# Patient Record
Sex: MALE | Race: WHITE | NOT HISPANIC OR LATINO | URBAN - METROPOLITAN AREA
[De-identification: names, ages, dates, MRNs, and addresses within clinical notes are randomized per-mention and may not be internally consistent; named-entity substitution may affect disease eponyms.]

---

## 2018-01-31 ENCOUNTER — OUTPATIENT (OUTPATIENT)
Dept: OUTPATIENT SERVICES | Facility: HOSPITAL | Age: 48
LOS: 1 days | Discharge: HOME | End: 2018-01-31

## 2018-02-04 DIAGNOSIS — D70.9 NEUTROPENIA, UNSPECIFIED: ICD-10-CM

## 2018-02-04 DIAGNOSIS — F10.20 ALCOHOL DEPENDENCE, UNCOMPLICATED: ICD-10-CM

## 2018-02-04 DIAGNOSIS — F14.10 COCAINE ABUSE, UNCOMPLICATED: ICD-10-CM

## 2018-02-04 DIAGNOSIS — B96.89 OTHER SPECIFIED BACTERIAL AGENTS AS THE CAUSE OF DISEASES CLASSIFIED ELSEWHERE: ICD-10-CM

## 2018-02-12 DIAGNOSIS — K08.409 PARTIAL LOSS OF TEETH, UNSPECIFIED CAUSE, UNSPECIFIED CLASS: ICD-10-CM

## 2018-05-05 ENCOUNTER — INPATIENT (INPATIENT)
Facility: HOSPITAL | Age: 48
LOS: 1 days | Discharge: HOME | End: 2018-05-07
Attending: INTERNAL MEDICINE | Admitting: INTERNAL MEDICINE

## 2018-05-05 VITALS
DIASTOLIC BLOOD PRESSURE: 75 MMHG | HEIGHT: 75 IN | OXYGEN SATURATION: 95 % | RESPIRATION RATE: 20 BRPM | WEIGHT: 220.02 LBS | TEMPERATURE: 98 F | SYSTOLIC BLOOD PRESSURE: 129 MMHG | HEART RATE: 86 BPM

## 2018-05-05 DIAGNOSIS — F14.20 COCAINE DEPENDENCE, UNCOMPLICATED: ICD-10-CM

## 2018-05-05 DIAGNOSIS — R00.0 TACHYCARDIA, UNSPECIFIED: ICD-10-CM

## 2018-05-05 DIAGNOSIS — F19.20 OTHER PSYCHOACTIVE SUBSTANCE DEPENDENCE, UNCOMPLICATED: ICD-10-CM

## 2018-05-05 DIAGNOSIS — F10.20 ALCOHOL DEPENDENCE, UNCOMPLICATED: ICD-10-CM
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
## 2018-05-05 LAB
ALBUMIN SERPL ELPH-MCNC: 4.2 G/DL — SIGNIFICANT CHANGE UP (ref 3.5–5.2)
ALP SERPL-CCNC: 66 U/L — SIGNIFICANT CHANGE UP (ref 30–115)
ALT FLD-CCNC: 64 U/L — HIGH (ref 0–41)
AMMONIA BLD-MCNC: 26 UMOL/L — SIGNIFICANT CHANGE UP (ref 11–55)
AMYLASE P1 CFR SERPL: 13 U/L — LOW (ref 25–115)
ANION GAP SERPL CALC-SCNC: 11 MMOL/L — SIGNIFICANT CHANGE UP (ref 7–14)
APAP SERPL-MCNC: <5 UG/ML — LOW (ref 10–30)
APPEARANCE UR: CLEAR — SIGNIFICANT CHANGE UP
AST SERPL-CCNC: 73 U/L — HIGH (ref 0–41)
BASOPHILS # BLD AUTO: 0.03 K/UL — SIGNIFICANT CHANGE UP (ref 0–0.2)
BASOPHILS NFR BLD AUTO: 0.5 % — SIGNIFICANT CHANGE UP (ref 0–1)
BILIRUB SERPL-MCNC: 0.6 MG/DL — SIGNIFICANT CHANGE UP (ref 0.2–1.2)
BILIRUB UR-MCNC: (no result)
BUN SERPL-MCNC: 25 MG/DL — HIGH (ref 10–20)
CALCIUM SERPL-MCNC: 9.7 MG/DL — SIGNIFICANT CHANGE UP (ref 8.5–10.1)
CHLORIDE SERPL-SCNC: 101 MMOL/L — SIGNIFICANT CHANGE UP (ref 98–110)
CK MB CFR SERPL CALC: 25.1 NG/ML — HIGH (ref 0.6–6.3)
CK SERPL-CCNC: 1464 U/L — HIGH (ref 0–225)
CO2 SERPL-SCNC: 27 MMOL/L — SIGNIFICANT CHANGE UP (ref 17–32)
COLOR SPEC: YELLOW — SIGNIFICANT CHANGE UP
CREAT SERPL-MCNC: 1.1 MG/DL — SIGNIFICANT CHANGE UP (ref 0.7–1.5)
DIFF PNL FLD: (no result)
EOSINOPHIL # BLD AUTO: 0.01 K/UL — SIGNIFICANT CHANGE UP (ref 0–0.7)
EOSINOPHIL NFR BLD AUTO: 0.2 % — SIGNIFICANT CHANGE UP (ref 0–8)
ETHANOL SERPL-MCNC: <10 MG/DL — HIGH
GLUCOSE SERPL-MCNC: 116 MG/DL — HIGH (ref 70–99)
GLUCOSE UR QL: NEGATIVE MG/DL — SIGNIFICANT CHANGE UP
HCT VFR BLD CALC: 44.8 % — SIGNIFICANT CHANGE UP (ref 42–52)
HGB BLD-MCNC: 15.2 G/DL — SIGNIFICANT CHANGE UP (ref 14–18)
IMM GRANULOCYTES NFR BLD AUTO: 0.2 % — SIGNIFICANT CHANGE UP (ref 0.1–0.3)
KETONES UR-MCNC: NEGATIVE — SIGNIFICANT CHANGE UP
LEUKOCYTE ESTERASE UR-ACNC: NEGATIVE — SIGNIFICANT CHANGE UP
LYMPHOCYTES # BLD AUTO: 1.47 K/UL — SIGNIFICANT CHANGE UP (ref 1.2–3.4)
LYMPHOCYTES # BLD AUTO: 24.1 % — SIGNIFICANT CHANGE UP (ref 20.5–51.1)
MAGNESIUM SERPL-MCNC: 2.1 MG/DL — SIGNIFICANT CHANGE UP (ref 1.8–2.4)
MCHC RBC-ENTMCNC: 29 PG — SIGNIFICANT CHANGE UP (ref 27–31)
MCHC RBC-ENTMCNC: 33.9 G/DL — SIGNIFICANT CHANGE UP (ref 32–37)
MCV RBC AUTO: 85.3 FL — SIGNIFICANT CHANGE UP (ref 80–94)
MONOCYTES # BLD AUTO: 0.92 K/UL — HIGH (ref 0.1–0.6)
MONOCYTES NFR BLD AUTO: 15.1 % — HIGH (ref 1.7–9.3)
NEUTROPHILS # BLD AUTO: 3.67 K/UL — SIGNIFICANT CHANGE UP (ref 1.4–6.5)
NEUTROPHILS NFR BLD AUTO: 59.9 % — SIGNIFICANT CHANGE UP (ref 42.2–75.2)
NITRITE UR-MCNC: NEGATIVE — SIGNIFICANT CHANGE UP
PH UR: 5.5 — SIGNIFICANT CHANGE UP (ref 5–8)
PLATELET # BLD AUTO: 207 K/UL — SIGNIFICANT CHANGE UP (ref 130–400)
POTASSIUM SERPL-MCNC: 4.1 MMOL/L — SIGNIFICANT CHANGE UP (ref 3.5–5)
POTASSIUM SERPL-SCNC: 4.1 MMOL/L — SIGNIFICANT CHANGE UP (ref 3.5–5)
PROT SERPL-MCNC: 7.5 G/DL — SIGNIFICANT CHANGE UP (ref 6–8)
PROT UR-MCNC: 100 MG/DL
RBC # BLD: 5.25 M/UL — SIGNIFICANT CHANGE UP (ref 4.7–6.1)
RBC # FLD: 13 % — SIGNIFICANT CHANGE UP (ref 11.5–14.5)
SALICYLATES SERPL-MCNC: <0.3 MG/DL — LOW (ref 4–30)
SODIUM SERPL-SCNC: 139 MMOL/L — SIGNIFICANT CHANGE UP (ref 135–146)
SP GR SPEC: >=1.03 (ref 1.01–1.03)
TROPONIN T SERPL-MCNC: <0.01 NG/ML — SIGNIFICANT CHANGE UP
UROBILINOGEN FLD QL: 0.2 MG/DL — SIGNIFICANT CHANGE UP (ref 0.2–0.2)
WBC # BLD: 6.11 K/UL — SIGNIFICANT CHANGE UP (ref 4.8–10.8)
WBC # FLD AUTO: 6.11 K/UL — SIGNIFICANT CHANGE UP (ref 4.8–10.8)

## 2018-05-05 RX ORDER — THIAMINE MONONITRATE (VIT B1) 100 MG
100 TABLET ORAL DAILY
Qty: 0 | Refills: 0 | Status: DISCONTINUED | OUTPATIENT
Start: 2018-05-05 | End: 2018-05-07

## 2018-05-05 RX ORDER — TUBERCULIN PURIFIED PROTEIN DERIVATIVE 5 [IU]/.1ML
5 INJECTION, SOLUTION INTRADERMAL ONCE
Qty: 0 | Refills: 0 | Status: COMPLETED | OUTPATIENT
Start: 2018-05-05 | End: 2018-05-06

## 2018-05-05 RX ORDER — MAGNESIUM HYDROXIDE 400 MG/1
30 TABLET, CHEWABLE ORAL DAILY
Qty: 0 | Refills: 0 | Status: DISCONTINUED | OUTPATIENT
Start: 2018-05-05 | End: 2018-05-07

## 2018-05-05 RX ORDER — HYDROXYZINE HCL 10 MG
50 TABLET ORAL EVERY 6 HOURS
Qty: 0 | Refills: 0 | Status: DISCONTINUED | OUTPATIENT
Start: 2018-05-05 | End: 2018-05-07

## 2018-05-05 RX ORDER — HYDROXYZINE HCL 10 MG
100 TABLET ORAL AT BEDTIME
Qty: 0 | Refills: 0 | Status: DISCONTINUED | OUTPATIENT
Start: 2018-05-05 | End: 2018-05-07

## 2018-05-05 RX ORDER — ALPRAZOLAM 0.25 MG
1 TABLET ORAL ONCE
Qty: 0 | Refills: 0 | Status: COMPLETED | OUTPATIENT
Start: 2018-05-05 | End: 2018-05-05

## 2018-05-05 RX ORDER — FOLIC ACID 0.8 MG
1 TABLET ORAL DAILY
Qty: 0 | Refills: 0 | Status: DISCONTINUED | OUTPATIENT
Start: 2018-05-05 | End: 2018-05-07

## 2018-05-05 NOTE — ED PROVIDER NOTE - NS ED ROS FT
Eyes:  No visual changes, eye pain or discharge.  ENMT:  No hearing changes, pain, no sore throat or runny nose, no difficulty swallowing  Cardiac:  No chest pain, SOB or edema. No chest pain with exertion.  Respiratory:  No cough or respiratory distress. No hemoptysis. No history of asthma or RAD.  GI:  No nausea, vomiting, diarrhea or abdominal pain.  :  No dysuria, frequency or burning.  MS:  No myalgia, muscle weakness, joint pain or back pain.  Neuro:  No headache or weakness.  No LOC.  Skin:  No skin rash.   Endocrine: No history of thyroid disease or diabetes.

## 2018-05-05 NOTE — ED PROVIDER NOTE - OBJECTIVE STATEMENT
49 yo M here for detox.  Pt recenlty released from alf and states hes gone on a 3 day zacarias of drinking large amounts of vodka and doing cocaine everyday.  Pt anxious in the ER,  tachycardic.  no n/v.  No chest pain

## 2018-05-05 NOTE — H&P ADULT - HISTORY OF PRESENT ILLNESS
· HPI Objective Statement: 47 yo M here for detox.  Pt recenlty released from longterm and states hes gone on a 3 day zacarias of drinking one liter of vodka and doing$100  cocaine everyday.  Pt anxious in the ER,  tachycardic.  no n/v.  No chest pain no h/o ivda, no h/o cindi, tremors, seizures dts in withdrawals	    HIV:    HIV Status:  · Offered: Declined	    PAST MEDICAL/SURGICAL/FAMILY/SOCIAL HISTORY:    Past Medical History:  Alcoholism.     Tobacco Usage:  · Tobacco Usage	Unknown if ever smoked	    ALLERGIES AND HOME MEDICATIONS:   Allergies:        Allergies:  	No Known Allergies:     Home Medications:   * Outpatient Medication Status not yet specified    PHYSICAL EXAM:   · Physical Examination: CONSTITUTIONAL: Well-developed; well-nourished; in no acute distress.   SKIN: warm, dry  HEAD: Normocephalic; atraumatic.  EYES: PERRL, EOMI, no conjunctival erythema  ENT: No nasal discharge; airway clear.  NECK: Supple; non tender.  CARD: S1, S2 normal; no murmurs, gallops, or rubs. Regular rate and rhythm.   RESP: No wheezes, rales or rhonchi.  ABD: soft ntnd  EXT: Normal ROM.  No clubbing, cyanosis or edema.   LYMPH: No acute cervical adenopathy.  NEURO: Alert, oriented, grossly unremarkable PSYCH: Cooperative, appropriate.

## 2018-05-05 NOTE — H&P ADULT - NSHPREVIEWOFSYSTEMS_GEN_ALL_CORE
REVIEW OF SYSTEMS:    Review of Systems:  · Review of Systems: Eyes:  No visual changes, eye pain or discharge.  	ENMT:  No hearing changes, pain, no sore throat or runny nose, no difficulty swallowing  	Cardiac:  No chest pain, SOB or edema. No chest pain with exertion.  	Respiratory:  No cough or respiratory distress. No hemoptysis. No history of asthma or RAD.  	GI:  No nausea, vomiting, diarrhea or abdominal pain.  	:  No dysuria, frequency or burning.  	MS:  No myalgia, muscle weakness, joint pain or back pain.  	Neuro:  No headache or weakness.  No LOC.  	Skin:  No skin rash.   Endocrine: No history of thyroid disease or diabetes.

## 2018-05-05 NOTE — H&P ADULT - NSHPLABSRESULTS_GEN_ALL_CORE
15.2   6.11  )-----------( 207      ( 05 May 2018 19:05 )             44.8   05-05    139  |  101  |  25<H>  ----------------------------<  116<H>  4.1   |  27  |  1.1    Ca    9.7      05 May 2018 19:05  Mg     2.1     05-05    TPro  7.5  /  Alb  4.2  /  TBili  0.6  /  DBili  x   /  AST  73<H>  /  ALT  64<H>  /  AlkPhos  66  05-05

## 2018-05-06 LAB
HAV IGG SER QL IA: SIGNIFICANT CHANGE UP
HAV IGM SER-ACNC: SIGNIFICANT CHANGE UP
HBV CORE AB SER-ACNC: SIGNIFICANT CHANGE UP
HBV CORE IGM SER-ACNC: SIGNIFICANT CHANGE UP
HBV SURFACE AB SER-ACNC: SIGNIFICANT CHANGE UP
HBV SURFACE AG SER-ACNC: SIGNIFICANT CHANGE UP
HCV AB S/CO SERPL IA: 0.2 S/CO — SIGNIFICANT CHANGE UP
HCV AB SERPL-IMP: SIGNIFICANT CHANGE UP
T PALLIDUM AB TITR SER: NEGATIVE — SIGNIFICANT CHANGE UP

## 2018-05-06 RX ADMIN — Medication 1 TABLET(S): at 09:13

## 2018-05-06 RX ADMIN — Medication 50 MILLIGRAM(S): at 09:15

## 2018-05-06 RX ADMIN — TUBERCULIN PURIFIED PROTEIN DERIVATIVE 5 UNIT(S): 5 INJECTION, SOLUTION INTRADERMAL at 22:26

## 2018-05-06 RX ADMIN — Medication 1 MILLIGRAM(S): at 09:13

## 2018-05-06 RX ADMIN — Medication 50 MILLIGRAM(S): at 09:12

## 2018-05-06 RX ADMIN — Medication 100 MILLIGRAM(S): at 09:12

## 2018-05-07 VITALS
TEMPERATURE: 98 F | SYSTOLIC BLOOD PRESSURE: 101 MMHG | HEART RATE: 71 BPM | DIASTOLIC BLOOD PRESSURE: 56 MMHG | RESPIRATION RATE: 16 BRPM

## 2018-05-07 LAB — DRUG SCREEN 1, URINE RESULT: SIGNIFICANT CHANGE UP

## 2018-05-07 RX ADMIN — Medication 1 MILLIGRAM(S): at 09:19

## 2018-05-07 RX ADMIN — Medication 1 TABLET(S): at 09:19

## 2018-05-07 RX ADMIN — Medication 100 MILLIGRAM(S): at 09:19

## 2018-05-07 NOTE — CHART NOTE - NSCHARTNOTEFT_GEN_A_CORE
Subsequent Inpatient Encounter                                       Detox Unit    ADRIANNA GARZA   48y   Male      Chief Complaint:    Follow up for Alcohol  Dependency    HPI:     I reviewed previous notes. No Change, except if noted below.             Detail:_    ROS:   I reviewed with patient.  No changes from previous notes except if noted below.             Detail: _    PFSH I reviewed with patient. No changes from previous notes except if noted below.             Detail_    Medication reconciliation performed.    MEDICATIONS  (STANDING):  folic acid 1 milliGRAM(s) Oral daily  multivitamin 1 Tablet(s) Oral daily  thiamine 100 milliGRAM(s) Oral daily      MEDICATIONS  (PRN):  aluminum hydroxide/magnesium hydroxide/simethicone Suspension 30 milliLiter(s) Oral every 4 hours PRN Dyspepsia  chlordiazePOXIDE 50 milliGRAM(s) Oral every 1 hour PRN Alcohol Withdrawal Symptoms  chlordiazePOXIDE 25 milliGRAM(s) Oral every 2 hours PRN Alcohol Withdrawal Symptoms  cloNIDine 0.1 milliGRAM(s) Oral every 8 hours PRN sbp>140  hydrOXYzine hydrochloride 50 milliGRAM(s) Oral every 6 hours PRN Anxiety  hydrOXYzine hydrochloride 100 milliGRAM(s) Oral at bedtime PRN insomnia  magnesium hydroxide Suspension 30 milliLiter(s) Oral daily PRN Constipation      T(C): 36.4 (05-07-18 @ 06:00), Max: 37.1 (05-06-18 @ 20:04)  HR: 71 (05-07-18 @ 06:00) (69 - 112)  BP: 101/56 (05-07-18 @ 06:00) (101/56 - 120/62)  RR: 16 (05-07-18 @ 06:00) (16 - 16)  SpO2: --    PHYSICAL EXAM:      Constitutional: NAD, A&O x3    Eyes: PERRLA, no conjuctivitis    Neck: no lymphadenopathy    Respiratory: +air entry, no rales, no rhonchi, no wheezes    Cardiovascular: +S1 and S2, regular rate and rhythm    Gastrointestinal: +BS, soft, non-tender, not distended    Extremities:  no edema, no calf tenderness    Skin: no rashes, normal turgor                            15.2   6.11  )-----------( 207      ( 05 May 2018 19:05 )             44.8   05-05    139  |  101  |  25<H>  ----------------------------<  116<H>  4.1   |  27  |  1.1    Ca    9.7      05 May 2018 19:05  Mg     2.1     05-05    TPro  7.5  /  Alb  4.2  /  TBili  0.6  /  DBili  x   /  AST  73<H>  /  ALT  64<H>  /  AlkPhos  66  05-05    Magnesium, Serum: 2.1 mg/dL (05-05-18 @ 19:05)  Ammonia, Serum: 26 umol/L (05-05-18 @ 19:05)  Amylase, Serum Total: 13 U/L (05-05-18 @ 19:05)  Treponema Pallidum Antibody Interpretation: Negative (05-05-18 @ 19:05)  Hepatitis B Core Antibody, Total: Nonreact (05-05-18 @ 19:05)  Hepatitis B Surface Antibody: Nonreact (05-05-18 @ 19:05)  Hepatitis B Surface Antigen: Nonreact (05-05-18 @ 19:05)  Hepatitis C Virus S/CO Ratio: 0.20 S/CO (05-05-18 @ 19:05)    Hepatitis C Virus Interpretation: Nonreact (05-05-18 @ 19:05)      Urinalysis Basic - ( 05 May 2018 21:32 )    Color: Yellow / Appearance: Clear / SG: >=1.030 / pH: x  Gluc: x / Ketone: Negative  / Bili: Small / Urobili: 0.2 mg/dL   Blood: x / Protein: 100 mg/dL / Nitrite: Negative   Leuk Esterase: Negative / RBC: 1-2 /HPF / WBC 1-2 /HPF   Sq Epi: x / Non Sq Epi: Occasional /HPF / Bacteria: x          Impression and Plan:    Primary Diagnosis:  Alcohol Dependency                                Medication: Librium Protocol/ CIWA Protocol    Secondary Diagnosis:                                                                  Medication:    Tertiary Diagnosis:                                                                       Medication      Continue Detox Protocols. Use of PRNS as needed for withdrawal and comfort.    Adjustments to protocols:    Labs/ Tests reviewed.    Tests ordered:     Likely Disposition: __X_Home       ___Rehab       ___Outpatient Program    ___Self Help     _____Other    Estimated Length of stay:__3__

## 2018-05-15 ENCOUNTER — EMERGENCY (EMERGENCY)
Facility: HOSPITAL | Age: 48
LOS: 0 days | Discharge: HOME | End: 2018-05-15
Attending: EMERGENCY MEDICINE | Admitting: EMERGENCY MEDICINE

## 2018-05-15 VITALS
DIASTOLIC BLOOD PRESSURE: 61 MMHG | RESPIRATION RATE: 18 BRPM | SYSTOLIC BLOOD PRESSURE: 110 MMHG | TEMPERATURE: 99 F | HEART RATE: 78 BPM | OXYGEN SATURATION: 97 %

## 2018-05-15 DIAGNOSIS — J02.9 ACUTE PHARYNGITIS, UNSPECIFIED: ICD-10-CM

## 2018-05-15 DIAGNOSIS — R59.0 LOCALIZED ENLARGED LYMPH NODES: ICD-10-CM

## 2018-05-15 DIAGNOSIS — Z23 ENCOUNTER FOR IMMUNIZATION: ICD-10-CM

## 2018-05-15 DIAGNOSIS — Z87.891 PERSONAL HISTORY OF NICOTINE DEPENDENCE: ICD-10-CM

## 2018-05-15 DIAGNOSIS — J34.89 OTHER SPECIFIED DISORDERS OF NOSE AND NASAL SINUSES: ICD-10-CM

## 2018-05-15 DIAGNOSIS — R50.9 FEVER, UNSPECIFIED: ICD-10-CM

## 2018-05-15 DIAGNOSIS — Z79.899 OTHER LONG TERM (CURRENT) DRUG THERAPY: ICD-10-CM

## 2018-05-15 DIAGNOSIS — L03.114 CELLULITIS OF LEFT UPPER LIMB: ICD-10-CM

## 2018-05-15 LAB
ALBUMIN SERPL ELPH-MCNC: 3.2 G/DL — LOW (ref 3.5–5.2)
ALP SERPL-CCNC: 73 U/L — SIGNIFICANT CHANGE UP (ref 30–115)
ALT FLD-CCNC: 32 U/L — SIGNIFICANT CHANGE UP (ref 0–41)
ANION GAP SERPL CALC-SCNC: 13 MMOL/L — SIGNIFICANT CHANGE UP (ref 7–14)
AST SERPL-CCNC: 19 U/L — SIGNIFICANT CHANGE UP (ref 0–41)
BASOPHILS # BLD AUTO: 0.03 K/UL — SIGNIFICANT CHANGE UP (ref 0–0.2)
BASOPHILS NFR BLD AUTO: 0.7 % — SIGNIFICANT CHANGE UP (ref 0–1)
BILIRUB SERPL-MCNC: 0.4 MG/DL — SIGNIFICANT CHANGE UP (ref 0.2–1.2)
BUN SERPL-MCNC: 18 MG/DL — SIGNIFICANT CHANGE UP (ref 10–20)
CALCIUM SERPL-MCNC: 8.6 MG/DL — SIGNIFICANT CHANGE UP (ref 8.5–10.1)
CHLORIDE SERPL-SCNC: 103 MMOL/L — SIGNIFICANT CHANGE UP (ref 98–110)
CO2 SERPL-SCNC: 24 MMOL/L — SIGNIFICANT CHANGE UP (ref 17–32)
CREAT SERPL-MCNC: 0.9 MG/DL — SIGNIFICANT CHANGE UP (ref 0.7–1.5)
EOSINOPHIL # BLD AUTO: 0.12 K/UL — SIGNIFICANT CHANGE UP (ref 0–0.7)
EOSINOPHIL NFR BLD AUTO: 2.9 % — SIGNIFICANT CHANGE UP (ref 0–8)
GLUCOSE SERPL-MCNC: 149 MG/DL — HIGH (ref 70–99)
HCT VFR BLD CALC: 36.4 % — LOW (ref 42–52)
HGB BLD-MCNC: 12.1 G/DL — LOW (ref 14–18)
IMM GRANULOCYTES NFR BLD AUTO: 9.6 % — HIGH (ref 0.1–0.3)
LYMPHOCYTES # BLD AUTO: 0.85 K/UL — LOW (ref 1.2–3.4)
LYMPHOCYTES # BLD AUTO: 20.5 % — SIGNIFICANT CHANGE UP (ref 20.5–51.1)
MCHC RBC-ENTMCNC: 27.6 PG — SIGNIFICANT CHANGE UP (ref 27–31)
MCHC RBC-ENTMCNC: 33.2 G/DL — SIGNIFICANT CHANGE UP (ref 32–37)
MCV RBC AUTO: 83.1 FL — SIGNIFICANT CHANGE UP (ref 80–94)
MONOCYTES # BLD AUTO: 1.25 K/UL — HIGH (ref 0.1–0.6)
MONOCYTES NFR BLD AUTO: 30.1 % — HIGH (ref 1.7–9.3)
NEUTROPHILS # BLD AUTO: 1.5 K/UL — SIGNIFICANT CHANGE UP (ref 1.4–6.5)
NEUTROPHILS NFR BLD AUTO: 36.2 % — LOW (ref 42.2–75.2)
NRBC # BLD: 0 /100 WBCS — SIGNIFICANT CHANGE UP (ref 0–0)
PLATELET # BLD AUTO: 248 K/UL — SIGNIFICANT CHANGE UP (ref 130–400)
POTASSIUM SERPL-MCNC: 3.5 MMOL/L — SIGNIFICANT CHANGE UP (ref 3.5–5)
POTASSIUM SERPL-SCNC: 3.5 MMOL/L — SIGNIFICANT CHANGE UP (ref 3.5–5)
PROT SERPL-MCNC: 5.8 G/DL — LOW (ref 6–8)
RBC # BLD: 4.38 M/UL — LOW (ref 4.7–6.1)
RBC # FLD: 12.9 % — SIGNIFICANT CHANGE UP (ref 11.5–14.5)
SODIUM SERPL-SCNC: 140 MMOL/L — SIGNIFICANT CHANGE UP (ref 135–146)
WBC # BLD: 4.15 K/UL — LOW (ref 4.8–10.8)
WBC # FLD AUTO: 4.15 K/UL — LOW (ref 4.8–10.8)

## 2018-05-15 RX ORDER — DEXAMETHASONE 0.5 MG/5ML
12 ELIXIR ORAL ONCE
Qty: 0 | Refills: 0 | Status: DISCONTINUED | OUTPATIENT
Start: 2018-05-15 | End: 2018-05-15

## 2018-05-15 RX ORDER — TETANUS TOXOID, REDUCED DIPHTHERIA TOXOID AND ACELLULAR PERTUSSIS VACCINE, ADSORBED 5; 2.5; 8; 8; 2.5 [IU]/.5ML; [IU]/.5ML; UG/.5ML; UG/.5ML; UG/.5ML
0.5 SUSPENSION INTRAMUSCULAR ONCE
Qty: 0 | Refills: 0 | Status: COMPLETED | OUTPATIENT
Start: 2018-05-15 | End: 2018-05-15

## 2018-05-15 RX ADMIN — TETANUS TOXOID, REDUCED DIPHTHERIA TOXOID AND ACELLULAR PERTUSSIS VACCINE, ADSORBED 0.5 MILLILITER(S): 5; 2.5; 8; 8; 2.5 SUSPENSION INTRAMUSCULAR at 06:39

## 2018-05-15 NOTE — ED PROVIDER NOTE - PHYSICAL EXAMINATION
VITAL SIGNS: I have reviewed nursing notes and confirm.  CONSTITUTIONAL: Well-developed; well-nourished; in no acute distress.  SKIN: Skin exam is warm and dry, old appearing lac to L hand over snuffbox, mild erythema surround, no warmth, no fluctuance.  HEAD: Normocephalic; atraumatic.  EYES: PERRL, EOM intact; conjunctiva and sclera clear.  ENT: clear rhinorrhea, midline uvula, mildly eyrthematous posterior oropharynx, no exudate, no edema, airway clear.  NECK: Supple; non tender. see lymph  CARD:. Regular rate and rhythm.  RESP: No wheezes, rales or rhonchi.  ABD: Normal bowel sounds; soft; non-distended; non-tender; no hepatosplenomegaly.  EXT: Normal ROM. No clubbing, cyanosis or edema.  LYMPH: anterior lymphadenopathy, R >L, no matting, mildly tender  NEURO: Alert, oriented. Grossly unremarkable. No focal deficits.  PSYCH: Cooperative, appropriate.

## 2018-05-15 NOTE — ED PROVIDER NOTE - NS ED ROS FT
Constitutional: see HPI  ENT: see HPI  Cardiac: No chest pain, SOB or edema.  Respiratory: No cough or respiratory distress  GI: No nausea, vomiting, diarrhea or abdominal pain.  : No dysuria, frequency, urgency or hematuria  MS: no pain to back or extremities, no loss of ROM, no weakness  Neuro: No headache or weakness. No LOC.  Skin: see HPI  Except as documented in the HPI, all other systems are negative.

## 2018-05-15 NOTE — ED PROVIDER NOTE - OBJECTIVE STATEMENT
49 yo M with no chronic medical problems but reportedly previous low WBC requiring a "shot" possibly neupogen, here for subjective fever, odynophagia, rhinorrhea and also increasing erythema to a superficial cut to L hand that patient reports sustaining at work.     He denies recent drugs, alcohol however on chart review was in detox 1 week ago for alcohol and cocaine. Per chart review was also recently incarcerated.

## 2018-05-15 NOTE — ED PROVIDER NOTE - PROGRESS NOTE DETAILS
labs are unremarkable -- will treat mild cellulitis with doxy, update tdap. Will give dex for mild pharyngitis. No signs of PTA or RPA, no neutropenia to warrant admission, IV abx.

## 2018-05-15 NOTE — ED PROVIDER NOTE - MEDICAL DECISION MAKING DETAILS
Patient with likely URI --he reports extenisve eval of previous "low WBC" but no findings, will check CBC, CMP, likely treat mild hand cellulitis with doxy. Currently afebrile and non toxic, no indication for admission at this time.

## 2018-09-01 ENCOUNTER — INPATIENT (INPATIENT)
Facility: HOSPITAL | Age: 48
LOS: 4 days | Discharge: HOME | End: 2018-09-06
Attending: INTERNAL MEDICINE | Admitting: INTERNAL MEDICINE

## 2018-09-01 VITALS
HEART RATE: 105 BPM | SYSTOLIC BLOOD PRESSURE: 117 MMHG | DIASTOLIC BLOOD PRESSURE: 73 MMHG | RESPIRATION RATE: 18 BRPM | TEMPERATURE: 101 F | OXYGEN SATURATION: 97 %

## 2018-09-01 VITALS
HEIGHT: 74 IN | RESPIRATION RATE: 18 BRPM | DIASTOLIC BLOOD PRESSURE: 76 MMHG | SYSTOLIC BLOOD PRESSURE: 108 MMHG | WEIGHT: 214.95 LBS | TEMPERATURE: 97 F | OXYGEN SATURATION: 97 % | HEART RATE: 90 BPM

## 2018-09-01 VITALS — TEMPERATURE: 99 F

## 2018-09-01 DIAGNOSIS — B37.0 CANDIDAL STOMATITIS: ICD-10-CM

## 2018-09-01 DIAGNOSIS — D70.2 OTHER DRUG-INDUCED AGRANULOCYTOSIS: ICD-10-CM

## 2018-09-01 DIAGNOSIS — R50.81 FEVER PRESENTING WITH CONDITIONS CLASSIFIED ELSEWHERE: ICD-10-CM

## 2018-09-01 DIAGNOSIS — L03.818 CELLULITIS OF OTHER SITES: ICD-10-CM

## 2018-09-01 DIAGNOSIS — Z02.9 ENCOUNTER FOR ADMINISTRATIVE EXAMINATIONS, UNSPECIFIED: ICD-10-CM

## 2018-09-01 DIAGNOSIS — R50.9 FEVER, UNSPECIFIED: ICD-10-CM

## 2018-09-01 DIAGNOSIS — F14.288: ICD-10-CM

## 2018-09-01 DIAGNOSIS — D64.9 ANEMIA, UNSPECIFIED: ICD-10-CM

## 2018-09-01 LAB
ALBUMIN SERPL ELPH-MCNC: 3.1 G/DL — LOW (ref 3.5–5.2)
ALBUMIN SERPL ELPH-MCNC: 3.3 G/DL — LOW (ref 3.5–5.2)
ALBUMIN SERPL ELPH-MCNC: 3.8 G/DL — SIGNIFICANT CHANGE UP (ref 3.5–5.2)
ALP SERPL-CCNC: 57 U/L — SIGNIFICANT CHANGE UP (ref 30–115)
ALP SERPL-CCNC: 61 U/L — SIGNIFICANT CHANGE UP (ref 30–115)
ALP SERPL-CCNC: 69 U/L — SIGNIFICANT CHANGE UP (ref 30–115)
ALT FLD-CCNC: 23 U/L — SIGNIFICANT CHANGE UP (ref 0–41)
ALT FLD-CCNC: 24 U/L — SIGNIFICANT CHANGE UP (ref 0–41)
ALT FLD-CCNC: 30 U/L — SIGNIFICANT CHANGE UP (ref 0–41)
ANION GAP SERPL CALC-SCNC: 11 MMOL/L — SIGNIFICANT CHANGE UP (ref 7–14)
ANION GAP SERPL CALC-SCNC: 12 MMOL/L — SIGNIFICANT CHANGE UP (ref 7–14)
ANION GAP SERPL CALC-SCNC: 13 MMOL/L — SIGNIFICANT CHANGE UP (ref 7–14)
APTT BLD: 39.7 SEC — HIGH (ref 27–39.2)
AST SERPL-CCNC: 19 U/L — SIGNIFICANT CHANGE UP (ref 0–41)
AST SERPL-CCNC: 19 U/L — SIGNIFICANT CHANGE UP (ref 0–41)
AST SERPL-CCNC: 29 U/L — SIGNIFICANT CHANGE UP (ref 0–41)
BASE EXCESS BLDV CALC-SCNC: 1.5 MMOL/L — SIGNIFICANT CHANGE UP (ref -2–2)
BASE EXCESS BLDV CALC-SCNC: 2.2 MMOL/L — HIGH (ref -2–2)
BASOPHILS # BLD AUTO: 0.01 K/UL — SIGNIFICANT CHANGE UP (ref 0–0.2)
BASOPHILS # BLD AUTO: 0.02 K/UL — SIGNIFICANT CHANGE UP (ref 0–0.2)
BASOPHILS # BLD AUTO: 0.03 K/UL — SIGNIFICANT CHANGE UP (ref 0–0.2)
BASOPHILS NFR BLD AUTO: 2 % — HIGH (ref 0–1)
BASOPHILS NFR BLD AUTO: 2.4 % — HIGH (ref 0–1)
BASOPHILS NFR BLD AUTO: 4 % — HIGH (ref 0–1)
BILIRUB SERPL-MCNC: 0.6 MG/DL — SIGNIFICANT CHANGE UP (ref 0.2–1.2)
BILIRUB SERPL-MCNC: 0.7 MG/DL — SIGNIFICANT CHANGE UP (ref 0.2–1.2)
BILIRUB SERPL-MCNC: 0.7 MG/DL — SIGNIFICANT CHANGE UP (ref 0.2–1.2)
BUN SERPL-MCNC: 10 MG/DL — SIGNIFICANT CHANGE UP (ref 10–20)
BUN SERPL-MCNC: 11 MG/DL — SIGNIFICANT CHANGE UP (ref 10–20)
BUN SERPL-MCNC: 12 MG/DL — SIGNIFICANT CHANGE UP (ref 10–20)
CA-I SERPL-SCNC: 1.14 MMOL/L — SIGNIFICANT CHANGE UP (ref 1.12–1.3)
CA-I SERPL-SCNC: 1.15 MMOL/L — SIGNIFICANT CHANGE UP (ref 1.12–1.3)
CALCIUM SERPL-MCNC: 7.7 MG/DL — LOW (ref 8.5–10.1)
CALCIUM SERPL-MCNC: 8.1 MG/DL — LOW (ref 8.5–10.1)
CALCIUM SERPL-MCNC: 8.6 MG/DL — SIGNIFICANT CHANGE UP (ref 8.5–10.1)
CHLORIDE SERPL-SCNC: 101 MMOL/L — SIGNIFICANT CHANGE UP (ref 98–110)
CHLORIDE SERPL-SCNC: 101 MMOL/L — SIGNIFICANT CHANGE UP (ref 98–110)
CHLORIDE SERPL-SCNC: 98 MMOL/L — SIGNIFICANT CHANGE UP (ref 98–110)
CO2 SERPL-SCNC: 22 MMOL/L — SIGNIFICANT CHANGE UP (ref 17–32)
CO2 SERPL-SCNC: 24 MMOL/L — SIGNIFICANT CHANGE UP (ref 17–32)
CO2 SERPL-SCNC: 24 MMOL/L — SIGNIFICANT CHANGE UP (ref 17–32)
CREAT SERPL-MCNC: 0.9 MG/DL — SIGNIFICANT CHANGE UP (ref 0.7–1.5)
CREAT SERPL-MCNC: 0.9 MG/DL — SIGNIFICANT CHANGE UP (ref 0.7–1.5)
CREAT SERPL-MCNC: 1.1 MG/DL — SIGNIFICANT CHANGE UP (ref 0.7–1.5)
EOSINOPHIL # BLD AUTO: 0.02 K/UL — SIGNIFICANT CHANGE UP (ref 0–0.7)
EOSINOPHIL # BLD AUTO: 0.04 K/UL — SIGNIFICANT CHANGE UP (ref 0–0.7)
EOSINOPHIL # BLD AUTO: 0.07 K/UL — SIGNIFICANT CHANGE UP (ref 0–0.7)
EOSINOPHIL NFR BLD AUTO: 2.4 % — SIGNIFICANT CHANGE UP (ref 0–8)
EOSINOPHIL NFR BLD AUTO: 6 % — SIGNIFICANT CHANGE UP (ref 0–8)
EOSINOPHIL NFR BLD AUTO: 9 % — HIGH (ref 0–8)
GAS PNL BLDV: 136 MMOL/L — SIGNIFICANT CHANGE UP (ref 136–145)
GAS PNL BLDV: 137 MMOL/L — SIGNIFICANT CHANGE UP (ref 136–145)
GAS PNL BLDV: SIGNIFICANT CHANGE UP
GAS PNL BLDV: SIGNIFICANT CHANGE UP
GLUCOSE SERPL-MCNC: 136 MG/DL — HIGH (ref 70–99)
GLUCOSE SERPL-MCNC: 141 MG/DL — HIGH (ref 70–99)
GLUCOSE SERPL-MCNC: 166 MG/DL — HIGH (ref 70–99)
HCO3 BLDV-SCNC: 26 MMOL/L — SIGNIFICANT CHANGE UP (ref 22–29)
HCO3 BLDV-SCNC: 28 MMOL/L — SIGNIFICANT CHANGE UP (ref 22–29)
HCT VFR BLD CALC: 32.2 % — LOW (ref 42–52)
HCT VFR BLD CALC: 33.4 % — LOW (ref 42–52)
HCT VFR BLD CALC: 36 % — LOW (ref 42–52)
HCT VFR BLDA CALC: 33.1 % — LOW (ref 34–44)
HCT VFR BLDA CALC: 46.1 % — HIGH (ref 34–44)
HGB BLD CALC-MCNC: 10.8 G/DL — LOW (ref 14–18)
HGB BLD CALC-MCNC: 15 G/DL — SIGNIFICANT CHANGE UP (ref 14–18)
HGB BLD-MCNC: 10.7 G/DL — LOW (ref 14–18)
HGB BLD-MCNC: 11 G/DL — LOW (ref 14–18)
HGB BLD-MCNC: 11.8 G/DL — LOW (ref 14–18)
HOROWITZ INDEX BLDV+IHG-RTO: 21 — SIGNIFICANT CHANGE UP
IMM GRANULOCYTES NFR BLD AUTO: 0 % — LOW (ref 0.1–0.3)
INR BLD: 1.64 RATIO — HIGH (ref 0.65–1.3)
LACTATE BLDV-MCNC: 0.5 MMOL/L — SIGNIFICANT CHANGE UP (ref 0.5–1.6)
LACTATE BLDV-MCNC: 0.9 MMOL/L — SIGNIFICANT CHANGE UP (ref 0.5–1.6)
LACTATE SERPL-SCNC: 0.6 MMOL/L — SIGNIFICANT CHANGE UP (ref 0.5–2.2)
LACTATE SERPL-SCNC: 0.8 MMOL/L — SIGNIFICANT CHANGE UP (ref 0.5–2.2)
LYMPHOCYTES # BLD AUTO: 0.37 K/UL — LOW (ref 1.2–3.4)
LYMPHOCYTES # BLD AUTO: 0.49 K/UL — LOW (ref 1.2–3.4)
LYMPHOCYTES # BLD AUTO: 0.59 K/UL — LOW (ref 1.2–3.4)
LYMPHOCYTES # BLD AUTO: 54 % — HIGH (ref 20.5–51.1)
LYMPHOCYTES # BLD AUTO: 61 % — HIGH (ref 20.5–51.1)
LYMPHOCYTES # BLD AUTO: 72 % — HIGH (ref 20.5–51.1)
MACROCYTES BLD QL: SLIGHT — SIGNIFICANT CHANGE UP
MAGNESIUM SERPL-MCNC: 1.8 MG/DL — SIGNIFICANT CHANGE UP (ref 1.8–2.4)
MANUAL SMEAR VERIFICATION: SIGNIFICANT CHANGE UP
MANUAL SMEAR VERIFICATION: SIGNIFICANT CHANGE UP
MCHC RBC-ENTMCNC: 26.8 PG — LOW (ref 27–31)
MCHC RBC-ENTMCNC: 27 PG — SIGNIFICANT CHANGE UP (ref 27–31)
MCHC RBC-ENTMCNC: 27.1 PG — SIGNIFICANT CHANGE UP (ref 27–31)
MCHC RBC-ENTMCNC: 32.8 G/DL — SIGNIFICANT CHANGE UP (ref 32–37)
MCHC RBC-ENTMCNC: 32.9 G/DL — SIGNIFICANT CHANGE UP (ref 32–37)
MCHC RBC-ENTMCNC: 33.2 G/DL — SIGNIFICANT CHANGE UP (ref 32–37)
MCV RBC AUTO: 81.3 FL — SIGNIFICANT CHANGE UP (ref 80–94)
MCV RBC AUTO: 81.5 FL — SIGNIFICANT CHANGE UP (ref 80–94)
MCV RBC AUTO: 82.6 FL — SIGNIFICANT CHANGE UP (ref 80–94)
MONOCYTES # BLD AUTO: 0.19 K/UL — SIGNIFICANT CHANGE UP (ref 0.1–0.6)
MONOCYTES # BLD AUTO: 0.19 K/UL — SIGNIFICANT CHANGE UP (ref 0.1–0.6)
MONOCYTES # BLD AUTO: 0.26 K/UL — SIGNIFICANT CHANGE UP (ref 0.1–0.6)
MONOCYTES NFR BLD AUTO: 23.2 % — HIGH (ref 1.7–9.3)
MONOCYTES NFR BLD AUTO: 24 % — HIGH (ref 1.7–9.3)
MONOCYTES NFR BLD AUTO: 38 % — HIGH (ref 1.7–9.3)
NEUTROPHILS # BLD AUTO: 0 K/UL — LOW (ref 1.4–6.5)
NEUTROPHILS NFR BLD AUTO: 0 % — LOW (ref 42.2–75.2)
NEUTS BAND # BLD: 2 % — SIGNIFICANT CHANGE UP (ref 0–6)
NRBC # BLD: 0 /100 WBCS — SIGNIFICANT CHANGE UP (ref 0–0)
NRBC # BLD: 0 /100 — SIGNIFICANT CHANGE UP (ref 0–0)
NRBC # BLD: SIGNIFICANT CHANGE UP /100 WBCS (ref 0–0)
NRBC # BLD: SIGNIFICANT CHANGE UP /100 WBCS (ref 0–0)
PCO2 BLDV: 39 MMHG — LOW (ref 41–51)
PCO2 BLDV: 47 MMHG — SIGNIFICANT CHANGE UP (ref 41–51)
PH BLDV: 7.39 — SIGNIFICANT CHANGE UP (ref 7.26–7.43)
PH BLDV: 7.43 — SIGNIFICANT CHANGE UP (ref 7.26–7.43)
PLAT MORPH BLD: NORMAL — SIGNIFICANT CHANGE UP
PLATELET # BLD AUTO: 208 K/UL — SIGNIFICANT CHANGE UP (ref 130–400)
PLATELET # BLD AUTO: 235 K/UL — SIGNIFICANT CHANGE UP (ref 130–400)
PLATELET # BLD AUTO: 268 K/UL — SIGNIFICANT CHANGE UP (ref 130–400)
PO2 BLDV: 22 MMHG — SIGNIFICANT CHANGE UP (ref 20–40)
PO2 BLDV: 57 MMHG — HIGH (ref 20–40)
POTASSIUM BLDV-SCNC: 3.4 MMOL/L — SIGNIFICANT CHANGE UP (ref 3.3–5.6)
POTASSIUM BLDV-SCNC: 3.6 MMOL/L — SIGNIFICANT CHANGE UP (ref 3.3–5.6)
POTASSIUM SERPL-MCNC: 3.6 MMOL/L — SIGNIFICANT CHANGE UP (ref 3.5–5)
POTASSIUM SERPL-MCNC: 4 MMOL/L — SIGNIFICANT CHANGE UP (ref 3.5–5)
POTASSIUM SERPL-MCNC: 4 MMOL/L — SIGNIFICANT CHANGE UP (ref 3.5–5)
POTASSIUM SERPL-SCNC: 3.6 MMOL/L — SIGNIFICANT CHANGE UP (ref 3.5–5)
POTASSIUM SERPL-SCNC: 4 MMOL/L — SIGNIFICANT CHANGE UP (ref 3.5–5)
POTASSIUM SERPL-SCNC: 4 MMOL/L — SIGNIFICANT CHANGE UP (ref 3.5–5)
PROT SERPL-MCNC: 5.8 G/DL — LOW (ref 6–8)
PROT SERPL-MCNC: 6.1 G/DL — SIGNIFICANT CHANGE UP (ref 6–8)
PROT SERPL-MCNC: 7 G/DL — SIGNIFICANT CHANGE UP (ref 6–8)
PROTHROM AB SERPL-ACNC: 17.9 SEC — HIGH (ref 9.95–12.87)
RBC # BLD: 3.96 M/UL — LOW (ref 4.7–6.1)
RBC # BLD: 4.1 M/UL — LOW (ref 4.7–6.1)
RBC # BLD: 4.36 M/UL — LOW (ref 4.7–6.1)
RBC # FLD: 13.6 % — SIGNIFICANT CHANGE UP (ref 11.5–14.5)
RBC BLD AUTO: NORMAL — SIGNIFICANT CHANGE UP
SAO2 % BLDV: 34 % — SIGNIFICANT CHANGE UP
SAO2 % BLDV: 91 % — SIGNIFICANT CHANGE UP
SODIUM SERPL-SCNC: 134 MMOL/L — LOW (ref 135–146)
SODIUM SERPL-SCNC: 135 MMOL/L — SIGNIFICANT CHANGE UP (ref 135–146)
SODIUM SERPL-SCNC: 137 MMOL/L — SIGNIFICANT CHANGE UP (ref 135–146)
VARIANT LYMPHS # BLD: 2 % — SIGNIFICANT CHANGE UP (ref 0–5)
VARIANT LYMPHS # BLD: 8 % — HIGH (ref 0–5)
WBC # BLD: 0.68 K/UL — CRITICAL LOW (ref 4.8–10.8)
WBC # BLD: 0.8 K/UL — CRITICAL LOW (ref 4.8–10.8)
WBC # BLD: 0.82 K/UL — CRITICAL LOW (ref 4.8–10.8)
WBC # FLD AUTO: 0.68 K/UL — CRITICAL LOW (ref 4.8–10.8)
WBC # FLD AUTO: 0.8 K/UL — CRITICAL LOW (ref 4.8–10.8)
WBC # FLD AUTO: 0.82 K/UL — CRITICAL LOW (ref 4.8–10.8)

## 2018-09-01 RX ORDER — SODIUM CHLORIDE 9 MG/ML
1000 INJECTION INTRAMUSCULAR; INTRAVENOUS; SUBCUTANEOUS ONCE
Qty: 0 | Refills: 0 | Status: COMPLETED | OUTPATIENT
Start: 2018-09-01 | End: 2018-09-01

## 2018-09-01 RX ORDER — CEFEPIME 1 G/1
2000 INJECTION, POWDER, FOR SOLUTION INTRAMUSCULAR; INTRAVENOUS ONCE
Qty: 0 | Refills: 0 | Status: COMPLETED | OUTPATIENT
Start: 2018-09-01 | End: 2018-09-01

## 2018-09-01 RX ORDER — KETOROLAC TROMETHAMINE 30 MG/ML
15 SYRINGE (ML) INJECTION ONCE
Qty: 0 | Refills: 0 | Status: DISCONTINUED | OUTPATIENT
Start: 2018-09-01 | End: 2018-09-01

## 2018-09-01 RX ORDER — VANCOMYCIN HCL 1 G
1000 VIAL (EA) INTRAVENOUS ONCE
Qty: 0 | Refills: 0 | Status: COMPLETED | OUTPATIENT
Start: 2018-09-01 | End: 2018-09-01

## 2018-09-01 RX ORDER — ACETAMINOPHEN 500 MG
975 TABLET ORAL ONCE
Qty: 0 | Refills: 0 | Status: DISCONTINUED | OUTPATIENT
Start: 2018-09-01 | End: 2018-09-01

## 2018-09-01 RX ORDER — VANCOMYCIN HCL 1 G
1500 VIAL (EA) INTRAVENOUS ONCE
Qty: 0 | Refills: 0 | Status: COMPLETED | OUTPATIENT
Start: 2018-09-01 | End: 2018-09-01

## 2018-09-01 RX ADMIN — SODIUM CHLORIDE 1000 MILLILITER(S): 9 INJECTION INTRAMUSCULAR; INTRAVENOUS; SUBCUTANEOUS at 09:40

## 2018-09-01 RX ADMIN — SODIUM CHLORIDE 2000 MILLILITER(S): 9 INJECTION INTRAMUSCULAR; INTRAVENOUS; SUBCUTANEOUS at 21:19

## 2018-09-01 RX ADMIN — Medication 15 MILLIGRAM(S): at 09:40

## 2018-09-01 RX ADMIN — Medication 250 MILLIGRAM(S): at 21:53

## 2018-09-01 RX ADMIN — Medication 300 MILLIGRAM(S): at 12:29

## 2018-09-01 RX ADMIN — CEFEPIME 100 MILLIGRAM(S): 1 INJECTION, POWDER, FOR SOLUTION INTRAMUSCULAR; INTRAVENOUS at 11:01

## 2018-09-01 NOTE — ED ADULT NURSE NOTE - NSIMPLEMENTINTERV_GEN_ALL_ED
Implemented All Universal Safety Interventions:  [place] to call system. Call bell, personal items and telephone within reach. Instruct patient to call for assistance. Room bathroom lighting operational. Non-slip footwear when patient is off stretcher. Physically safe environment: no spills, clutter or unnecessary equipment. Stretcher in lowest position, wheels locked, appropriate side rails in place.

## 2018-09-01 NOTE — ED PROVIDER NOTE - OBJECTIVE STATEMENT
Pt is a 49 y/o Male, PMHX of cocaine use, low WBC & immune system, presents to ED for fever and weakness. Pt reports for the last week, he has had chills, felt feverish, no documented fever, and felt weak. Pt presented to the [place] site earlier in the day, was febrile & tachy. Pt is a 49 y/o Male, PMHX of cocaine use, low WBC & immune system, presents to ED for fever and weakness. Pt reports for the last week, he has had chills, felt feverish, no documented fever, and felt weak. Pt presented to the [facility] earlier in the day, was febrile & tachy. Had bloodwork done. Was admitted for neutropenic fever, and left because he states he wanted his Neupogen shot, and he did not get it. Pt would like to be admitted to the [facility].

## 2018-09-01 NOTE — ED PROVIDER NOTE - PHYSICAL EXAMINATION
VITAL SIGNS: I have reviewed the initial vital signs.   CONSTITUTIONAL: Awake, alert. Well-developed; well-nourished; in no distress. Non-toxic appearing.   SKIN: No rash, vesicles/lesion, abrasions or lacerations. No ecchymosis or signs of trauma.   HEAD: Normocephalic; atraumatic.   EYES: Symmetrical, no discharge or signs of trauma.   NECK: Supple; non-tender.  CARD: No chest wall deformity or tenderness. S1, S2 normal; no murmurs, gallops, or rubs. Regular rate and rhythm.  RESP: Good air movement. Lungs CTAB. No crackles, wheezes, rales or rhonchi.  ABD: Soft; non-distended; non-tender.   EXT: No bony deformity or tenderness. Normal ROM x 4 extremities.   NEURO: A&Ox3. GCS 15. Normal speech. CN 2-12 intact. Strength 5/5 UE/LE b/l. No sensory deficits. n/v intact UE/LE b/l, pulses symmetrical.

## 2018-09-01 NOTE — ED PROVIDER NOTE - PROGRESS NOTE DETAILS
Pt was seen eralier at [facility]. Febrile, tachy, found to have WBC 0.68, was started on abx, given fluids, Tylenol, admitted for neutropenic fever. Pt wanted neupogen shot, report they would not give it to him, so he left and came to [facility] ED. Will draw labs, give fluids. Pt not febrile or tachy at this time. Pt was seen earlier at [facility]. Febrile, tachy, found to have WBC 0.68, was started on abx, given fluids, Tylenol, admitted for neutropenic fever. Pt wanted neupogen shot, report they would not give it to him, so he left and came to [facility] ED. Will draw labs, give fluids. Pt not febrile or tachy at this time. Awaiting u/a. Results of urine back. Will admit for neutropenic fever.

## 2018-09-01 NOTE — ED PROVIDER NOTE - PHYSICAL EXAMINATION
Vital Signs: I have reviewed the initial vital signs.  Constitutional: NAD, well-nourished, appears stated age, no acute distress.  HEENT: Airway patent, moist MM, no erythema/swelling/deformity of oral structures. EOMI, PERRLA.  CV: regular rate, regular rhythm, well-perfused extremities, 2+ b/l DP and radial pulses equal.  Lungs: BCTA, no increased WOB.  ABD: NTND, no guarding or rebound, no pulsatile mass, no hernias.   MSK: Neck supple, nontender, nl ROM, no stepoff. Chest nontender. Back nontender in TLS spine or to b/l bony structures or flanks. Ext nontender, nl rom, no deformity.   INTEG: Skin warm, dry, (+) several round erythematous papules on posterior aspect of bilateral hands, sparing of palms/soles, no intra-oral lesions. No fluctuance/drainage  NEURO: A&Ox3, CN II-XII intact, normal strength 5/5 all 4 ext, nl sensation throughout, normal speech and coordination.  PSYCH: Calm, cooperative, normal affect and interaction.

## 2018-09-01 NOTE — H&P ADULT - NSHPPHYSICALEXAM_GEN_ALL_CORE
ICU Vital Signs Last 24 Hrs  T(C): 37 (01 Sep 2018 12:07), Max: 38.1 (01 Sep 2018 08:25)  T(F): 98.6 (01 Sep 2018 12:07), Max: 100.6 (01 Sep 2018 08:25)  HR: 105 (01 Sep 2018 08:25) (105 - 105)  BP: 117/73 (01 Sep 2018 08:25) (117/73 - 117/73)  BP(mean): --  ABP: --  ABP(mean): --  RR: 18 (01 Sep 2018 08:25) (18 - 18)  SpO2: 97% (01 Sep 2018 08:25) (97% - 97%) ICU Vital Signs Last 24 Hrs  T(C): 37 (01 Sep 2018 12:07), Max: 38.1 (01 Sep 2018 08:25)  T(F): 98.6 (01 Sep 2018 12:07), Max: 100.6 (01 Sep 2018 08:25)  HR: 105 (01 Sep 2018 08:25) (105 - 105)  BP: 117/73 (01 Sep 2018 08:25) (117/73 - 117/73)  BP(mean): --  ABP: --  ABP(mean): --  RR: 18 (01 Sep 2018 08:25) (18 - 18)  SpO2: 97% (01 Sep 2018 08:25) (97% - 97%)    Physical examination     General: No acute distress- Patient speaking in full sentences  Abdominal: soft, non distended, non tender   Pulmonary: Normal breathing sounds with no added breath sound  CVS: S1 + S2 no murmur audible   Extremities: Multiple erythematous papules with central ulceration, No edema

## 2018-09-01 NOTE — ED PROVIDER NOTE - PROGRESS NOTE DETAILS
Patient seen and evaluated by me. Labs/imaging ordered. Started on 1L NS IV and given tylenol PO for fever. Review of old records - patient seen here on May 15th - no significant WBC abnormalities, patient was discharged with abx for cellulitis of hands. Called from lab - patient found to have WBC count of 0.68, differential pending. Given low WBC count and febrile in ED, ordered broad spectrum abx. Patient hemodynamically stable Spoke with heme/onc fellow - will come see patient. Says patient does not need ICU admission and no isolation precautions at this time - patient can go to floor on IV abx. Recommends uric acid, peripheral smear, LDH, reticulocyte count. Spoke with MAR for admission.

## 2018-09-01 NOTE — H&P ADULT - ASSESSMENT
This patient is 49 y/o male with past medical history of alcohol abuse and episodic neutropenia. Patient is admitted to hospital with chief complaint of fevers and chills for 3-4 days.     Assessment and plan     1- Febrile Neutropenia   - Cause of neutropenia is not known   - Heme/Onc on board: Start patient on cefepime, vancomycin, diflucan and acyclovir   - This patient is 47 y/o male with past medical history of alcohol abuse and episodic neutropenia. Patient is admitted to hospital with chief complaint of fevers and chills for 3-4 days.     Assessment and plan     1- Febrile Neutropenia   - Cause of neutropenia is not known   - Heme/Onc on board: Start patient on cefepime, vancomycin, diflucan and acyclovir   - No neupogen for now   - Blood cultures urine cultures sent   - HIV, hepatitis panel, Folate, vitamin b12, iron studies, LDH, uric acid and retic count, urine drug screen, alcohol level to send    - ID consult to be placed   - Will follow Heme/Onc     Patient eloped before completion of this H&P

## 2018-09-01 NOTE — ED PROVIDER NOTE - MEDICAL DECISION MAKING DETAILS
Patient presented to ER with fever, found to be neutropenic and leukopenic. Patient has hx of this he states, but last time he was here (May 2018), his blood counts were not remarkable. Patient covered with IV abx and admitted with heme/onc on consult.

## 2018-09-01 NOTE — CONSULT NOTE ADULT - SUBJECTIVE AND OBJECTIVE BOX
48 yrs old male patient with history of alcohol abuse and cocaine abuse complicated by neutropenia is here for fever and chils for 4 days. 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 He also noted small painful lesions on hands and chest . No mouth pain or ulcers , no sore throat, no cough or dyspnea, no chest pain , no diarrhea or urinary symptoms. Patient had similar symptoms in the past , admitted multiple times from 2012 till 2015 . Every admission, he was found neutropenic , and his neutropenia coincides with cocaine use. In october 2012 , he had a bone marrow biopsy which was negative for any blast population. In December 2013, flow cytometry showed 3% CD45 blasts. he was given neupogen for 3-5 days and apropriate antibiotics and then discharged home.  Patient denies illicit drug use, stated that he used some cocaine few months ago , denies alcohol intake.  He lost follow up since 2015 .   Upon review of old records , all workup was negative - and neutropenia was attributed to alcohol and cocaine use. His lowest wbc was 1.88 at some point with a neutrophil count of 0.    PMH: alcohol and cocaine use  PSH: NO  FH: no family history or blood disorders  SH: Denies smoking, alcohol or illicit drug use    Vital Signs Last 24 Hrs  T(C): 37 (01 Sep 2018 12:07), Max: 38.1 (01 Sep 2018 08:25)  T(F): 98.6 (01 Sep 2018 12:07), Max: 100.6 (01 Sep 2018 08:25)  HR: 105 (01 Sep 2018 08:25) (105 - 105)  BP: 117/73 (01 Sep 2018 08:25) (117/73 - 117/73)  BP(mean): --  RR: 18 (01 Sep 2018 08:25) (18 - 18)  SpO2: 97% (01 Sep 2018 08:25) (97% - 97%)    PHYSICAL EXAM:  Constitutional: shivering in bed,  poor hygiene  Eyes: pale conjunctiva  ENMT: non erythematous tonsils, no oral thrush or mouth ulcers  Neck: No palpable lymph nodes  Respiratory: GBAE  Cardiovascular: RRR, no murmurs  Gastrointestinal: Soft non tender, non distended , no palpable spleen o liver  Extremities: no LE edema  Skin: few erythematous lesions with central scarring , non blanching on palpation  Lymph Nodes: no palpable LN    CXR reviewed : No acute cardiopulmonary process                          11.0   0.68  )-----------( 235      ( 01 Sep 2018 08:52 )             33.4   09-01    137  |  101  |  11  ----------------------------<  136<H>  3.6   |  24  |  0.9    Ca    8.1<L>      01 Sep 2018 08:52    TPro  6.1  /  Alb  3.3<L>  /  TBili  0.7  /  DBili  x   /  AST  19  /  ALT  24  /  AlkPhos  61  09-01

## 2018-09-01 NOTE — H&P ADULT - NSHPLABSRESULTS_GEN_ALL_CORE
11.0   0.68  )-----------( 235      ( 01 Sep 2018 08:52 )             33.4     09-01    137  |  101  |  11  ----------------------------<  136<H>  3.6   |  24  |  0.9    Ca    8.1<L>      01 Sep 2018 08:52    TPro  6.1  /  Alb  3.3<L>  /  TBili  0.7  /  DBili  x   /  AST  19  /  ALT  24  /  AlkPhos  61  09-01

## 2018-09-01 NOTE — ED ADULT NURSE NOTE - NS ED NURSE ELOPE COMMENTS
Pt eloped, did not agree with plan of care. IV removed. risks explained by RN. Pt with stable gait. refused to sign AMA papers. admitting team notified.

## 2018-09-01 NOTE — ED ADULT NURSE NOTE - OBJECTIVE STATEMENT
Patient complains of fever x 4 days with rash to extremities, states he has history of "immune system disorder," unsure of previous diagnosis

## 2018-09-01 NOTE — ED PROVIDER NOTE - NS ED ROS FT
Except as documented in HPI, all other ROS negative.   GENERAL: + fever/chills.  SKIN: Denies rashes, abrasions, lacerations, ecchymosis, erythema, or edema.  HEAD: Denies headache, dizziness or trauma.  ENT: Denies earaches, discharge or hearing loss. Denies nasal discharge or epistaxis. Denies sore throat.   CARDIAC: Denies chest pain, palpitations, or SOB.   RESPIRATORY: Denies SOB, cough, hemoptysis or wheezing.   GI: Denies abdominal pain, n/v/d.   : Denies hematuria, dysuria or frequency.   MSK: Denies myalgias, bony deformity or pain.   NEURO: + weakness.

## 2018-09-01 NOTE — ED ADULT NURSE NOTE - NSIMPLEMENTINTERV_GEN_ALL_ED
Implemented All Universal Safety Interventions:  [place] to call system. Call bell, personal items and telephone within reach. Instruct patient to call for assistance. Room bathroom lighting operational. Non-slip footwear when patient is off stretcher. Physically safe environment: no spills, clutter or unnecessary equipment. Stretcher in lowest position, wheels locked, appropriate side rails in place.

## 2018-09-01 NOTE — ED PROVIDER NOTE - MEDICAL DECISION MAKING DETAILS
I personally evaluated the patient. I reviewed the Resident’s or Physician Assistant’s note (as assigned above), and agree with the findings and plan except as documented in my note.  Chart reviewed. H/O neutropenia, presents with fever and weakness. Was at [facility] earlier but left AMA. Exam shows alert patient in no distress, HEENT NCAT, throat clear, neck supple, lungs huang,r rR S1S2, abdomens oft NT +BS, +pustules right hand. Labs noted for neutropenia. UA and CXR negative. Given IVF and Vancomycin. Will admit.

## 2018-09-01 NOTE — H&P ADULT - HISTORY OF PRESENT ILLNESS
This patient is 49 y/o male with past medical history of alcohol abuse and episodic neutropenia This patient is 47 y/o male with past medical history of alcohol abuse and episodic neutropenia. Patient is admitted to hospital with chief complaint of fevers and chills for 3-4 days. He also complains of multiple pustules which have erupted with this fever. As per patient he has had such episodes (around 10). Most of these episodes were associated with sore throat. As per patient he has received neupogen in past to which he responded really well. Patients first episode was in 2015 when he was 43 years old. Patient works in [employer].   Patient denies dysuria, eye pain, ear discharge, headache, cough, sore throat, diarrhea, abdominal pain, recent tick bite, trauma or travelling.

## 2018-09-01 NOTE — CONSULT NOTE ADULT - ASSESSMENT
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 Would consider ID consult to advise regarding appropriate Abx coverage  - No neupogen for now . Although workup ( Bone marrow biopsy, flow cytometry) is negative in the past, patient lost followup for 3 years - We will need to     reevaluate before further recommendations   Peripheral smear reviewed : No evidence of blasts  - Send for HIV, hepatitis panel, Folate, vitamin b12, iron studies, LDH, uric acid and retic count, urine drug screen, alcohol level  - Patient denies alcohol or drug use - however would watch for any signs of withdrawal    Case discussed with Dr Sesay   Plan : Start antibiotics           Repeat blood work           Will follow

## 2018-09-01 NOTE — ED PROVIDER NOTE - OBJECTIVE STATEMENT
48 year old male with self-stated "low WBC count" presenting with fever x 4 days at home. Patient states he normally gets shots of nubogen in the ED sometimes for this fever, although he is unsure. Denies other complaints except for a rash on his hands, which he states is typical of his prior febrile attacks. Denies headache, vision changes, weakness/numbness, confusion, URI symptoms, neck pain, chest pain, back pain, dyspnea, cough, palpitations, nausea, vomiting, abdominal pain, diarrhea, constipation, blood in stool/dark stools, urinary symptoms, penile discharge/testicular pain, leg swelling, rash, recent travel or sick contacts.

## 2018-09-02 DIAGNOSIS — Z87.898 PERSONAL HISTORY OF OTHER SPECIFIED CONDITIONS: ICD-10-CM

## 2018-09-02 DIAGNOSIS — L08.9 LOCAL INFECTION OF THE SKIN AND SUBCUTANEOUS TISSUE, UNSPECIFIED: ICD-10-CM

## 2018-09-02 DIAGNOSIS — D70.9 NEUTROPENIA, UNSPECIFIED: ICD-10-CM

## 2018-09-02 LAB
ALBUMIN SERPL ELPH-MCNC: 3 G/DL — LOW (ref 3.5–5.2)
ALP SERPL-CCNC: 56 U/L — SIGNIFICANT CHANGE UP (ref 30–115)
ALT FLD-CCNC: 22 U/L — SIGNIFICANT CHANGE UP (ref 0–41)
ANION GAP SERPL CALC-SCNC: 11 MMOL/L — SIGNIFICANT CHANGE UP (ref 7–14)
APPEARANCE UR: CLEAR — SIGNIFICANT CHANGE UP
AST SERPL-CCNC: 16 U/L — SIGNIFICANT CHANGE UP (ref 0–41)
BACTERIA # UR AUTO: ABNORMAL
BILIRUB SERPL-MCNC: 0.5 MG/DL — SIGNIFICANT CHANGE UP (ref 0.2–1.2)
BILIRUB UR-MCNC: NEGATIVE — SIGNIFICANT CHANGE UP
BUN SERPL-MCNC: 11 MG/DL — SIGNIFICANT CHANGE UP (ref 10–20)
CALCIUM SERPL-MCNC: 7.7 MG/DL — LOW (ref 8.5–10.1)
CHLORIDE SERPL-SCNC: 104 MMOL/L — SIGNIFICANT CHANGE UP (ref 98–110)
CK SERPL-CCNC: 94 U/L — SIGNIFICANT CHANGE UP (ref 0–225)
CO2 SERPL-SCNC: 24 MMOL/L — SIGNIFICANT CHANGE UP (ref 17–32)
COD CRY URNS QL: NEGATIVE — SIGNIFICANT CHANGE UP
COLOR SPEC: YELLOW — SIGNIFICANT CHANGE UP
CREAT SERPL-MCNC: 0.8 MG/DL — SIGNIFICANT CHANGE UP (ref 0.7–1.5)
DIFF PNL FLD: ABNORMAL
EPI CELLS # UR: ABNORMAL /HPF
GLUCOSE BLDC GLUCOMTR-MCNC: 122 MG/DL — HIGH (ref 70–99)
GLUCOSE SERPL-MCNC: 124 MG/DL — HIGH (ref 70–99)
GLUCOSE UR QL: NEGATIVE MG/DL — SIGNIFICANT CHANGE UP
GRAN CASTS # UR COMP ASSIST: NEGATIVE — SIGNIFICANT CHANGE UP
HCT VFR BLD CALC: 31.3 % — LOW (ref 42–52)
HGB BLD-MCNC: 10.2 G/DL — LOW (ref 14–18)
HYALINE CASTS # UR AUTO: NEGATIVE — SIGNIFICANT CHANGE UP
KETONES UR-MCNC: NEGATIVE — SIGNIFICANT CHANGE UP
LEUKOCYTE ESTERASE UR-ACNC: NEGATIVE — SIGNIFICANT CHANGE UP
MCHC RBC-ENTMCNC: 27.1 PG — SIGNIFICANT CHANGE UP (ref 27–31)
MCHC RBC-ENTMCNC: 32.6 G/DL — SIGNIFICANT CHANGE UP (ref 32–37)
MCV RBC AUTO: 83.2 FL — SIGNIFICANT CHANGE UP (ref 80–94)
NITRITE UR-MCNC: NEGATIVE — SIGNIFICANT CHANGE UP
NRBC # BLD: 0 /100 WBCS — SIGNIFICANT CHANGE UP (ref 0–0)
PH UR: 7 — SIGNIFICANT CHANGE UP (ref 5–8)
PLATELET # BLD AUTO: 234 K/UL — SIGNIFICANT CHANGE UP (ref 130–400)
POTASSIUM SERPL-MCNC: 3.8 MMOL/L — SIGNIFICANT CHANGE UP (ref 3.5–5)
POTASSIUM SERPL-SCNC: 3.8 MMOL/L — SIGNIFICANT CHANGE UP (ref 3.5–5)
PROT SERPL-MCNC: 5.7 G/DL — LOW (ref 6–8)
PROT UR-MCNC: NEGATIVE MG/DL — SIGNIFICANT CHANGE UP
RBC # BLD: 3.76 M/UL — LOW (ref 4.7–6.1)
RBC # FLD: 13.7 % — SIGNIFICANT CHANGE UP (ref 11.5–14.5)
RBC CASTS # UR COMP ASSIST: ABNORMAL /HPF
SODIUM SERPL-SCNC: 139 MMOL/L — SIGNIFICANT CHANGE UP (ref 135–146)
SP GR SPEC: 1.01 — SIGNIFICANT CHANGE UP (ref 1.01–1.03)
TRI-PHOS CRY UR QL COMP ASSIST: NEGATIVE — SIGNIFICANT CHANGE UP
TROPONIN T SERPL-MCNC: <0.01 NG/ML — SIGNIFICANT CHANGE UP
URATE CRY FLD QL MICRO: NEGATIVE — SIGNIFICANT CHANGE UP
UROBILINOGEN FLD QL: 0.2 MG/DL — SIGNIFICANT CHANGE UP (ref 0.2–0.2)
WBC # BLD: 1.22 K/UL — LOW (ref 4.8–10.8)
WBC # FLD AUTO: 1.22 K/UL — LOW (ref 4.8–10.8)
WBC UR QL: NEGATIVE — SIGNIFICANT CHANGE UP

## 2018-09-02 RX ORDER — PANTOPRAZOLE SODIUM 20 MG/1
40 TABLET, DELAYED RELEASE ORAL DAILY
Qty: 0 | Refills: 0 | Status: DISCONTINUED | OUTPATIENT
Start: 2018-09-02 | End: 2018-09-06

## 2018-09-02 RX ORDER — ACETAMINOPHEN 500 MG
975 TABLET ORAL ONCE
Qty: 0 | Refills: 0 | Status: DISCONTINUED | OUTPATIENT
Start: 2018-09-02 | End: 2018-09-06

## 2018-09-02 RX ORDER — ACETAMINOPHEN 500 MG
650 TABLET ORAL EVERY 6 HOURS
Qty: 0 | Refills: 0 | Status: DISCONTINUED | OUTPATIENT
Start: 2018-09-02 | End: 2018-09-06

## 2018-09-02 RX ORDER — CEFEPIME 1 G/1
1000 INJECTION, POWDER, FOR SOLUTION INTRAMUSCULAR; INTRAVENOUS EVERY 12 HOURS
Qty: 0 | Refills: 0 | Status: DISCONTINUED | OUTPATIENT
Start: 2018-09-02 | End: 2018-09-04

## 2018-09-02 RX ORDER — SODIUM CHLORIDE 9 MG/ML
1000 INJECTION INTRAMUSCULAR; INTRAVENOUS; SUBCUTANEOUS
Qty: 0 | Refills: 0 | Status: DISCONTINUED | OUTPATIENT
Start: 2018-09-02 | End: 2018-09-06

## 2018-09-02 RX ORDER — ACETAMINOPHEN 500 MG
650 TABLET ORAL ONCE
Qty: 0 | Refills: 0 | Status: DISCONTINUED | OUTPATIENT
Start: 2018-09-02 | End: 2018-09-02

## 2018-09-02 RX ORDER — FILGRASTIM 480MCG/1.6
480 VIAL (ML) INJECTION DAILY
Qty: 0 | Refills: 0 | Status: COMPLETED | OUTPATIENT
Start: 2018-09-02 | End: 2018-09-04

## 2018-09-02 RX ORDER — FLUCONAZOLE 150 MG/1
200 TABLET ORAL EVERY 24 HOURS
Qty: 0 | Refills: 0 | Status: DISCONTINUED | OUTPATIENT
Start: 2018-09-02 | End: 2018-09-04

## 2018-09-02 RX ORDER — ACYCLOVIR SODIUM 500 MG
1000 VIAL (EA) INTRAVENOUS EVERY 8 HOURS
Qty: 0 | Refills: 0 | Status: DISCONTINUED | OUTPATIENT
Start: 2018-09-02 | End: 2018-09-02

## 2018-09-02 RX ORDER — VANCOMYCIN HCL 1 G
1000 VIAL (EA) INTRAVENOUS EVERY 12 HOURS
Qty: 0 | Refills: 0 | Status: DISCONTINUED | OUTPATIENT
Start: 2018-09-02 | End: 2018-09-02

## 2018-09-02 RX ORDER — ACYCLOVIR SODIUM 500 MG
500 VIAL (EA) INTRAVENOUS EVERY 8 HOURS
Qty: 0 | Refills: 0 | Status: DISCONTINUED | OUTPATIENT
Start: 2018-09-02 | End: 2018-09-04

## 2018-09-02 RX ORDER — KETOROLAC TROMETHAMINE 30 MG/ML
30 SYRINGE (ML) INJECTION ONCE
Qty: 0 | Refills: 0 | Status: DISCONTINUED | OUTPATIENT
Start: 2018-09-02 | End: 2018-09-02

## 2018-09-02 RX ORDER — IBUPROFEN 200 MG
600 TABLET ORAL EVERY 6 HOURS
Qty: 0 | Refills: 0 | Status: DISCONTINUED | OUTPATIENT
Start: 2018-09-02 | End: 2018-09-06

## 2018-09-02 RX ORDER — ONDANSETRON 8 MG/1
4 TABLET, FILM COATED ORAL ONCE
Qty: 0 | Refills: 0 | Status: COMPLETED | OUTPATIENT
Start: 2018-09-02 | End: 2018-09-02

## 2018-09-02 RX ORDER — HEPARIN SODIUM 5000 [USP'U]/ML
5000 INJECTION INTRAVENOUS; SUBCUTANEOUS EVERY 12 HOURS
Qty: 0 | Refills: 0 | Status: DISCONTINUED | OUTPATIENT
Start: 2018-09-02 | End: 2018-09-02

## 2018-09-02 RX ADMIN — PANTOPRAZOLE SODIUM 40 MILLIGRAM(S): 20 TABLET, DELAYED RELEASE ORAL at 20:00

## 2018-09-02 RX ADMIN — Medication 30 MILLIGRAM(S): at 19:10

## 2018-09-02 RX ADMIN — ONDANSETRON 4 MILLIGRAM(S): 8 TABLET, FILM COATED ORAL at 17:29

## 2018-09-02 RX ADMIN — FLUCONAZOLE 100 MILLIGRAM(S): 150 TABLET ORAL at 11:13

## 2018-09-02 RX ADMIN — Medication 650 MILLIGRAM(S): at 10:41

## 2018-09-02 RX ADMIN — Medication 250 MILLIGRAM(S): at 06:51

## 2018-09-02 RX ADMIN — Medication 600 MILLIGRAM(S): at 17:29

## 2018-09-02 RX ADMIN — Medication 600 MILLIGRAM(S): at 18:13

## 2018-09-02 RX ADMIN — Medication 650 MILLIGRAM(S): at 13:21

## 2018-09-02 RX ADMIN — CEFEPIME 100 MILLIGRAM(S): 1 INJECTION, POWDER, FOR SOLUTION INTRAMUSCULAR; INTRAVENOUS at 06:15

## 2018-09-02 RX ADMIN — Medication 650 MILLIGRAM(S): at 11:11

## 2018-09-02 RX ADMIN — Medication 110 MILLIGRAM(S): at 06:50

## 2018-09-02 RX ADMIN — Medication 110 MILLIGRAM(S): at 22:00

## 2018-09-02 RX ADMIN — Medication 30 MILLIGRAM(S): at 18:13

## 2018-09-02 RX ADMIN — Medication 110 MILLIGRAM(S): at 13:19

## 2018-09-02 RX ADMIN — Medication 110 MILLIGRAM(S): at 13:22

## 2018-09-02 RX ADMIN — SODIUM CHLORIDE 75 MILLILITER(S): 9 INJECTION INTRAMUSCULAR; INTRAVENOUS; SUBCUTANEOUS at 07:57

## 2018-09-02 RX ADMIN — Medication 480 MICROGRAM(S): at 13:20

## 2018-09-02 RX ADMIN — CEFEPIME 100 MILLIGRAM(S): 1 INJECTION, POWDER, FOR SOLUTION INTRAMUSCULAR; INTRAVENOUS at 17:29

## 2018-09-02 NOTE — H&P ADULT - NSHPSOCIALHISTORY_GEN_ALL_CORE
no tobacco use and states no current alcohol use +cocaine use no tobacco use and states no current alcohol use but abuse inpast. + ille no tobacco use and states no current alcohol use but abuse in past per chart review. Apparent cocaine use a few months ago but none recent

## 2018-09-02 NOTE — H&P ADULT - NSHPLABSRESULTS_GEN_ALL_CORE
11.8   0.82  )-----------( 268      ( 01 Sep 2018 21:44 )             36.0         135  |  98  |  12  ----------------------------<  166<H>  4.0   |  24  |  1.1    Ca    8.6      01 Sep 2018 21:44  Mg     1.8         TPro  7.0  /  Alb  3.8  /  TBili  0.6  /  DBili  x   /  AST  29  /  ALT  30  /  AlkPhos  69            Urinalysis Basic - ( 02 Sep 2018 00:00 )    Color: Yellow / Appearance: Clear / S.015 / pH: x  Gluc: x / Ketone: Negative  / Bili: Negative / Urobili: 0.2 mg/dL   Blood: x / Protein: Negative mg/dL / Nitrite: Negative   Leuk Esterase: Negative / RBC: x / WBC x   Sq Epi: x / Non Sq Epi: x / Bacteria: x      PT/INR - ( 01 Sep 2018 21:44 )   PT: 17.90 sec;   INR: 1.64 ratio         PTT - ( 01 Sep 2018 21:44 )  PTT:39.7 sec  Lactate Trend   @ 21:44 Lactate:0.8    @ 10:10 Lactate:0.6         CAPILLARY BLOOD GLUCOSE

## 2018-09-02 NOTE — CONSULT NOTE ADULT - SUBJECTIVE AND OBJECTIVE BOX
ADRIANNA GARZA  48y, Male  Allergy: No Known Allergies      HPI:  47yo male presents with the above (He was admitted to the [facility] earlier but left AMA because he didn't get neupogen as he requested) Actually has history of neutropenia which he states is followed by the development of sores first to his neck and oral region and then spreads until he gets above mentioned medication He tells me this process has been going on for several years but etiology of his blood disorder is unknown (ER notes mention immune system but patient states early thinking was due to his drug use or due to stress) For a few years he didn't have a problem but it has now returned. Patient has had weakness, fevers and chills along with developing ulcers to his hands over last 4 days. Works in [employer] (02 Sep 2018 02:38)    FAMILY HISTORY:  No pertinent family history in first degree relatives    PAST MEDICAL & SURGICAL HISTORY:  Illicit drug use  Alcoholism  No significant past surgical history        VITALS:  T(F): 102.7, Max: 103.1 (18 @ 10:44)  HR: 70  BP: 107/62  RR: 18Vital Signs Last 24 Hrs  T(C): 39.3 (02 Sep 2018 11:58), Max: 39.5 (02 Sep 2018 10:44)  T(F): 102.7 (02 Sep 2018 11:58), Max: 103.1 (02 Sep 2018 10:44)  HR: 70 (02 Sep 2018 06:48) (61 - 90)  BP: 107/62 (02 Sep 2018 06:48) (101/63 - 123/70)  BP(mean): --  RR: 18 (02 Sep 2018 06:48) (17 - 18)  SpO2: 96% (02 Sep 2018 02:45) (96% - 99%)    TESTS & MEASUREMENTS:                        10.2   1.22  )-----------( 234      ( 02 Sep 2018 06:36 )             31.3     -    139  |  104  |  11  ----------------------------<  124<H>  3.8   |  24  |  0.8    Ca    7.7<L>      02 Sep 2018 06:36  Mg     1.8     09-    TPro  5.7<L>  /  Alb  3.0<L>  /  TBili  0.5  /  DBili  x   /  AST  16  /  ALT  22  /  AlkPhos  56  09-02    LIVER FUNCTIONS - ( 02 Sep 2018 06:36 )  Alb: 3.0 g/dL / Pro: 5.7 g/dL / ALK PHOS: 56 U/L / ALT: 22 U/L / AST: 16 U/L / GGT: x             Urinalysis Basic - ( 02 Sep 2018 00:00 )    Color: Yellow / Appearance: Clear / S.015 / pH: x  Gluc: x / Ketone: Negative  / Bili: Negative / Urobili: 0.2 mg/dL   Blood: x / Protein: Negative mg/dL / Nitrite: Negative   Leuk Esterase: Negative / RBC: 5-10 /HPF / WBC Negative   Sq Epi: x / Non Sq Epi: Occasional /HPF / Bacteria: Few          RADIOLOGY & ADDITIONAL TESTS:    ANTIBIOTICS:  acyclovir IVPB 500 milliGRAM(s) IV Intermittent every 8 hours  cefepime   IVPB 1000 milliGRAM(s) IV Intermittent every 12 hours  fluconAZOLE IVPB 200 milliGRAM(s) IV Intermittent every 24 hours  vancomycin  IVPB 1000 milliGRAM(s) IV Intermittent every 12 hours

## 2018-09-02 NOTE — PROVIDER CONTACT NOTE (OTHER) - BACKGROUND
Neutropenic fever. Increased temp since admission. Tylenol ineffective. ABT changed as per hematology.

## 2018-09-02 NOTE — H&P ADULT - PROBLEM SELECTOR PLAN 1
for now continue IV vancomycin started in the ER with IV fluids and tylenol prn. Consults to hematology and Infectious disease for now continue IV vancomycin started in the ER with IV fluids and tylenol prn. Consider restarting regimen that was going to be ordered at [facility] (In addition to vanco, cefepime, acyclovir and diflucan) Consults to hematology and Infectious disease

## 2018-09-02 NOTE — H&P ADULT - EXTREMITIES COMMENTS
pustules and mild swelling noted to distal aspects of upper extremities pustules and mild swelling noted to distal aspects of  right upper extremity

## 2018-09-02 NOTE — CONSULT NOTE ADULT - SUBJECTIVE AND OBJECTIVE BOX
48y old  Male presents with fever for a few days, mouth sore, swollen glands in the neck and skin infection.      HPI:  49yo male presented to ER at [facility] yesterday  with the above complains. He was found to have leukopenia with WBC 0.8, ANC 0, mild anemia and normal PLT. He was febrile. He was started on broad spectrum ABx with Vancomycin, Zosyn and prophylactic ABx with acyclovir and diflucan.  He left [place] because he didn't get Neupogen as he requested. He then presented to [facility] ER. Overnight, he still has spiking fever. He complains mouth sore and neck swollen. He has multiple skin sores. The patient has a history of febrile neutropenia in the past. He had bone marrow biopsy in 10/2012 which did not reveal abnormal finding. He was given Neupogen for febrile neutropenia before. He was addicted to cocaine. He noted that he became sick with neutropenia and similar symptoms every time after he used cocaine. He was good for last 3 years because he stopped cocaine. However, he got cocaine from someone and sniffed it about a week ago. Two days after that, he started fever and all symptoms described above.        ROS: as above       PAST MEDICAL & SURGICAL HISTORY:  Illicit drug use  Alcoholism  No significant past surgical history      SOCIAL HISTORY:    FAMILY HISTORY:  No pertinent family history in first degree relatives      MEDICATIONS  (STANDING):  acetaminophen  Suppository 975 milliGRAM(s) Rectal once  acyclovir IVPB 500 milliGRAM(s) IV Intermittent every 8 hours  cefepime   IVPB 1000 milliGRAM(s) IV Intermittent every 12 hours  fluconAZOLE IVPB 200 milliGRAM(s) IV Intermittent every 24 hours  ondansetron Injectable 4 milliGRAM(s) IV Push once  sodium chloride 0.9%. 1000 milliLiter(s) (75 mL/Hr) IV Continuous <Continuous>  vancomycin  IVPB 1000 milliGRAM(s) IV Intermittent every 12 hours    MEDICATIONS  (PRN):  acetaminophen   Tablet. 650 milliGRAM(s) Oral every 6 hours PRN Mild Pain (1 - 3)      Allergies    No Known Allergies    Intolerances        Vital Signs Last 24 Hrs  T(C): 39.3 (02 Sep 2018 11:58), Max: 39.5 (02 Sep 2018 10:44)  T(F): 102.7 (02 Sep 2018 11:58), Max: 103.1 (02 Sep 2018 10:44)  HR: 70 (02 Sep 2018 06:48) (61 - 90)  BP: 107/62 (02 Sep 2018 06:48) (101/63 - 123/70)  BP(mean): --  RR: 18 (02 Sep 2018 06:48) (17 - 18)  SpO2: 96% (02 Sep 2018 02:45) (96% - 99%)    PHYSICAL EXAM  General: adult in NAD  HEENT: clear oropharynx, anicteric sclera, pink conjunctiva  Neck: supple  CV: normal S1/S2 with no murmur rubs or gallops  Lungs: positive air movement b/l ant lungs,clear to auscultation, no wheezes, no rales  Abdomen: soft non-tender non-distended, no hepatosplenomegaly  Ext: no clubbing cyanosis or edema  Skin: no rashes and no petechiae  Neuro: alert and oriented X 4, no focal deficits      LABS:                          10.2   1.22  )-----------( 234      ( 02 Sep 2018 06:36 )             31.3         Mean Cell Volume : 83.2 fL  Mean Cell Hemoglobin : 27.1 pg  Mean Cell Hemoglobin Concentration : 32.6 g/dL  Auto Neutrophil # : x  Auto Lymphocyte # : x  Auto Monocyte # : x  Auto Eosinophil # : x  Auto Basophil # : x  Auto Neutrophil % : x  Auto Lymphocyte % : x  Auto Monocyte % : x  Auto Eosinophil % : x  Auto Basophil % : x      Serial CBC's  09-02 @ 06:36  Hct-31.3 / Hgb-10.2 / Plat-234 / RBC-3.76 / WBC-1.22  Serial CBC's  09-01 @ 21:44  Hct-36.0 / Hgb-11.8 / Plat-268 / RBC-4.36 / WBC-0.82  Serial CBC's  09-01 @ 08:52  Hct-33.4 / Hgb-11.0 / Plat-235 / RBC-4.10 / WBC-0.68  Serial CBC's  09-01 @ 04:30  Hct-32.2 / Hgb-10.7 / Plat-208 / RBC-3.96 / WBC-0.80      09-02    139  |  104  |  11  ----------------------------<  124<H>  3.8   |  24  |  0.8    Ca    7.7<L>      02 Sep 2018 06:36  Mg     1.8     09-01    TPro  5.7<L>  /  Alb  3.0<L>  /  TBili  0.5  /  DBili  x   /  AST  16  /  ALT  22  /  AlkPhos  56  09-02      PT/INR - ( 01 Sep 2018 21:44 )   PT: 17.90 sec;   INR: 1.64 ratio         PTT - ( 01 Sep 2018 21:44 )  PTT:39.7 sec                RADIOLOGY & ADDITIONAL STUDIES: 48y old  Male presents with fever for a few days, mouth sore, swollen glands in the neck and skin infection.      HPI:  47yo male presented to ER at [facility] yesterday  with the above complains. He was found to have leukopenia with WBC 0.8, ANC 0, mild anemia and normal PLT. He was febrile. He was started on broad spectrum ABx with Vancomycin, Zosyn and prophylactic ABx with acyclovir and diflucan.  He left [place] because he didn't get Neupogen as he requested. He then presented to [facility] ER. Overnight, he still has spiking fever. He complains mouth sore and neck swollen. He has multiple skin sores. The patient has a history of febrile neutropenia in the past. He had bone marrow biopsy in 10/2012 which did not reveal abnormal finding. He was given Neupogen for febrile neutropenia before. He was addicted to cocaine. He noted that he became sick with neutropenia and similar symptoms every time after he used cocaine. He was good for last 3 years because he stopped cocaine. However, he got cocaine from someone and sniffed it about a week ago. Two days after that, he started fever and all symptoms described above.        ROS: as above       PAST MEDICAL & SURGICAL HISTORY:  Illicit drug use  Alcoholism  No significant past surgical history      SOCIAL HISTORY:    FAMILY HISTORY:  No pertinent family history in first degree relatives      MEDICATIONS  (STANDING):  acetaminophen  Suppository 975 milliGRAM(s) Rectal once  acyclovir IVPB 500 milliGRAM(s) IV Intermittent every 8 hours  cefepime   IVPB 1000 milliGRAM(s) IV Intermittent every 12 hours  fluconAZOLE IVPB 200 milliGRAM(s) IV Intermittent every 24 hours  ondansetron Injectable 4 milliGRAM(s) IV Push once  sodium chloride 0.9%. 1000 milliLiter(s) (75 mL/Hr) IV Continuous <Continuous>  vancomycin  IVPB 1000 milliGRAM(s) IV Intermittent every 12 hours    MEDICATIONS  (PRN):  acetaminophen   Tablet. 650 milliGRAM(s) Oral every 6 hours PRN Mild Pain (1 - 3)      Allergies    No Known Allergies    Intolerances        Vital Signs Last 24 Hrs  T(C): 39.3 (02 Sep 2018 11:58), Max: 39.5 (02 Sep 2018 10:44)  T(F): 102.7 (02 Sep 2018 11:58), Max: 103.1 (02 Sep 2018 10:44)  HR: 70 (02 Sep 2018 06:48) (61 - 90)  BP: 107/62 (02 Sep 2018 06:48) (101/63 - 123/70)  BP(mean): --  RR: 18 (02 Sep 2018 06:48) (17 - 18)  SpO2: 96% (02 Sep 2018 02:45) (96% - 99%)    PHYSICAL EXAM  General: adult in NAD  HEENT: clear oropharynx, anicteric sclera, pink conjunctiva  Neck: supple, palpable enlarged submandibular lymph node  CV: normal S1/S2 with no murmur rubs or gallops  Lungs: positive air movement b/l ant lungs,clear to auscultation, no wheezes, no rales  Abdomen: soft non-tender non-distended, no hepatosplenomegaly  Ext: no clubbing cyanosis or edema  Skin: scattered sores.  Neuro: alert and oriented X 4, no focal deficits      LABS:                          10.2   1.22  )-----------( 234      ( 02 Sep 2018 06:36 )             31.3         Mean Cell Volume : 83.2 fL  Mean Cell Hemoglobin : 27.1 pg  Mean Cell Hemoglobin Concentration : 32.6 g/dL  Auto Neutrophil # : x  Auto Lymphocyte # : x  Auto Monocyte # : x  Auto Eosinophil # : x  Auto Basophil # : x  Auto Neutrophil % : x  Auto Lymphocyte % : x  Auto Monocyte % : x  Auto Eosinophil % : x  Auto Basophil % : x      Serial CBC's  09-02 @ 06:36  Hct-31.3 / Hgb-10.2 / Plat-234 / RBC-3.76 / WBC-1.22  Serial CBC's  09-01 @ 21:44  Hct-36.0 / Hgb-11.8 / Plat-268 / RBC-4.36 / WBC-0.82  Serial CBC's  09-01 @ 08:52  Hct-33.4 / Hgb-11.0 / Plat-235 / RBC-4.10 / WBC-0.68  Serial CBC's  09-01 @ 04:30  Hct-32.2 / Hgb-10.7 / Plat-208 / RBC-3.96 / WBC-0.80      09-02    139  |  104  |  11  ----------------------------<  124<H>  3.8   |  24  |  0.8    Ca    7.7<L>      02 Sep 2018 06:36  Mg     1.8     09-01    TPro  5.7<L>  /  Alb  3.0<L>  /  TBili  0.5  /  DBili  x   /  AST  16  /  ALT  22  /  AlkPhos  56  09-02      PT/INR - ( 01 Sep 2018 21:44 )   PT: 17.90 sec;   INR: 1.64 ratio         PTT - ( 01 Sep 2018 21:44 )  PTT:39.7 sec                RADIOLOGY & ADDITIONAL STUDIES:

## 2018-09-02 NOTE — CONSULT NOTE ADULT - ASSESSMENT
47 yo male has sudden onset neutropenia fever, likely cocaine induced.     Recommendation:  -- Continue Vancomycin 1 gm q12h, change Cefepime dose to 2 gm q8h, prophylactic ABx with Acyclovir 400 mg po q12h and diflucan 200 mg po q12h.  -- ID consult.  -- Followup blood and urine culture.  -- Discussed with the patient about BM biopsy. He declined. Since he had similar problem after cocaine use in the past, his new onset neutropenic fever is most likely due to the drug.    -- Supportive care, IVF, Tylenol prn for fever.   -- Check CBC with differentials daily.
IMPRESSION:  Neutropenia.  Inflammatory ulcers with no abscess but has cellulitis.  Drug induced neutropenia?    RECOMMENDATIONS:  Doxycycline 100 mg iv q12h  Nares for ORSA  D/C Vancomycin

## 2018-09-02 NOTE — PROVIDER CONTACT NOTE (OTHER) - SITUATION
Continued c/o chills and being cold. Oral temp 103.9, total of 1300mg tylenol given earlier this shift.

## 2018-09-02 NOTE — H&P ADULT - HISTORY OF PRESENT ILLNESS
49yo male presents with the above (He was admitted to the [facility] earlier but left AMA because he didn't get neupogen as he requested) Actually has history of neutropenia which he states is followed by the development of sores first to his neck and oral region and then spreads until he gets above mentioned medication He tells me this process has been going on for several years but etiology of his blood disorder is unknown (ER notes mention immune disorder) 47yo male presents with the above (He was admitted to the [facility] earlier but left AMA because he didn't get neupogen as he requested) Actually has history of neutropenia which he states is followed by the development of sores first to his neck and oral region and then spreads until he gets above mentioned medication He tells me this process has been going on for several years but etiology of his blood disorder is unknown (ER notes mention immune system but patient states early thinking was due to his drug use or due to stress) For a few years he didn't have a problem but it has now returned. Patient has had weakness, fevers and chills along with developing ulcers to his hands 49yo male presents with the above (He was admitted to the [facility] earlier but left AMA because he didn't get neupogen as he requested) Actually has history of neutropenia which he states is followed by the development of sores first to his neck and oral region and then spreads until he gets above mentioned medication He tells me this process has been going on for several years but etiology of his blood disorder is unknown (ER notes mention immune system but patient states early thinking was due to his drug use or due to stress) For a few years he didn't have a problem but it has now returned. Patient has had weakness, fevers and chills along with developing ulcers to his hands over last 4 days 47yo male presents with the above (He was admitted to the [facility] earlier but left AMA because he didn't get neupogen as he requested) Actually has history of neutropenia which he states is followed by the development of sores first to his neck and oral region and then spreads until he gets above mentioned medication He tells me this process has been going on for several years but etiology of his blood disorder is unknown (ER notes mention immune system but patient states early thinking was due to his drug use or due to stress) For a few years he didn't have a problem but it has now returned. Patient has had weakness, fevers and chills along with developing ulcers to his hands over last 4 days. Works in [employer]

## 2018-09-03 DIAGNOSIS — D64.9 ANEMIA, UNSPECIFIED: ICD-10-CM

## 2018-09-03 LAB
ANION GAP SERPL CALC-SCNC: 10 MMOL/L — SIGNIFICANT CHANGE UP (ref 7–14)
BUN SERPL-MCNC: 9 MG/DL — LOW (ref 10–20)
CALCIUM SERPL-MCNC: 7.4 MG/DL — LOW (ref 8.5–10.1)
CHLORIDE SERPL-SCNC: 102 MMOL/L — SIGNIFICANT CHANGE UP (ref 98–110)
CO2 SERPL-SCNC: 25 MMOL/L — SIGNIFICANT CHANGE UP (ref 17–32)
CREAT SERPL-MCNC: 0.8 MG/DL — SIGNIFICANT CHANGE UP (ref 0.7–1.5)
CULTURE RESULTS: NO GROWTH — SIGNIFICANT CHANGE UP
GLUCOSE SERPL-MCNC: 115 MG/DL — HIGH (ref 70–99)
HCT VFR BLD CALC: 29.9 % — LOW (ref 42–52)
HGB BLD-MCNC: 9.8 G/DL — LOW (ref 14–18)
MCHC RBC-ENTMCNC: 27.3 PG — SIGNIFICANT CHANGE UP (ref 27–31)
MCHC RBC-ENTMCNC: 32.8 G/DL — SIGNIFICANT CHANGE UP (ref 32–37)
MCV RBC AUTO: 83.3 FL — SIGNIFICANT CHANGE UP (ref 80–94)
NRBC # BLD: 0 /100 WBCS — SIGNIFICANT CHANGE UP (ref 0–0)
PLATELET # BLD AUTO: 203 K/UL — SIGNIFICANT CHANGE UP (ref 130–400)
POTASSIUM SERPL-MCNC: 3.4 MMOL/L — LOW (ref 3.5–5)
POTASSIUM SERPL-SCNC: 3.4 MMOL/L — LOW (ref 3.5–5)
RBC # BLD: 3.59 M/UL — LOW (ref 4.7–6.1)
RBC # FLD: 14 % — SIGNIFICANT CHANGE UP (ref 11.5–14.5)
SODIUM SERPL-SCNC: 137 MMOL/L — SIGNIFICANT CHANGE UP (ref 135–146)
SPECIMEN SOURCE: SIGNIFICANT CHANGE UP
WBC # BLD: 1.15 K/UL — LOW (ref 4.8–10.8)
WBC # FLD AUTO: 1.15 K/UL — LOW (ref 4.8–10.8)

## 2018-09-03 RX ADMIN — Medication 110 MILLIGRAM(S): at 17:28

## 2018-09-03 RX ADMIN — Medication 600 MILLIGRAM(S): at 16:12

## 2018-09-03 RX ADMIN — Medication 110 MILLIGRAM(S): at 06:37

## 2018-09-03 RX ADMIN — Medication 110 MILLIGRAM(S): at 21:49

## 2018-09-03 RX ADMIN — Medication 600 MILLIGRAM(S): at 04:34

## 2018-09-03 RX ADMIN — FLUCONAZOLE 100 MILLIGRAM(S): 150 TABLET ORAL at 11:42

## 2018-09-03 RX ADMIN — SODIUM CHLORIDE 75 MILLILITER(S): 9 INJECTION INTRAMUSCULAR; INTRAVENOUS; SUBCUTANEOUS at 06:37

## 2018-09-03 RX ADMIN — Medication 600 MILLIGRAM(S): at 14:48

## 2018-09-03 RX ADMIN — Medication 480 MICROGRAM(S): at 11:42

## 2018-09-03 RX ADMIN — CEFEPIME 100 MILLIGRAM(S): 1 INJECTION, POWDER, FOR SOLUTION INTRAMUSCULAR; INTRAVENOUS at 05:16

## 2018-09-03 RX ADMIN — PANTOPRAZOLE SODIUM 40 MILLIGRAM(S): 20 TABLET, DELAYED RELEASE ORAL at 11:43

## 2018-09-03 RX ADMIN — Medication 110 MILLIGRAM(S): at 14:48

## 2018-09-03 RX ADMIN — CEFEPIME 100 MILLIGRAM(S): 1 INJECTION, POWDER, FOR SOLUTION INTRAMUSCULAR; INTRAVENOUS at 17:28

## 2018-09-03 RX ADMIN — Medication 600 MILLIGRAM(S): at 05:21

## 2018-09-04 DIAGNOSIS — B37.0 CANDIDAL STOMATITIS: ICD-10-CM

## 2018-09-04 DIAGNOSIS — L03.90 CELLULITIS, UNSPECIFIED: ICD-10-CM

## 2018-09-04 LAB
ALBUMIN SERPL ELPH-MCNC: 2.7 G/DL — LOW (ref 3.5–5.2)
ALP SERPL-CCNC: 57 U/L — SIGNIFICANT CHANGE UP (ref 30–115)
ALT FLD-CCNC: 21 U/L — SIGNIFICANT CHANGE UP (ref 0–41)
ANION GAP SERPL CALC-SCNC: 10 MMOL/L — SIGNIFICANT CHANGE UP (ref 7–14)
AST SERPL-CCNC: 15 U/L — SIGNIFICANT CHANGE UP (ref 0–41)
BILIRUB SERPL-MCNC: 0.4 MG/DL — SIGNIFICANT CHANGE UP (ref 0.2–1.2)
BUN SERPL-MCNC: 8 MG/DL — LOW (ref 10–20)
CALCIUM SERPL-MCNC: 7.8 MG/DL — LOW (ref 8.5–10.1)
CHLORIDE SERPL-SCNC: 103 MMOL/L — SIGNIFICANT CHANGE UP (ref 98–110)
CO2 SERPL-SCNC: 27 MMOL/L — SIGNIFICANT CHANGE UP (ref 17–32)
CREAT SERPL-MCNC: 0.7 MG/DL — SIGNIFICANT CHANGE UP (ref 0.7–1.5)
GLUCOSE SERPL-MCNC: 109 MG/DL — HIGH (ref 70–99)
HCT VFR BLD CALC: 31 % — LOW (ref 42–52)
HGB BLD-MCNC: 10.2 G/DL — LOW (ref 14–18)
MANUAL DIF COMMENT BLD-IMP: SIGNIFICANT CHANGE UP
MCHC RBC-ENTMCNC: 27.1 PG — SIGNIFICANT CHANGE UP (ref 27–31)
MCHC RBC-ENTMCNC: 32.9 G/DL — SIGNIFICANT CHANGE UP (ref 32–37)
MCV RBC AUTO: 82.2 FL — SIGNIFICANT CHANGE UP (ref 80–94)
NRBC # BLD: 0 /100 WBCS — SIGNIFICANT CHANGE UP (ref 0–0)
PLATELET # BLD AUTO: 220 K/UL — SIGNIFICANT CHANGE UP (ref 130–400)
POTASSIUM SERPL-MCNC: 3.4 MMOL/L — LOW (ref 3.5–5)
POTASSIUM SERPL-SCNC: 3.4 MMOL/L — LOW (ref 3.5–5)
PROT SERPL-MCNC: 5.4 G/DL — LOW (ref 6–8)
RBC # BLD: 3.77 M/UL — LOW (ref 4.7–6.1)
RBC # FLD: 14.2 % — SIGNIFICANT CHANGE UP (ref 11.5–14.5)
SODIUM SERPL-SCNC: 140 MMOL/L — SIGNIFICANT CHANGE UP (ref 135–146)
WBC # BLD: 1.74 K/UL — LOW (ref 4.8–10.8)
WBC # FLD AUTO: 1.74 K/UL — LOW (ref 4.8–10.8)

## 2018-09-04 RX ORDER — VANCOMYCIN HCL 1 G
VIAL (EA) INTRAVENOUS
Qty: 0 | Refills: 0 | Status: DISCONTINUED | OUTPATIENT
Start: 2018-09-04 | End: 2018-09-06

## 2018-09-04 RX ORDER — VANCOMYCIN HCL 1 G
1750 VIAL (EA) INTRAVENOUS EVERY 12 HOURS
Qty: 0 | Refills: 0 | Status: DISCONTINUED | OUTPATIENT
Start: 2018-09-04 | End: 2018-09-06

## 2018-09-04 RX ORDER — VANCOMYCIN HCL 1 G
1750 VIAL (EA) INTRAVENOUS ONCE
Qty: 0 | Refills: 0 | Status: COMPLETED | OUTPATIENT
Start: 2018-09-04 | End: 2018-09-04

## 2018-09-04 RX ORDER — POTASSIUM CHLORIDE 20 MEQ
40 PACKET (EA) ORAL EVERY 4 HOURS
Qty: 0 | Refills: 0 | Status: COMPLETED | OUTPATIENT
Start: 2018-09-04 | End: 2018-09-04

## 2018-09-04 RX ORDER — FLUCONAZOLE 150 MG/1
100 TABLET ORAL DAILY
Qty: 0 | Refills: 0 | Status: DISCONTINUED | OUTPATIENT
Start: 2018-09-04 | End: 2018-09-06

## 2018-09-04 RX ADMIN — Medication 40 MILLIEQUIVALENT(S): at 21:19

## 2018-09-04 RX ADMIN — Medication 650 MILLIGRAM(S): at 16:56

## 2018-09-04 RX ADMIN — Medication 250 MILLIGRAM(S): at 09:56

## 2018-09-04 RX ADMIN — Medication 40 MILLIEQUIVALENT(S): at 17:10

## 2018-09-04 RX ADMIN — Medication 250 MILLIGRAM(S): at 17:09

## 2018-09-04 RX ADMIN — FLUCONAZOLE 100 MILLIGRAM(S): 150 TABLET ORAL at 11:52

## 2018-09-04 RX ADMIN — Medication 600 MILLIGRAM(S): at 01:54

## 2018-09-04 RX ADMIN — Medication 40 MILLIEQUIVALENT(S): at 13:11

## 2018-09-04 RX ADMIN — Medication 110 MILLIGRAM(S): at 06:54

## 2018-09-04 RX ADMIN — CEFEPIME 100 MILLIGRAM(S): 1 INJECTION, POWDER, FOR SOLUTION INTRAMUSCULAR; INTRAVENOUS at 06:53

## 2018-09-04 RX ADMIN — PANTOPRAZOLE SODIUM 40 MILLIGRAM(S): 20 TABLET, DELAYED RELEASE ORAL at 11:52

## 2018-09-04 RX ADMIN — Medication 480 MICROGRAM(S): at 11:52

## 2018-09-04 RX ADMIN — Medication 110 MILLIGRAM(S): at 06:53

## 2018-09-05 LAB
ALBUMIN SERPL ELPH-MCNC: 2.8 G/DL — LOW (ref 3.5–5.2)
ALP SERPL-CCNC: 68 U/L — SIGNIFICANT CHANGE UP (ref 30–115)
ALT FLD-CCNC: 31 U/L — SIGNIFICANT CHANGE UP (ref 0–41)
ANION GAP SERPL CALC-SCNC: 11 MMOL/L — SIGNIFICANT CHANGE UP (ref 7–14)
AST SERPL-CCNC: 27 U/L — SIGNIFICANT CHANGE UP (ref 0–41)
BASOPHILS # BLD AUTO: 0.04 K/UL — SIGNIFICANT CHANGE UP (ref 0–0.2)
BASOPHILS NFR BLD AUTO: 0.5 % — SIGNIFICANT CHANGE UP (ref 0–1)
BILIRUB SERPL-MCNC: 0.3 MG/DL — SIGNIFICANT CHANGE UP (ref 0.2–1.2)
BUN SERPL-MCNC: 7 MG/DL — LOW (ref 10–20)
CALCIUM SERPL-MCNC: 8.4 MG/DL — LOW (ref 8.5–10.1)
CHLORIDE SERPL-SCNC: 101 MMOL/L — SIGNIFICANT CHANGE UP (ref 98–110)
CO2 SERPL-SCNC: 27 MMOL/L — SIGNIFICANT CHANGE UP (ref 17–32)
CREAT SERPL-MCNC: 0.8 MG/DL — SIGNIFICANT CHANGE UP (ref 0.7–1.5)
EOSINOPHIL # BLD AUTO: 0.16 K/UL — SIGNIFICANT CHANGE UP (ref 0–0.7)
EOSINOPHIL NFR BLD AUTO: 2 % — SIGNIFICANT CHANGE UP (ref 0–8)
GLUCOSE SERPL-MCNC: 143 MG/DL — HIGH (ref 70–99)
HCT VFR BLD CALC: 34.1 % — LOW (ref 42–52)
HGB BLD-MCNC: 11 G/DL — LOW (ref 14–18)
IMM GRANULOCYTES NFR BLD AUTO: 17.3 % — HIGH (ref 0.1–0.3)
LYMPHOCYTES # BLD AUTO: 1.3 K/UL — SIGNIFICANT CHANGE UP (ref 1.2–3.4)
LYMPHOCYTES # BLD AUTO: 16.3 % — LOW (ref 20.5–51.1)
MCHC RBC-ENTMCNC: 26.3 PG — LOW (ref 27–31)
MCHC RBC-ENTMCNC: 32.3 G/DL — SIGNIFICANT CHANGE UP (ref 32–37)
MCV RBC AUTO: 81.6 FL — SIGNIFICANT CHANGE UP (ref 80–94)
MONOCYTES # BLD AUTO: 0.85 K/UL — HIGH (ref 0.1–0.6)
MONOCYTES NFR BLD AUTO: 10.7 % — HIGH (ref 1.7–9.3)
NEUTROPHILS # BLD AUTO: 4.24 K/UL — SIGNIFICANT CHANGE UP (ref 1.4–6.5)
NEUTROPHILS NFR BLD AUTO: 53.2 % — SIGNIFICANT CHANGE UP (ref 42.2–75.2)
NRBC # BLD: 0 /100 WBCS — SIGNIFICANT CHANGE UP (ref 0–0)
PLAT MORPH BLD: NORMAL — SIGNIFICANT CHANGE UP
PLATELET # BLD AUTO: 268 K/UL — SIGNIFICANT CHANGE UP (ref 130–400)
POTASSIUM SERPL-MCNC: 4 MMOL/L — SIGNIFICANT CHANGE UP (ref 3.5–5)
POTASSIUM SERPL-SCNC: 4 MMOL/L — SIGNIFICANT CHANGE UP (ref 3.5–5)
PROT SERPL-MCNC: 5.7 G/DL — LOW (ref 6–8)
RBC # BLD: 4.18 M/UL — LOW (ref 4.7–6.1)
RBC # FLD: 14.4 % — SIGNIFICANT CHANGE UP (ref 11.5–14.5)
RBC BLD AUTO: NORMAL — SIGNIFICANT CHANGE UP
SODIUM SERPL-SCNC: 139 MMOL/L — SIGNIFICANT CHANGE UP (ref 135–146)
WBC # BLD: 7.97 K/UL — SIGNIFICANT CHANGE UP (ref 4.8–10.8)
WBC # FLD AUTO: 7.97 K/UL — SIGNIFICANT CHANGE UP (ref 4.8–10.8)

## 2018-09-05 RX ADMIN — Medication 250 MILLIGRAM(S): at 05:16

## 2018-09-05 RX ADMIN — PANTOPRAZOLE SODIUM 40 MILLIGRAM(S): 20 TABLET, DELAYED RELEASE ORAL at 12:19

## 2018-09-05 RX ADMIN — Medication 250 MILLIGRAM(S): at 17:19

## 2018-09-05 RX ADMIN — FLUCONAZOLE 100 MILLIGRAM(S): 150 TABLET ORAL at 12:18

## 2018-09-05 RX ADMIN — SODIUM CHLORIDE 75 MILLILITER(S): 9 INJECTION INTRAMUSCULAR; INTRAVENOUS; SUBCUTANEOUS at 18:01

## 2018-09-05 NOTE — PROGRESS NOTE ADULT - ASSESSMENT
48 year old male with long standing history of cocaine use presents with pustules on hands and abdomen as well as swollen LN.  Found to have neutropenic fever.

## 2018-09-05 NOTE — PROGRESS NOTE ADULT - PROBLEM SELECTOR PLAN 3
Watch for signs of withdrawal - pt denies ETOH use

## 2018-09-06 ENCOUNTER — TRANSCRIPTION ENCOUNTER (OUTPATIENT)
Age: 48
End: 2018-09-06

## 2018-09-06 VITALS
DIASTOLIC BLOOD PRESSURE: 74 MMHG | HEART RATE: 60 BPM | RESPIRATION RATE: 16 BRPM | SYSTOLIC BLOOD PRESSURE: 104 MMHG | TEMPERATURE: 97 F

## 2018-09-06 LAB
BASOPHILS # BLD AUTO: 0.07 K/UL — SIGNIFICANT CHANGE UP (ref 0–0.2)
BASOPHILS NFR BLD AUTO: 1 % — SIGNIFICANT CHANGE UP (ref 0–1)
CULTURE RESULTS: SIGNIFICANT CHANGE UP
CULTURE RESULTS: SIGNIFICANT CHANGE UP
EOSINOPHIL # BLD AUTO: 0.15 K/UL — SIGNIFICANT CHANGE UP (ref 0–0.7)
EOSINOPHIL NFR BLD AUTO: 2.2 % — SIGNIFICANT CHANGE UP (ref 0–8)
HCT VFR BLD CALC: 36.2 % — LOW (ref 42–52)
HGB BLD-MCNC: 11.7 G/DL — LOW (ref 14–18)
IMM GRANULOCYTES NFR BLD AUTO: 30.2 % — HIGH (ref 0.1–0.3)
LYMPHOCYTES # BLD AUTO: 1.33 K/UL — SIGNIFICANT CHANGE UP (ref 1.2–3.4)
LYMPHOCYTES # BLD AUTO: 19.2 % — LOW (ref 20.5–51.1)
MCHC RBC-ENTMCNC: 26.8 PG — LOW (ref 27–31)
MCHC RBC-ENTMCNC: 32.3 G/DL — SIGNIFICANT CHANGE UP (ref 32–37)
MCV RBC AUTO: 82.8 FL — SIGNIFICANT CHANGE UP (ref 80–94)
MONOCYTES # BLD AUTO: 0.71 K/UL — HIGH (ref 0.1–0.6)
MONOCYTES NFR BLD AUTO: 10.2 % — HIGH (ref 1.7–9.3)
NEUTROPHILS # BLD AUTO: 2.58 K/UL — SIGNIFICANT CHANGE UP (ref 1.4–6.5)
NEUTROPHILS NFR BLD AUTO: 37.2 % — LOW (ref 42.2–75.2)
NRBC # BLD: 0 /100 WBCS — SIGNIFICANT CHANGE UP (ref 0–0)
PLATELET # BLD AUTO: 294 K/UL — SIGNIFICANT CHANGE UP (ref 130–400)
RBC # BLD: 4.37 M/UL — LOW (ref 4.7–6.1)
RBC # FLD: 15.1 % — HIGH (ref 11.5–14.5)
SPECIMEN SOURCE: SIGNIFICANT CHANGE UP
SPECIMEN SOURCE: SIGNIFICANT CHANGE UP
WBC # BLD: 6.93 K/UL — SIGNIFICANT CHANGE UP (ref 4.8–10.8)
WBC # FLD AUTO: 6.93 K/UL — SIGNIFICANT CHANGE UP (ref 4.8–10.8)

## 2018-09-06 RX ORDER — FLUCONAZOLE 150 MG/1
1 TABLET ORAL
Qty: 7 | Refills: 0 | OUTPATIENT
Start: 2018-09-06 | End: 2018-09-12

## 2018-09-06 RX ORDER — AZTREONAM 2 G
1 VIAL (EA) INJECTION
Qty: 14 | Refills: 0 | OUTPATIENT
Start: 2018-09-06 | End: 2018-09-12

## 2018-09-06 RX ADMIN — Medication 250 MILLIGRAM(S): at 05:25

## 2018-09-06 NOTE — PROGRESS NOTE ADULT - PROBLEM SELECTOR PLAN 2
Oral diflucan - at least 10 days
PO Diflucan 100 mg Q24 - 7 days  recall if needed
as above

## 2018-09-06 NOTE — DISCHARGE NOTE ADULT - PATIENT PORTAL LINK FT
You can access the [facility] Portal, offered by [facility], by registering with the following website: [email or web address]

## 2018-09-06 NOTE — DISCHARGE NOTE ADULT - HOSPITAL COURSE
48 year old male with long standing history of cocaine use presents with pustules on hands and abdomen as well as swollen LN.  Found to have neutropenic fever.      Problem/Plan - 1:  ·  Problem: Neutropenic fever.  Plan: continue IV abx as per ID - vanco and diflucan - change to PO bactrim and diflucan upon d/c  Oncology following  pt declined BM biopsy  s/p 3 doses of neupogen       Problem/Plan - 2:  ·  Problem: Pustules determined by examination.  Plan: as above.      Problem/Plan - 3:  ·  Problem: History of substance abuse.  Plan: Watch for signs of withdrawal - pt denies ETOH use.      Problem/Plan - 4:  ·  Problem: Anemia.  Plan: monitor CBC.   D/c home today  d/c planning took over 60 minutes

## 2018-09-06 NOTE — PROGRESS NOTE ADULT - PROBLEM SELECTOR PROBLEM 1
Cellulitis, unspecified cellulitis site
Cellulitis, unspecified cellulitis site
Neutropenic fever

## 2018-09-06 NOTE — PROGRESS NOTE ADULT - SUBJECTIVE AND OBJECTIVE BOX
ADRIANNA GARZA  48y  Male      Patient is a 48y old Male who presents with a chief complaint of neutropenia with infection (02 Sep 2018 02:38)      INTERVAL HPI/OVERNIGHT EVENTS:  complains of sores on hands, mandible and abdomen      REVIEW OF SYSTEMS:  CONSTITUTIONAL:  fatigue  EYES: No eye pain, visual disturbances, or discharge  ENMT:  c/o throat pain, ulcers  NECK: No pain or stiffness  RESPIRATORY: No cough, wheezing, chills or hemoptysis; No shortness of breath  CARDIOVASCULAR: No chest pain, palpitations, dizziness, or leg swelling  GASTROINTESTINAL: No abdominal or epigastric pain. No nausea, vomiting, or hematemesis; No diarrhea or constipation. No melena or hematochezia.  GENITOURINARY: No dysuria, frequency, hematuria, or incontinence  NEUROLOGICAL: No headaches, memory loss, loss of strength, numbness, or tremors  SKIN: multiple lesions on hands and abdomen  LYMPH NODES: + enlarged glands - submandibular  ENDOCRINE: No heat or cold intolerance; No hair loss  MUSCULOSKELETAL: No joint pain or swelling; No muscle, back, or extremity pain  PSYCHIATRIC: No depression, anxiety, mood swings, or difficulty sleeping  HEME/LYMPH: No easy bruising, or bleeding gums    Vital Signs Last 24 Hrs  T(C): 36.6 (04 Sep 2018 05:41), Max: 39.8 (03 Sep 2018 14:31)  T(F): 97.8 (04 Sep 2018 05:41), Max: 103.7 (03 Sep 2018 14:31)  HR: 74 (04 Sep 2018 05:41) (74 - 97)  BP: 105/55 (04 Sep 2018 05:41) (105/55 - 136/75)  BP(mean): --  RR: 16 (04 Sep 2018 05:41) (16 - 16)  SpO2: --    PHYSICAL EXAM:  GENERAL: NAD, well-groomed, well-developed  HEAD:  Atraumatic, Normocephalic  EYES: EOMI, PERRLA, conjunctiva and sclera clear  ENMT: tender submandibular area, + LN,  poor dentition,   NECK: Supple, No JVD, Normal thyroid  NERVOUS SYSTEM:  Alert & Oriented X3, Good concentration; Motor Strength 5/5 B/L upper and lower extremities; DTRs 2+ intact and symmetric  CHEST/LUNG: Clear to percussion bilaterally; No rales, rhonchi, wheezing, or rubs  HEART: Regular rate and rhythm; No murmurs, rubs, or gallops  ABDOMEN: Soft, Nontender, Nondistended; Bowel sounds present  EXTREMITIES:  2+ Peripheral Pulses, No clubbing, cyanosis, or edema  LYMPH: No lymphadenopathy noted  SKIN: multiple lesion on hands and abdomen    Consultant(s) Notes Reviewed:  [x ] YES  [ ] NO  Care Discussed with Consultants/Other Providers [ x] YES  [ ] NO    LAB:                          10.2   1.74  )-----------( 220      ( 04 Sep 2018 09:27 )             31.0     09-04    140  |  103  |  8<L>  ----------------------------<  109<H>  3.4<L>   |  27  |  0.7    Ca    7.8<L>      04 Sep 2018 09:27    TPro  5.4<L>  /  Alb  2.7<L>  /  TBili  0.4  /  DBili  x   /  AST  15  /  ALT  21  /  AlkPhos  57  -04      Daily     Daily   Drug Dosing Weight  Height (cm): 190.5 (02 Sep 2018 03:30)  Weight (kg): 103.9 (02 Sep 2018 03:30)  BMI (kg/m2): 28.6 (02 Sep 2018 03:30)  BSA (m2): 2.32 (02 Sep 2018 03:30)  CAPILLARY BLOOD GLUCOSE      POCT Blood Glucose.: 122 mg/dL (02 Sep 2018 16:34)    I&O's Summary    Urinalysis Basic - ( 02 Sep 2018 00:00 )    Color: Yellow / Appearance: Clear / S.015 / pH: x  Gluc: x / Ketone: Negative  / Bili: Negative / Urobili: 0.2 mg/dL   Blood: x / Protein: Negative mg/dL / Nitrite: Negative   Leuk Esterase: Negative / RBC: 5-10 /HPF / WBC Negative   Sq Epi: x / Non Sq Epi: Occasional /HPF / Bacteria: Few      LIVER FUNCTIONS - ( 02 Sep 2018 06:36 )  Alb: 3.0 g/dL / Pro: 5.7 g/dL / ALK PHOS: 56 U/L / ALT: 22 U/L / AST: 16 U/L / GGT: x               RADIOLOGY & ADDITIONAL TESTS:    Imaging Personally Reviewed:  [ ] YES  [ ] NO    HEALTH ISSUES - PROBLEM Dx:  History of substance abuse: History of substance abuse  Pustules determined by examination: Pustules determined by examination  Neutropenic fever: Neutropenic fever
ADRIANNA GARZA  48y  Male      Patient is a 48y old Male who presents with a chief complaint of neutropenia with infection (02 Sep 2018 02:38)      INTERVAL HPI/OVERNIGHT EVENTS:  complains of sores on hands, mandible and abdomen      REVIEW OF SYSTEMS:  CONSTITUTIONAL:  fatigue  EYES: No eye pain, visual disturbances, or discharge  ENMT:  c/o throat pain, ulcers  NECK: No pain or stiffness  RESPIRATORY: No cough, wheezing, chills or hemoptysis; No shortness of breath  CARDIOVASCULAR: No chest pain, palpitations, dizziness, or leg swelling  GASTROINTESTINAL: No abdominal or epigastric pain. No nausea, vomiting, or hematemesis; No diarrhea or constipation. No melena or hematochezia.  GENITOURINARY: No dysuria, frequency, hematuria, or incontinence  NEUROLOGICAL: No headaches, memory loss, loss of strength, numbness, or tremors  SKIN: multiple lesions on hands and abdomen  LYMPH NODES: + enlarged glands - submandibular  ENDOCRINE: No heat or cold intolerance; No hair loss  MUSCULOSKELETAL: No joint pain or swelling; No muscle, back, or extremity pain  PSYCHIATRIC: No depression, anxiety, mood swings, or difficulty sleeping  HEME/LYMPH: No easy bruising, or bleeding gums    Vital Signs Last 24 Hrs  T(C): 37.3 (03 Sep 2018 06:54), Max: 39.9 (02 Sep 2018 16:48)  T(F): 99.2 (03 Sep 2018 06:54), Max: 103.9 (02 Sep 2018 16:48)  HR: 95 (03 Sep 2018 06:54) (61 - 95)  BP: 115/62 (03 Sep 2018 06:54) (99/64 - 115/62)  BP(mean): --  RR: 16 (03 Sep 2018 06:54) (16 - 16)  SpO2: --      PHYSICAL EXAM:  GENERAL: NAD, well-groomed, well-developed  HEAD:  Atraumatic, Normocephalic  EYES: EOMI, PERRLA, conjunctiva and sclera clear  ENMT: tender submandibular area, + LN,  poor dentition,   NECK: Supple, No JVD, Normal thyroid  NERVOUS SYSTEM:  Alert & Oriented X3, Good concentration; Motor Strength 5/5 B/L upper and lower extremities; DTRs 2+ intact and symmetric  CHEST/LUNG: Clear to percussion bilaterally; No rales, rhonchi, wheezing, or rubs  HEART: Regular rate and rhythm; No murmurs, rubs, or gallops  ABDOMEN: Soft, Nontender, Nondistended; Bowel sounds present  EXTREMITIES:  2+ Peripheral Pulses, No clubbing, cyanosis, or edema  LYMPH: No lymphadenopathy noted  SKIN: multiple lesion on hands and abdomen    Consultant(s) Notes Reviewed:  [x ] YES  [ ] NO  Care Discussed with Consultants/Other Providers [ x] YES  [ ] NO    LAB:        137  |  102  |  9<L>  ----------------------------<  115<H>  3.4<L>   |  25  |  0.8    Ca    7.4<L>      03 Sep 2018 08:39    TPro  5.7<L>  /  Alb  3.0<L>  /  TBili  0.5  /  DBili  x   /  AST  16  /  ALT  22  /  AlkPhos  56  09-02    CARDIAC MARKERS ( 02 Sep 2018 18:30 )  x     / <0.01 ng/mL / 94 U/L / x     / x                                9.8    1.15  )-----------( 203      ( 03 Sep 2018 08:39 )             29.9     Daily     Daily   Drug Dosing Weight  Height (cm): 190.5 (02 Sep 2018 03:30)  Weight (kg): 103.9 (02 Sep 2018 03:30)  BMI (kg/m2): 28.6 (02 Sep 2018 03:30)  BSA (m2): 2.32 (02 Sep 2018 03:30)  CAPILLARY BLOOD GLUCOSE      POCT Blood Glucose.: 122 mg/dL (02 Sep 2018 16:34)    I&O's Summary    Urinalysis Basic - ( 02 Sep 2018 00:00 )    Color: Yellow / Appearance: Clear / S.015 / pH: x  Gluc: x / Ketone: Negative  / Bili: Negative / Urobili: 0.2 mg/dL   Blood: x / Protein: Negative mg/dL / Nitrite: Negative   Leuk Esterase: Negative / RBC: 5-10 /HPF / WBC Negative   Sq Epi: x / Non Sq Epi: Occasional /HPF / Bacteria: Few      LIVER FUNCTIONS - ( 02 Sep 2018 06:36 )  Alb: 3.0 g/dL / Pro: 5.7 g/dL / ALK PHOS: 56 U/L / ALT: 22 U/L / AST: 16 U/L / GGT: x               RADIOLOGY & ADDITIONAL TESTS:    Imaging Personally Reviewed:  [ ] YES  [ ] NO    HEALTH ISSUES - PROBLEM Dx:  History of substance abuse: History of substance abuse  Pustules determined by examination: Pustules determined by examination  Neutropenic fever: Neutropenic fever
ADRIANNA GARZA  48y  Male      Patient is a 48y old Male who presents with a chief complaint of neutropenia with infection (02 Sep 2018 02:38)      INTERVAL HPI/OVERNIGHT EVENTS:  complains of sores on hands, mandible and abdomen that have improved significantly since yesterday      REVIEW OF SYSTEMS:  CONSTITUTIONAL:  fatigue  EYES: No eye pain, visual disturbances, or discharge  ENMT:  c/o throat pain, ulcers that have improved since admission  NECK: No pain or stiffness  RESPIRATORY: No cough, wheezing, chills or hemoptysis; No shortness of breath  CARDIOVASCULAR: No chest pain, palpitations, dizziness, or leg swelling  GASTROINTESTINAL: No abdominal or epigastric pain. No nausea, vomiting, or hematemesis; No diarrhea or constipation. No melena or hematochezia.  GENITOURINARY: No dysuria, frequency, hematuria, or incontinence  NEUROLOGICAL: No headaches, memory loss, loss of strength, numbness, or tremors  SKIN: multiple lesions on hands and abdomen  LYMPH NODES: + enlarged glands - submandibular  ENDOCRINE: No heat or cold intolerance; No hair loss  MUSCULOSKELETAL: No joint pain or swelling; No muscle, back, or extremity pain  PSYCHIATRIC: No depression, anxiety, mood swings, or difficulty sleeping  HEME/LYMPH: No easy bruising, or bleeding gums    Vital Signs Last 24 Hrs  T(C): 36.8 (05 Sep 2018 10:30), Max: 37.2 (04 Sep 2018 14:29)  T(F): 98.3 (05 Sep 2018 10:30), Max: 98.9 (04 Sep 2018 14:29)  HR: 69 (05 Sep 2018 10:30) (69 - 77)  BP: 102/62 (05 Sep 2018 10:30) (102/62 - 111/69)  BP(mean): --  RR: 16 (05 Sep 2018 10:30) (16 - 16)  SpO2: --    PHYSICAL EXAM:  GENERAL: NAD, well-groomed, well-developed  HEAD:  Atraumatic, Normocephalic  EYES: EOMI, PERRLA, conjunctiva and sclera clear  ENMT: tender submandibular area, + LN,  poor dentition,   NECK: Supple, No JVD, Normal thyroid  NERVOUS SYSTEM:  Alert & Oriented X3, Good concentration; Motor Strength 5/5 B/L upper and lower extremities; DTRs 2+ intact and symmetric  CHEST/LUNG: Clear to percussion bilaterally; No rales, rhonchi, wheezing, or rubs  HEART: Regular rate and rhythm; No murmurs, rubs, or gallops  ABDOMEN: Soft, Nontender, Nondistended; Bowel sounds present  EXTREMITIES:  2+ Peripheral Pulses, No clubbing, cyanosis, or edema  LYMPH: No lymphadenopathy noted  SKIN: multiple lesion on hands and abdomen - improved since admission    Consultant(s) Notes Reviewed:  [x ] YES  [ ] NO  Care Discussed with Consultants/Other Providers [ x] YES  [ ] NO    LAB:                          11.0   7.97  )-----------( 268      ( 05 Sep 2018 06:54 )             34.1                         Daily     Daily   Drug Dosing Weight  Height (cm): 190.5 (02 Sep 2018 03:30)  Weight (kg): 103.9 (02 Sep 2018 03:30)  BMI (kg/m2): 28.6 (02 Sep 2018 03:30)  BSA (m2): 2.32 (02 Sep 2018 03:30)  CAPILLARY BLOOD GLUCOSE      POCT Blood Glucose.: 122 mg/dL (02 Sep 2018 16:34)    I&O's Summary    Urinalysis Basic - ( 02 Sep 2018 00:00 )    Color: Yellow / Appearance: Clear / S.015 / pH: x  Gluc: x / Ketone: Negative  / Bili: Negative / Urobili: 0.2 mg/dL   Blood: x / Protein: Negative mg/dL / Nitrite: Negative   Leuk Esterase: Negative / RBC: 5-10 /HPF / WBC Negative   Sq Epi: x / Non Sq Epi: Occasional /HPF / Bacteria: Few      LIVER FUNCTIONS - ( 02 Sep 2018 06:36 )  Alb: 3.0 g/dL / Pro: 5.7 g/dL / ALK PHOS: 56 U/L / ALT: 22 U/L / AST: 16 U/L / GGT: x               RADIOLOGY & ADDITIONAL TESTS:    Imaging Personally Reviewed:  [ ] YES  [ ] NO    HEALTH ISSUES - PROBLEM Dx:  History of substance abuse: History of substance abuse  Pustules determined by examination: Pustules determined by examination  Neutropenic fever: Neutropenic fever
infectious diseases progress note:  ADRIANNA GARZA is a 48yMale patient    NEUTROPENIC FEVER    Anemia  History of substance abuse  Pustules determined by examination  Neutropenic fever    PMH - cocaine induced neutropenia     FSH - not relevant    ROS:  not relevant   Allergies    No Known Allergies    Intolerances        ANTIBIOTICS/RELEVANT:  antimicrobials    immunologic:  [id number] Injectable 480 MICROGram(s) SubCutaneous daily    OTHER:  acetaminophen   Tablet 650 milliGRAM(s) Oral every 6 hours PRN  acetaminophen   Tablet. 650 milliGRAM(s) Oral every 6 hours PRN  acetaminophen  Suppository 975 milliGRAM(s) Rectal once  ibuprofen  Tablet 600 milliGRAM(s) Oral every 6 hours PRN  pantoprazole  Injectable 40 milliGRAM(s) IV Push daily  sodium chloride 0.9%. 1000 milliLiter(s) IV Continuous <Continuous>      Objective:  T(F): 97.8 (09-04-18 @ 05:41), Max: 103.7 (09-03-18 @ 14:31)  HR: 74 (09-04-18 @ 05:41) (74 - 97)  BP: 105/55 (09-04-18 @ 05:41) (105/55 - 136/75)  RR: 16 (09-04-18 @ 05:41) (16 - 16)  SpO2: --    PHYSICAL EXAM:  Constitutional:Well-developed, well nourished  Ear/Nose/Throat: oral thrush with left submandibular adenopathy  Neck:no JVD, no lymphadenopathy, supple  Respiratory: CTA vicente  Cardiovascular: S1S2 RRR  Gastrointestinal:soft, (+) BS, no HSM  Extremities:multiple areas of cellulitis - extremities        LABS:                        9.8    1.15  )-----------( 203      ( 03 Sep 2018 08:39 )             29.9     09-03    137  |  102  |  9<L>  ----------------------------<  115<H>  3.4<L>   |  25  |  0.8    Ca    7.4<L>      03 Sep 2018 08:39              MICROBIOLOGY:    Culture - Urine (collected 09-02-18 @ 00:00)  Source: .Urine Clean Catch (Midstream)  Final Report (09-03-18 @ 20:00):    No growth    Culture - Blood (collected 09-01-18 @ 21:44)  Source: .Blood Blood  Preliminary Report (09-03-18 @ 18:00):    No growth to date.    Culture - Blood (collected 09-01-18 @ 21:44)  Source: .Blood Blood  Preliminary Report (09-03-18 @ 18:00):    No growth to date.    Culture - Blood (collected 09-01-18 @ 10:09)  Source: .Blood Blood  Preliminary Report (09-02-18 @ 20:01):    No growth to date.    Culture - Blood (collected 09-01-18 @ 10:09)  Source: .Blood Blood  Preliminary Report (09-02-18 @ 20:01):    No growth to date.        Culture - Urine (collected 02 Sep 2018 00:00)  Source: .Urine Clean Catch (Midstream)  Final Report (03 Sep 2018 20:00):    No growth    Culture - Blood (collected 01 Sep 2018 21:44)  Source: .Blood Blood  Preliminary Report (03 Sep 2018 18:00):    No growth to date.    Culture - Blood (collected 01 Sep 2018 21:44)  Source: .Blood Blood  Preliminary Report (03 Sep 2018 18:00):    No growth to date.    Culture - Blood (collected 01 Sep 2018 10:09)  Source: .Blood Blood  Preliminary Report (02 Sep 2018 20:01):    No growth to date.    Culture - Blood (collected 01 Sep 2018 10:09)  Source: .Blood Blood  Preliminary Report (02 Sep 2018 20:01):    No growth to date.      Culture Results:   No growth (09-02 @ 00:00)  Culture Results:   No growth to date. (09-01 @ 21:44)  Culture Results:   No growth to date. (09-01 @ 21:44)  Culture Results:   No growth to date. (09-01 @ 10:09)  Culture Results:   No growth to date. (09-01 @ 10:09)        RADIOLOGY & ADDITIONAL STUDIES:
infectious diseases progress note:  ADRIANNA GARZA is a 48yMale patient    NEUTROPENIC FEVER    Thrush, oral  Cellulitis, unspecified cellulitis site  Anemia  History of substance abuse  Pustules determined by examination  Neutropenic fever      ROS:  not relevant     Allergies    No Known Allergies    Intolerances        ANTIBIOTICS/RELEVANT:  antimicrobials  fluconAZOLE   Tablet 100 milliGRAM(s) Oral daily  vancomycin  IVPB 1750 milliGRAM(s) IV Intermittent every 12 hours  vancomycin  IVPB        immunologic:    OTHER:  acetaminophen   Tablet 650 milliGRAM(s) Oral every 6 hours PRN  acetaminophen   Tablet. 650 milliGRAM(s) Oral every 6 hours PRN  acetaminophen  Suppository 975 milliGRAM(s) Rectal once  ibuprofen  Tablet 600 milliGRAM(s) Oral every 6 hours PRN  pantoprazole  Injectable 40 milliGRAM(s) IV Push daily  sodium chloride 0.9%. 1000 milliLiter(s) IV Continuous <Continuous>      Objective:  T(F): 97.3 (09-06-18 @ 06:00), Max: 99.1 (09-05-18 @ 21:45)  HR: 60 (09-06-18 @ 06:00) (60 - 87)  BP: 104/74 (09-06-18 @ 06:00) (102/62 - 133/77)  RR: 16 (09-06-18 @ 06:00) (16 - 16)  SpO2: --    PHYSICAL EXAM:  Constitutional:Well-developed, well nourished  Ear/Nose/Throat: thrush - better   Neck:no JVD, no lymphadenopathy, supple  Respiratory: CTA vicente  Cardiovascular: S1S2 RRR  Gastrointestinal:soft, (+) BS, no HSM  Extremities: healing skin abscesses        LABS:                        11.7   6.93  )-----------( 294      ( 06 Sep 2018 07:18 )             36.2     09-05    139  |  101  |  7<L>  ----------------------------<  143<H>  4.0   |  27  |  0.8    Ca    8.4<L>      05 Sep 2018 06:54    TPro  5.7<L>  /  Alb  2.8<L>  /  TBili  0.3  /  DBili  x   /  AST  27  /  ALT  31  /  AlkPhos  68  09-05            MICROBIOLOGY:    Culture - Urine (collected 09-02-18 @ 00:00)  Source: .Urine Clean Catch (Midstream)  Final Report (09-03-18 @ 20:00):    No growth    Culture - Blood (collected 09-01-18 @ 21:44)  Source: .Blood Blood  Preliminary Report (09-03-18 @ 18:00):    No growth to date.    Culture - Blood (collected 09-01-18 @ 21:44)  Source: .Blood Blood  Preliminary Report (09-03-18 @ 18:00):    No growth to date.    Culture - Blood (collected 09-01-18 @ 10:09)  Source: .Blood Blood  Preliminary Report (09-02-18 @ 20:01):    No growth to date.    Culture - Blood (collected 09-01-18 @ 10:09)  Source: .Blood Blood  Preliminary Report (09-02-18 @ 20:01):    No growth to date.        Culture Results:   No growth (09-02 @ 00:00)  Culture Results:   No growth to date. (09-01 @ 21:44)  Culture Results:   No growth to date. (09-01 @ 21:44)  Culture Results:   No growth to date. (09-01 @ 10:09)  Culture Results:   No growth to date. (09-01 @ 10:09)        RADIOLOGY & ADDITIONAL STUDIES:

## 2018-09-06 NOTE — DISCHARGE NOTE ADULT - CARE PROVIDER_API CALL
Hallie Sesay), Hematology; Internal Medicine; Medical Oncology  [address] [place] [address]  Phone: [phone]  Fax: [phone]

## 2018-09-06 NOTE — DISCHARGE NOTE ADULT - MEDICATION SUMMARY - MEDICATIONS TO TAKE
I will START or STAY ON the medications listed below when I get home from the hospital:    fluconazole 100 mg oral tablet  -- 1 tab(s) by mouth once a day  -- Indication: For Thrush, oral    SMZ-TMP  mg-160 mg oral tablet  -- 1 tab(s) by mouth 2 times a day   -- Avoid prolonged or excessive exposure to direct and/or artificial sunlight while taking this medication.  Finish all this medication unless otherwise directed by prescriber.  Medication should be taken with plenty of water.    -- Indication: For Neutropenic fever

## 2018-09-06 NOTE — PROGRESS NOTE ADULT - PROBLEM SELECTOR PLAN 1
Po BActrim DS 1 tab Q12 - 7 days
continue IV abx as per ID  need ID follow up  Oncology following  pt declined BM biopsy  continue neupogen   IV fluids and tylenol prn
continue IV abx as per ID - vanco and diflucan  Oncology following  pt declined BM biopsy  continue neupogen  IV fluids and tylenol prn
multiple sites  IV Vanco for now  Once better - ? PO bactrim - 10 days
continue IV abx as per ID - vanco and diflucan  Oncology following  pt declined BM biopsy  s/p 3 doses of neupogen  IV fluids and tylenol prn

## 2018-09-06 NOTE — PROGRESS NOTE ADULT - PROBLEM SELECTOR PROBLEM 2
Pustules determined by examination
Pustules determined by examination
Thrush, oral
Thrush, oral
Pustules determined by examination

## 2018-09-06 NOTE — DISCHARGE NOTE ADULT - CARE PLAN
Principal Discharge DX:	Neutropenic fever  Goal:	stop using cocaine  Assessment and plan of treatment:	stop using cocaine  Secondary Diagnosis:	Illicit drug use  Goal:	stop using cocaine  Assessment and plan of treatment:	stop using cocaine  Secondary Diagnosis:	Thrush, oral  Goal:	take medication as prescribed  Assessment and plan of treatment:	take medication as prescribed  Secondary Diagnosis:	Pustules determined by examination  Goal:	take medication as prescribed  Assessment and plan of treatment:	take medication as prescribed

## 2018-09-07 ENCOUNTER — INBOUND DOCUMENT (OUTPATIENT)
Age: 48
End: 2018-09-07

## 2018-09-07 LAB
CULTURE RESULTS: SIGNIFICANT CHANGE UP
CULTURE RESULTS: SIGNIFICANT CHANGE UP
SPECIMEN SOURCE: SIGNIFICANT CHANGE UP
SPECIMEN SOURCE: SIGNIFICANT CHANGE UP

## 2018-09-11 DIAGNOSIS — B37.0 CANDIDAL STOMATITIS: ICD-10-CM

## 2018-09-11 DIAGNOSIS — D70.2 OTHER DRUG-INDUCED AGRANULOCYTOSIS: ICD-10-CM

## 2018-09-11 DIAGNOSIS — D64.9 ANEMIA, UNSPECIFIED: ICD-10-CM

## 2018-09-11 DIAGNOSIS — F14.288: ICD-10-CM

## 2018-09-11 DIAGNOSIS — L03.818 CELLULITIS OF OTHER SITES: ICD-10-CM

## 2018-09-11 DIAGNOSIS — R50.81 FEVER PRESENTING WITH CONDITIONS CLASSIFIED ELSEWHERE: ICD-10-CM

## 2018-09-25 ENCOUNTER — INPATIENT (INPATIENT)
Facility: HOSPITAL | Age: 48
LOS: 1 days | Discharge: LEFT BEFORE DISCHARGE | End: 2018-09-27
Attending: INTERNAL MEDICINE | Admitting: INTERNAL MEDICINE

## 2018-09-25 VITALS
SYSTOLIC BLOOD PRESSURE: 130 MMHG | WEIGHT: 214.95 LBS | TEMPERATURE: 98 F | OXYGEN SATURATION: 97 % | HEIGHT: 75 IN | HEART RATE: 83 BPM | RESPIRATION RATE: 18 BRPM | DIASTOLIC BLOOD PRESSURE: 71 MMHG

## 2018-09-25 LAB
ALBUMIN SERPL ELPH-MCNC: 3.7 G/DL — SIGNIFICANT CHANGE UP (ref 3.5–5.2)
ALP SERPL-CCNC: 62 U/L — SIGNIFICANT CHANGE UP (ref 30–115)
ALT FLD-CCNC: 30 U/L — SIGNIFICANT CHANGE UP (ref 0–41)
ANION GAP SERPL CALC-SCNC: 14 MMOL/L — SIGNIFICANT CHANGE UP (ref 7–14)
AST SERPL-CCNC: 26 U/L — SIGNIFICANT CHANGE UP (ref 0–41)
BASOPHILS # BLD AUTO: 0.02 K/UL — SIGNIFICANT CHANGE UP (ref 0–0.2)
BASOPHILS NFR BLD AUTO: 1.3 % — HIGH (ref 0–1)
BILIRUB SERPL-MCNC: 0.5 MG/DL — SIGNIFICANT CHANGE UP (ref 0.2–1.2)
BUN SERPL-MCNC: 22 MG/DL — HIGH (ref 10–20)
CALCIUM SERPL-MCNC: 8.7 MG/DL — SIGNIFICANT CHANGE UP (ref 8.5–10.1)
CHLORIDE SERPL-SCNC: 97 MMOL/L — LOW (ref 98–110)
CO2 SERPL-SCNC: 26 MMOL/L — SIGNIFICANT CHANGE UP (ref 17–32)
CREAT SERPL-MCNC: 0.9 MG/DL — SIGNIFICANT CHANGE UP (ref 0.7–1.5)
EOSINOPHIL # BLD AUTO: 0.23 K/UL — SIGNIFICANT CHANGE UP (ref 0–0.7)
EOSINOPHIL NFR BLD AUTO: 15.1 % — HIGH (ref 0–8)
GLUCOSE SERPL-MCNC: 115 MG/DL — HIGH (ref 70–99)
HCT VFR BLD CALC: 35.3 % — LOW (ref 42–52)
HGB BLD-MCNC: 11.6 G/DL — LOW (ref 14–18)
IMM GRANULOCYTES NFR BLD AUTO: 0 % — LOW (ref 0.1–0.3)
LYMPHOCYTES # BLD AUTO: 0.95 K/UL — LOW (ref 1.2–3.4)
LYMPHOCYTES # BLD AUTO: 62.5 % — HIGH (ref 20.5–51.1)
MANUAL SMEAR VERIFICATION: SIGNIFICANT CHANGE UP
MCHC RBC-ENTMCNC: 27.2 PG — SIGNIFICANT CHANGE UP (ref 27–31)
MCHC RBC-ENTMCNC: 32.9 G/DL — SIGNIFICANT CHANGE UP (ref 32–37)
MCV RBC AUTO: 82.9 FL — SIGNIFICANT CHANGE UP (ref 80–94)
MONOCYTES # BLD AUTO: 0.3 K/UL — SIGNIFICANT CHANGE UP (ref 0.1–0.6)
MONOCYTES NFR BLD AUTO: 19.7 % — HIGH (ref 1.7–9.3)
NEUTROPHILS # BLD AUTO: 0.02 K/UL — LOW (ref 1.4–6.5)
NEUTROPHILS NFR BLD AUTO: 1.4 % — LOW (ref 42.2–75.2)
NRBC # BLD: 0 /100 WBCS — SIGNIFICANT CHANGE UP (ref 0–0)
NRBC # BLD: 0 /100 — SIGNIFICANT CHANGE UP (ref 0–0)
PLAT MORPH BLD: SIGNIFICANT CHANGE UP
PLATELET # BLD AUTO: 233 K/UL — SIGNIFICANT CHANGE UP (ref 130–400)
PLATELET CLUMP BLD QL SMEAR: SLIGHT
POTASSIUM SERPL-MCNC: 4.2 MMOL/L — SIGNIFICANT CHANGE UP (ref 3.5–5)
POTASSIUM SERPL-SCNC: 4.2 MMOL/L — SIGNIFICANT CHANGE UP (ref 3.5–5)
PROT SERPL-MCNC: 7.3 G/DL — SIGNIFICANT CHANGE UP (ref 6–8)
RBC # BLD: 4.26 M/UL — LOW (ref 4.7–6.1)
RBC # FLD: 14.9 % — HIGH (ref 11.5–14.5)
RBC BLD AUTO: NORMAL — SIGNIFICANT CHANGE UP
SODIUM SERPL-SCNC: 137 MMOL/L — SIGNIFICANT CHANGE UP (ref 135–146)
VARIANT LYMPHS # BLD: 6 % — HIGH (ref 0–5)
WBC # BLD: 1.52 K/UL — LOW (ref 4.8–10.8)
WBC # FLD AUTO: 1.52 K/UL — LOW (ref 4.8–10.8)

## 2018-09-25 RX ORDER — KETOROLAC TROMETHAMINE 30 MG/ML
15 SYRINGE (ML) INJECTION EVERY 12 HOURS
Qty: 0 | Refills: 0 | Status: DISCONTINUED | OUTPATIENT
Start: 2018-09-25 | End: 2018-09-26

## 2018-09-25 RX ORDER — IBUPROFEN 200 MG
600 TABLET ORAL EVERY 6 HOURS
Qty: 0 | Refills: 0 | Status: DISCONTINUED | OUTPATIENT
Start: 2018-09-25 | End: 2018-09-27

## 2018-09-25 RX ORDER — FILGRASTIM 480MCG/1.6
480 VIAL (ML) INJECTION DAILY
Qty: 0 | Refills: 0 | Status: DISCONTINUED | OUTPATIENT
Start: 2018-09-25 | End: 2018-09-27

## 2018-09-25 RX ORDER — IBUPROFEN 200 MG
600 TABLET ORAL ONCE
Qty: 0 | Refills: 0 | Status: COMPLETED | OUTPATIENT
Start: 2018-09-25 | End: 2018-09-25

## 2018-09-25 RX ORDER — ACETAMINOPHEN 500 MG
650 TABLET ORAL EVERY 6 HOURS
Qty: 0 | Refills: 0 | Status: DISCONTINUED | OUTPATIENT
Start: 2018-09-25 | End: 2018-09-27

## 2018-09-25 RX ORDER — HEPARIN SODIUM 5000 [USP'U]/ML
5000 INJECTION INTRAVENOUS; SUBCUTANEOUS EVERY 12 HOURS
Qty: 0 | Refills: 0 | Status: DISCONTINUED | OUTPATIENT
Start: 2018-09-25 | End: 2018-09-25

## 2018-09-25 RX ORDER — SODIUM CHLORIDE 9 MG/ML
1000 INJECTION INTRAMUSCULAR; INTRAVENOUS; SUBCUTANEOUS ONCE
Qty: 0 | Refills: 0 | Status: COMPLETED | OUTPATIENT
Start: 2018-09-25 | End: 2018-09-25

## 2018-09-25 RX ORDER — PANTOPRAZOLE SODIUM 20 MG/1
40 TABLET, DELAYED RELEASE ORAL
Qty: 0 | Refills: 0 | Status: DISCONTINUED | OUTPATIENT
Start: 2018-09-25 | End: 2018-09-27

## 2018-09-25 RX ORDER — DIPHENHYDRAMINE HYDROCHLORIDE AND LIDOCAINE HYDROCHLORIDE AND ALUMINUM HYDROXIDE AND MAGNESIUM HYDRO
10 KIT
Qty: 0 | Refills: 0 | Status: DISCONTINUED | OUTPATIENT
Start: 2018-09-25 | End: 2018-09-27

## 2018-09-25 RX ADMIN — Medication 600 MILLIGRAM(S): at 17:17

## 2018-09-25 RX ADMIN — SODIUM CHLORIDE 500 MILLILITER(S): 9 INJECTION INTRAMUSCULAR; INTRAVENOUS; SUBCUTANEOUS at 18:02

## 2018-09-25 RX ADMIN — DIPHENHYDRAMINE HYDROCHLORIDE AND LIDOCAINE HYDROCHLORIDE AND ALUMINUM HYDROXIDE AND MAGNESIUM HYDRO 10 MILLILITER(S): KIT at 17:57

## 2018-09-25 RX ADMIN — Medication 600 MILLIGRAM(S): at 18:00

## 2018-09-25 RX ADMIN — Medication 480 MICROGRAM(S): at 17:57

## 2018-09-25 NOTE — ED PROVIDER NOTE - MEDICAL DECISION MAKING DETAILS
Pt with low WBC, WBC count was nml on discharge, subjective fevers at home + mouth ulcers.  ROSANA Kruse spoke with Heme/onc.  Plan for admission

## 2018-09-25 NOTE — H&P ADULT - HISTORY OF PRESENT ILLNESS
This is a 48 year old male with hx of cocaine use resulting in neutropenia who presents with subjective fevers and mouth sores x 4 days after using cocaine a week ago. Pt broke up with his girlfriend which triggered him to use cocaine again. He states he develops neutropenia every time he uses cocaine.   Pt was admitted to the hospital 09/01 for neutropenic fever. He was seen by hematology and ID, given vancomycin, zosyn, acyclovir and diflucan and was d/c with bactrim and fluconazole. Pt sts he had hx of neutropenia many years ago and had a negative bone marrow bx in 2012. Pt refused bone marrow bx as an inpatient. + subjective fevers/chills at home. No chest pain, sough, sob, rhinorrhea, ear pain, n/v, abdominal pain.

## 2018-09-25 NOTE — ED ADULT NURSE NOTE - NSIMPLEMENTINTERV_GEN_ALL_ED
Implemented All Universal Safety Interventions:  [employer] to call system. Call bell, personal items and telephone within reach. Instruct patient to call for assistance. Room bathroom lighting operational. Non-slip footwear when patient is off stretcher. Physically safe environment: no spills, clutter or unnecessary equipment. Stretcher in lowest position, wheels locked, appropriate side rails in place.

## 2018-09-25 NOTE — H&P ADULT - NSHPPHYSICALEXAM_GEN_ALL_CORE
PHYSICAL EXAM:  GENERAL: NAD, well-developed  HEAD:  Atraumatic, Normocephalic  EYES: EOMI, PERRLA, conjunctiva and sclera clear  Oral: oral ulcers   NECK: Supple, No JVD, tender submandibular lymphadenopathy   CHEST/LUNG: Clear to auscultation bilaterally; No wheeze  HEART: Regular rate and rhythm; No murmurs, rubs, or gallops  ABDOMEN: Soft, Nontender, Nondistended; Bowel sounds present  EXTREMITIES:  2+ Peripheral Pulses, No clubbing, cyanosis, or edema  PSYCH: AAOx3  NEUROLOGY: non-focal  SKIN: No rashes or lesions

## 2018-09-25 NOTE — ED PROVIDER NOTE - PHYSICAL EXAMINATION
CONSTITUTIONAL: Well-appearing; well-nourished; in no apparent distress.   EYES: PERRL; EOM intact.   ENT: + multiple small ulcers seen in buccal mucosa. Non erythematous pharynx. No exudates. + tender anterior cervical lymphadenopathy  CARDIOVASCULAR: Normal S1, S2; no murmurs, rubs, or gallops.   RESPIRATORY: Normal chest excursion with respiration; breath sounds clear and equal bilaterally; no wheezes, rhonchi, or rales.  GI/: Normal bowel sounds; non-distended; non-tender; no palpable organomegaly.   MS: No evidence of trauma or deformity.  Normal ROM in all four extremities; non-tender to palpation  SKIN: Normal for age and race; warm; dry; good turgor; no apparent lesions or exudate.   NEURO/PSYCH: A & O x 4; grossly unremarkable. mood and manner are appropriate.

## 2018-09-25 NOTE — ED PROVIDER NOTE - NS ED ROS FT
Constitutional: no fever, chills, no recent weight loss, change in appetite or malaise  ENT: + sores inside mouth and swollen lymph nodes  Cardiac: No chest pain, SOB or edema.  Respiratory: No cough or respiratory distress  GI: No nausea, vomiting, diarrhea or abdominal pain.  MS: no pain to back or extremities, no loss of ROM, no weakness  Neuro: No headache or weakness. No LOC.  Skin: No skin rash.  Except as documented in the HPI, all other systems are negative.

## 2018-09-25 NOTE — H&P ADULT - NSHPLABSRESULTS_GEN_ALL_CORE
CBC Full  -  ( 25 Sep 2018 14:50 )  WBC Count : 1.52 K/uL  Hemoglobin : 11.6 g/dL  Hematocrit : 35.3 %  Platelet Count - Automated : 233 K/uL  Mean Cell Volume : 82.9 fL  Mean Cell Hemoglobin : 27.2 pg  Mean Cell Hemoglobin Concentration : 32.9 g/dL  Auto Neutrophil # : 0.02 K/uL  Auto Lymphocyte # : 0.95 K/uL  Auto Monocyte # : 0.30 K/uL  Auto Eosinophil # : 0.23 K/uL  Auto Basophil # : 0.02 K/uL  Auto Neutrophil % : 1.4 %  Auto Lymphocyte % : 62.5 %  Auto Monocyte % : 19.7 %  Auto Eosinophil % : 15.1 %  Auto Basophil % : 1.3 %    BMP:    09-25 @ 14:50    Blood Urea Nitrogen - 22  Calcium - 8.7  Carbon Dioxide - 26  Chloride - 97  Creatinine - 0.9  Glucose - 115  Potassium - 4.2  Sodium - 137

## 2018-09-25 NOTE — ED ADULT NURSE REASSESSMENT NOTE - NS ED NURSE REASSESS COMMENT FT1
Pt refuse HIV testing, pt a/o x 4, pt denies IV drug abuse, pt states "I did cocaine a while ago". Pt denies wanting rehab, pt denies wanting to harm self or others.

## 2018-09-25 NOTE — ED PROVIDER NOTE - PROGRESS NOTE DETAILS
Discussed case with Hematology Dr. Abbasi. Will admit pt with hem following. Discussed case with marysol aware of pts admission

## 2018-09-25 NOTE — H&P ADULT - ASSESSMENT
This is a 48 year old male with hx of cocaine use resulting in neutropenia presents with fevers and mouth sores after using cocaine a week ago. Pt broke up with his girlfriend which triggered him to use cocaine again. He states he develops neutropenia every time he uses cocaine.   Pt was admitted to the hospital 09/01 for neutropenic fever. He was seen by hematology and ID, given vancomycin, zosyn, acyclovir and diflucan and was d/c with bactrim and fluconazole. Pt sts he had hx of neutropenia many years ago and had a negative bone marrow bx in 2012. Pt refused bone marrow bx as an inpatient. + subjective fevers/chills at home. No chest pain, sough, sob, rhinorrhea, ear pain, n/v, abdominal pain.       Problem List:  #Severe Neutropenia   #Mucositis   #Cocaine abuse   #Chronic Anemia     Plan:  - Neupogen  - Magic mouthwash with oral care   - Panculture and start vanc/cefepime if patient spikes a fever   - hematology consult   - NSAIDs for pain   - dvt ppx

## 2018-09-25 NOTE — ED PROVIDER NOTE - ATTENDING CONTRIBUTION TO CARE
49 yo M h/o cocaine abuse and  neutropenia presents with c/o mouth pain and sores x 1 week as well as swollen glands and subjective fevers at home.  Pt states that in the past he has had neutropenia after using cocaine.  Pt states that in 2012 the work up was negative,  Pt was admitted 2 weeks go after using cocaine again.  Treated for neutropenic  fever with abx, Acyclovir, was discharged on Bactrim.  no n/v.  On exam pt in NAD AAO x 3, OP with ulcers to mucosa, + lad to ant cervical chain and submandibular, Lungs CAT B/L no wrr

## 2018-09-25 NOTE — ED PROVIDER NOTE - OBJECTIVE STATEMENT
48 year old male with hx of cocaine use and neutropenic fever c/o swollen neck glands and sores on mouth x 1 week. Pt was admitted to the hospital 09/01 for neutropenic fever. He was seen by hematology and ID, given vancomycin, zosyn, acyclovir and diflucan and was d/c with bactrim and fluconazole. Pt sts he had hx of neutropenia many years ago and had a negative bone marrow bx in 2012. Pt refused bone marrow bx as an inpatient. + subjective fevers/chills. No chest pain, sough, sob, rhinorrhea, ear pain, n/v, abdominal pain. 48 year old male with hx of cocaine use and neutropenic fever c/o swollen neck glands and sores on mouth x 1 week. Pt was admitted to the hospital 09/01 for neutropenic fever. He was seen by hematology and ID, given vancomycin, zosyn, acyclovir and diflucan and was d/c with bactrim and fluconazole. Pt sts he had hx of neutropenia many years ago and had a negative bone marrow bx in 2012. Pt refused bone marrow bx as an inpatient. + subjective fevers/chills at home. No chest pain, sough, sob, rhinorrhea, ear pain, n/v, abdominal pain.

## 2018-09-26 LAB
ANION GAP SERPL CALC-SCNC: 7 MMOL/L — SIGNIFICANT CHANGE UP (ref 7–14)
BASOPHILS # BLD AUTO: 0.02 K/UL — SIGNIFICANT CHANGE UP (ref 0–0.2)
BASOPHILS NFR BLD AUTO: 1.4 % — HIGH (ref 0–1)
BUN SERPL-MCNC: 14 MG/DL — SIGNIFICANT CHANGE UP (ref 10–20)
CALCIUM SERPL-MCNC: 8.3 MG/DL — LOW (ref 8.5–10.1)
CHLORIDE SERPL-SCNC: 104 MMOL/L — SIGNIFICANT CHANGE UP (ref 98–110)
CO2 SERPL-SCNC: 27 MMOL/L — SIGNIFICANT CHANGE UP (ref 17–32)
CREAT SERPL-MCNC: 0.8 MG/DL — SIGNIFICANT CHANGE UP (ref 0.7–1.5)
EOSINOPHIL # BLD AUTO: 0.28 K/UL — SIGNIFICANT CHANGE UP (ref 0–0.7)
EOSINOPHIL NFR BLD AUTO: 19.3 % — HIGH (ref 0–8)
GLUCOSE SERPL-MCNC: 108 MG/DL — HIGH (ref 70–99)
HCT VFR BLD CALC: 32.9 % — LOW (ref 42–52)
HGB BLD-MCNC: 10.6 G/DL — LOW (ref 14–18)
IMM GRANULOCYTES NFR BLD AUTO: 0 % — LOW (ref 0.1–0.3)
LYMPHOCYTES # BLD AUTO: 0.8 K/UL — LOW (ref 1.2–3.4)
LYMPHOCYTES # BLD AUTO: 55.2 % — HIGH (ref 20.5–51.1)
MCHC RBC-ENTMCNC: 27 PG — SIGNIFICANT CHANGE UP (ref 27–31)
MCHC RBC-ENTMCNC: 32.2 G/DL — SIGNIFICANT CHANGE UP (ref 32–37)
MCV RBC AUTO: 83.7 FL — SIGNIFICANT CHANGE UP (ref 80–94)
MONOCYTES # BLD AUTO: 0.32 K/UL — SIGNIFICANT CHANGE UP (ref 0.1–0.6)
MONOCYTES NFR BLD AUTO: 22.1 % — HIGH (ref 1.7–9.3)
NEUTROPHILS # BLD AUTO: 0.03 K/UL — LOW (ref 1.4–6.5)
NEUTROPHILS NFR BLD AUTO: 2 % — LOW (ref 42.2–75.2)
NRBC # BLD: 0 /100 WBCS — SIGNIFICANT CHANGE UP (ref 0–0)
PLATELET # BLD AUTO: 193 K/UL — SIGNIFICANT CHANGE UP (ref 130–400)
POTASSIUM SERPL-MCNC: 4.8 MMOL/L — SIGNIFICANT CHANGE UP (ref 3.5–5)
POTASSIUM SERPL-SCNC: 4.8 MMOL/L — SIGNIFICANT CHANGE UP (ref 3.5–5)
RBC # BLD: 3.93 M/UL — LOW (ref 4.7–6.1)
RBC # FLD: 15.1 % — HIGH (ref 11.5–14.5)
SODIUM SERPL-SCNC: 138 MMOL/L — SIGNIFICANT CHANGE UP (ref 135–146)
WBC # BLD: 1.45 K/UL — LOW (ref 4.8–10.8)
WBC # FLD AUTO: 1.45 K/UL — LOW (ref 4.8–10.8)

## 2018-09-26 RX ORDER — VANCOMYCIN HCL 1 G
VIAL (EA) INTRAVENOUS
Qty: 0 | Refills: 0 | Status: DISCONTINUED | OUTPATIENT
Start: 2018-09-26 | End: 2018-09-27

## 2018-09-26 RX ORDER — VANCOMYCIN HCL 1 G
1000 VIAL (EA) INTRAVENOUS ONCE
Qty: 0 | Refills: 0 | Status: COMPLETED | OUTPATIENT
Start: 2018-09-26 | End: 2018-09-26

## 2018-09-26 RX ORDER — CEFEPIME 1 G/1
2000 INJECTION, POWDER, FOR SOLUTION INTRAMUSCULAR; INTRAVENOUS ONCE
Qty: 0 | Refills: 0 | Status: COMPLETED | OUTPATIENT
Start: 2018-09-26 | End: 2018-09-26

## 2018-09-26 RX ORDER — CEFEPIME 1 G/1
INJECTION, POWDER, FOR SOLUTION INTRAMUSCULAR; INTRAVENOUS
Qty: 0 | Refills: 0 | Status: DISCONTINUED | OUTPATIENT
Start: 2018-09-26 | End: 2018-09-27

## 2018-09-26 RX ORDER — HEPARIN SODIUM 5000 [USP'U]/ML
5000 INJECTION INTRAVENOUS; SUBCUTANEOUS EVERY 12 HOURS
Qty: 0 | Refills: 0 | Status: DISCONTINUED | OUTPATIENT
Start: 2018-09-26 | End: 2018-09-27

## 2018-09-26 RX ORDER — CEFEPIME 1 G/1
2000 INJECTION, POWDER, FOR SOLUTION INTRAMUSCULAR; INTRAVENOUS EVERY 8 HOURS
Qty: 0 | Refills: 0 | Status: DISCONTINUED | OUTPATIENT
Start: 2018-09-26 | End: 2018-09-27

## 2018-09-26 RX ORDER — VANCOMYCIN HCL 1 G
1000 VIAL (EA) INTRAVENOUS EVERY 12 HOURS
Qty: 0 | Refills: 0 | Status: DISCONTINUED | OUTPATIENT
Start: 2018-09-26 | End: 2018-09-27

## 2018-09-26 RX ADMIN — DIPHENHYDRAMINE HYDROCHLORIDE AND LIDOCAINE HYDROCHLORIDE AND ALUMINUM HYDROXIDE AND MAGNESIUM HYDRO 10 MILLILITER(S): KIT at 06:46

## 2018-09-26 RX ADMIN — Medication 600 MILLIGRAM(S): at 10:10

## 2018-09-26 RX ADMIN — Medication 600 MILLIGRAM(S): at 22:46

## 2018-09-26 RX ADMIN — Medication 600 MILLIGRAM(S): at 03:46

## 2018-09-26 RX ADMIN — Medication 250 MILLIGRAM(S): at 12:36

## 2018-09-26 RX ADMIN — DIPHENHYDRAMINE HYDROCHLORIDE AND LIDOCAINE HYDROCHLORIDE AND ALUMINUM HYDROXIDE AND MAGNESIUM HYDRO 10 MILLILITER(S): KIT at 17:55

## 2018-09-26 RX ADMIN — Medication 600 MILLIGRAM(S): at 03:38

## 2018-09-26 RX ADMIN — DIPHENHYDRAMINE HYDROCHLORIDE AND LIDOCAINE HYDROCHLORIDE AND ALUMINUM HYDROXIDE AND MAGNESIUM HYDRO 10 MILLILITER(S): KIT at 12:02

## 2018-09-26 RX ADMIN — Medication 600 MILLIGRAM(S): at 16:22

## 2018-09-26 RX ADMIN — Medication 250 MILLIGRAM(S): at 17:54

## 2018-09-26 RX ADMIN — PANTOPRAZOLE SODIUM 40 MILLIGRAM(S): 20 TABLET, DELAYED RELEASE ORAL at 06:41

## 2018-09-26 RX ADMIN — CEFEPIME 100 MILLIGRAM(S): 1 INJECTION, POWDER, FOR SOLUTION INTRAMUSCULAR; INTRAVENOUS at 11:58

## 2018-09-26 RX ADMIN — Medication 600 MILLIGRAM(S): at 09:41

## 2018-09-26 RX ADMIN — DIPHENHYDRAMINE HYDROCHLORIDE AND LIDOCAINE HYDROCHLORIDE AND ALUMINUM HYDROXIDE AND MAGNESIUM HYDRO 10 MILLILITER(S): KIT at 23:00

## 2018-09-26 RX ADMIN — HEPARIN SODIUM 5000 UNIT(S): 5000 INJECTION INTRAVENOUS; SUBCUTANEOUS at 17:56

## 2018-09-26 RX ADMIN — CEFEPIME 100 MILLIGRAM(S): 1 INJECTION, POWDER, FOR SOLUTION INTRAMUSCULAR; INTRAVENOUS at 21:12

## 2018-09-26 RX ADMIN — Medication 600 MILLIGRAM(S): at 15:51

## 2018-09-26 RX ADMIN — Medication 480 MICROGRAM(S): at 12:01

## 2018-09-26 NOTE — PROGRESS NOTE ADULT - SUBJECTIVE AND OBJECTIVE BOX
CC: f/u neutropenia      ROS:      VITALS:        PHYSICAL EXAM:  GENERAL: NAD, well-groomed, well-developed  HEAD = atraumatic, normoocehalic  EYES: conjunctiva and sclera clear  NECK: Supple, No JVD  NERVOUS SYSTEM:  Alert & Oriented X3, Good concentration  CHEST/LUNG: Clear to ausculatation bilaterally; No rales, rhonchi, wheezing, or rubs  HEART: Regular rate and rhythm; No murmurs, rubs, or gallops  ABDOMEN: Soft, Nontender, Nondistended; Bowel sounds present  EXTREMITIES:  BL No clubbing, cyanosis, or edema        DATA:                          10.6   1.45  )-----------( 193      ( 26 Sep 2018 09:23 )             32.9     09-26    138  |  104  |  14  ----------------------------<  108<H>  4.8   |  27  |  0.8    Ca    8.3<L>      26 Sep 2018 09:23    TPro  7.3  /  Alb  3.7  /  TBili  0.5  /  DBili  x   /  AST  26  /  ALT  30  /  AlkPhos  62  09-25 CC: f/u neutropenia      ROS:      Vital Signs Last 24 Hrs  T(C): 37.1 (26 Sep 2018 05:15), Max: 37.3 (25 Sep 2018 17:10)  T(F): 98.8 (26 Sep 2018 05:15), Max: 99.2 (25 Sep 2018 17:10)  HR: 75 (26 Sep 2018 05:15) (75 - 86)  BP: 115/68 (26 Sep 2018 05:15) (104/55 - 130/81)  BP(mean): 86 (25 Sep 2018 17:10) (86 - 106)  RR: 16 (26 Sep 2018 05:15) (16 - 19)  SpO2: 99% (25 Sep 2018 17:10) (97% - 99%)      PHYSICAL EXAM:  GENERAL: NAD, well-groomed, well-developed  HEAD = atraumatic, normoocehalic  EYES: conjunctiva and sclera clear  NECK: Supple, No JVD  NERVOUS SYSTEM:  Alert & Oriented X3, Good concentration  CHEST/LUNG: Clear to ausculatation bilaterally; No rales, rhonchi, wheezing, or rubs  HEART: Regular rate and rhythm; No murmurs, rubs, or gallops  ABDOMEN: Soft, Nontender, Nondistended; Bowel sounds present  EXTREMITIES:  BL No clubbing, cyanosis, or edema        DATA:                          10.6   1.45  )-----------( 193      ( 26 Sep 2018 09:23 )             32.9     09-26    138  |  104  |  14  ----------------------------<  108<H>  4.8   |  27  |  0.8    Ca    8.3<L>      26 Sep 2018 09:23    TPro  7.3  /  Alb  3.7  /  TBili  0.5  /  DBili  x   /  AST  26  /  ALT  30  /  AlkPhos  62  09-25      CXR: No radiographic evidence of acute cardiopulmonary disease. CC: f/u neutropenia      ROS:  no fever or chills  wants to leave AAP  advised not ready  has lip swelling  thigh stil with some redness from the prior time        Vital Signs Last 24 Hrs  T(C): 37.1 (26 Sep 2018 05:15), Max: 37.3 (25 Sep 2018 17:10)  T(F): 98.8 (26 Sep 2018 05:15), Max: 99.2 (25 Sep 2018 17:10)  HR: 75 (26 Sep 2018 05:15) (75 - 86)  BP: 115/68 (26 Sep 2018 05:15) (104/55 - 130/81)  BP(mean): 86 (25 Sep 2018 17:10) (86 - 106)  RR: 16 (26 Sep 2018 05:15) (16 - 19)  SpO2: 99% (25 Sep 2018 17:10) (97% - 99%)      PHYSICAL EXAM:  GENERAL: NAD, well-groomed, well-developed  SKI: there is lip swelling lower lip on the right side. there is residual area of induation over the left thigh with no fluctuance  NECK: Supple, No JVD  NERVOUS SYSTEM:  Alert & Oriented X3, Good concentration  CHEST/LUNG: Clear to ausculatation bilaterally; No rales, rhonchi, wheezing, or rubs  HEART: Regular rate and rhythm; No murmurs, rubs, or gallops  ABDOMEN: Soft, Nontender, Nondistended; Bowel sounds present  EXTREMITIES:  BL No clubbing, cyanosis, or edema. BLE        DATA:                          10.6   1.45  )-----------( 193      ( 26 Sep 2018 09:23 )             32.9     09-26    138  |  104  |  14  ----------------------------<  108<H>  4.8   |  27  |  0.8    Ca    8.3<L>      26 Sep 2018 09:23    TPro  7.3  /  Alb  3.7  /  TBili  0.5  /  DBili  x   /  AST  26  /  ALT  30  /  AlkPhos  62  09-25      CXR: No radiographic evidence of acute cardiopulmonary disease.

## 2018-09-26 NOTE — PROGRESS NOTE ADULT - ASSESSMENT
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 He has a prior hopsital admission 1 month ago where per report he was seen by hematology and ID, given vancomycin, zosyn, acyclovir and diflucan and was d/c with bactrim and fluconazole. Pt sts he had hx of neutropenia many years ago and had a negative bone marrow bx in 2012. Pt refused bone marrow bx as an inpatient on prior admision      # Severe Neutropenia: without fever, but with hx of fevers at home  - start cefepime 2g IV q8, and vanco 1g IV q12  - ID consult  - heme consult. he declined a bone marroy bx in the past but reasonable to have him meet with hematology again  - frequent vitals  - patient was ordered for neupogen, will however need to get heme input on potential continuation of this med. recieved a dose yesterday  - obtain Bcx, UA, UCx, CXR  - check differential on CBC qday      # Mucositis   ---- Magic mouthwash with oral care       #### Cocaine abuse   - addiction medicine consult      # Coordination of care:  - VTE ppx with   - plan of care reviewed with patient and RN 47 yo male with PMH that includes Alcohol use, Illicit drug use , neutropenia by hx cocaine induced, who presents with subjective fevers and mouth sores x 4 days in duration after using cocaine a week ago. He has a prior hopsital admission 1 month ago where per report he was seen by hematology and ID, given vancomycin, zosyn, acyclovir and diflucan and was d/c with bactrim and fluconazole. Pt sts he had hx of neutropenia many years ago and had a negative bone marrow bx in 2012. Pt refused bone marrow bx as an inpatient on prior admision      # Severe Neutropenia: without fever, but with hx of fevers at home  - start cefepime 2g IV q8, and vanco 1g IV q12  - ID consult  - heme consult. he declined a bone marroy bx in the past but reasonable to have him meet with hematology again  - frequent vitals  - patient was ordered for neupogen, possibly at the recommendation of heme?, will however need to get heme input on potential continuation of this med.   - obtain Bcx, UA, UCx  - check differential on CBC qday      # Mucositis   ---- Magic mouthwash with oral care       #### Cocaine abuse   - addiction medicine consult      # Coordination of care:  - VVTE ppx with HSQ  - plan of care reviewed with patient and RN 47 yo male with PMH that includes Alcohol use, Illicit drug use , neutropenia by hx cocaine induced, who presents with subjective fevers and mouth sores x 4 days in duration after using cocaine a week ago. He has a prior hopsital admission 1 month ago where per report he was seen by hematology and ID, given vancomycin, zosyn, acyclovir and diflucan and was d/c with bactrim and fluconazole. Pt sts he had hx of neutropenia many years ago and had a negative bone marrow bx in 2012. Pt refused bone marrow bx as an inpatient on prior admision      # Severe Neutropenia: without fever, but with hx of fevers at home  - start cefepime 2g IV q8, and vanco 1g IV q12  - ID consult  - heme consult. he declined a bone marroy bx in the past but reasonable to have him meet with hematology again  - frequent vitals  - patient was ordered for neupogen, possibly at the recommendation of heme?, will however need to get heme input on potential continuation of this med.   - obtain Bcx, UA, UCx  - check differential on CBC qday      # Mucositis   - Magic mouthwash with oral care   - dental cs      # Cocaine abuse: reprots episodic. has declined rehab referral      # Coordination of care:  - VVTE ppx with HSQ  - with regards to his wish to leave asap. 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 patient counseld and fully aware of RBA of leaving AMA and demostrated capacity to make this decision. he is willing to stay for now  - plan of care reviewed with patient and RN

## 2018-09-27 ENCOUNTER — EMERGENCY (EMERGENCY)
Facility: HOSPITAL | Age: 48
LOS: 0 days | Discharge: LEFT BEFORE DISCHARGE | End: 2018-09-27
Attending: EMERGENCY MEDICINE

## 2018-09-27 VITALS
DIASTOLIC BLOOD PRESSURE: 60 MMHG | HEART RATE: 59 BPM | SYSTOLIC BLOOD PRESSURE: 116 MMHG | RESPIRATION RATE: 18 BRPM | TEMPERATURE: 97 F

## 2018-09-27 VITALS
DIASTOLIC BLOOD PRESSURE: 59 MMHG | RESPIRATION RATE: 16 BRPM | HEART RATE: 77 BPM | TEMPERATURE: 99 F | SYSTOLIC BLOOD PRESSURE: 113 MMHG

## 2018-09-27 DIAGNOSIS — T40.5X5A ADVERSE EFFECT OF COCAINE, INITIAL ENCOUNTER: ICD-10-CM

## 2018-09-27 LAB
ALBUMIN SERPL ELPH-MCNC: 3.8 G/DL — SIGNIFICANT CHANGE UP (ref 3.5–5.2)
ALP SERPL-CCNC: 65 U/L — SIGNIFICANT CHANGE UP (ref 30–115)
ALT FLD-CCNC: 30 U/L — SIGNIFICANT CHANGE UP (ref 0–41)
ANION GAP SERPL CALC-SCNC: 11 MMOL/L — SIGNIFICANT CHANGE UP (ref 7–14)
AST SERPL-CCNC: 20 U/L — SIGNIFICANT CHANGE UP (ref 0–41)
BASOPHILS # BLD AUTO: 0 K/UL — SIGNIFICANT CHANGE UP (ref 0–0.2)
BASOPHILS NFR BLD AUTO: 0 % — SIGNIFICANT CHANGE UP (ref 0–1)
BILIRUB SERPL-MCNC: 0.5 MG/DL — SIGNIFICANT CHANGE UP (ref 0.2–1.2)
BUN SERPL-MCNC: 19 MG/DL — SIGNIFICANT CHANGE UP (ref 10–20)
CALCIUM SERPL-MCNC: 9 MG/DL — SIGNIFICANT CHANGE UP (ref 8.5–10.1)
CHLORIDE SERPL-SCNC: 99 MMOL/L — SIGNIFICANT CHANGE UP (ref 98–110)
CO2 SERPL-SCNC: 26 MMOL/L — SIGNIFICANT CHANGE UP (ref 17–32)
CREAT SERPL-MCNC: 1 MG/DL — SIGNIFICANT CHANGE UP (ref 0.7–1.5)
EOSINOPHIL # BLD AUTO: 0.03 K/UL — SIGNIFICANT CHANGE UP (ref 0–0.7)
EOSINOPHIL NFR BLD AUTO: 2 % — SIGNIFICANT CHANGE UP (ref 0–8)
GLUCOSE SERPL-MCNC: 102 MG/DL — HIGH (ref 70–99)
HCT VFR BLD CALC: 35.1 % — LOW (ref 42–52)
HGB BLD-MCNC: 11.3 G/DL — LOW (ref 14–18)
LYMPHOCYTES # BLD AUTO: 0.84 K/UL — LOW (ref 1.2–3.4)
LYMPHOCYTES # BLD AUTO: 63 % — HIGH (ref 20.5–51.1)
MCHC RBC-ENTMCNC: 26.9 PG — LOW (ref 27–31)
MCHC RBC-ENTMCNC: 32.2 G/DL — SIGNIFICANT CHANGE UP (ref 32–37)
MCV RBC AUTO: 83.6 FL — SIGNIFICANT CHANGE UP (ref 80–94)
MONOCYTES # BLD AUTO: 0.11 K/UL — SIGNIFICANT CHANGE UP (ref 0.1–0.6)
MONOCYTES NFR BLD AUTO: 8 % — SIGNIFICANT CHANGE UP (ref 1.7–9.3)
NEUTROPHILS # BLD AUTO: 0.36 K/UL — LOW (ref 1.4–6.5)
NEUTROPHILS NFR BLD AUTO: 27 % — LOW (ref 42.2–75.2)
NRBC # BLD: 0 /100 — SIGNIFICANT CHANGE UP (ref 0–0)
NRBC # BLD: SIGNIFICANT CHANGE UP /100 WBCS (ref 0–0)
PLAT MORPH BLD: NORMAL — SIGNIFICANT CHANGE UP
PLATELET # BLD AUTO: 254 K/UL — SIGNIFICANT CHANGE UP (ref 130–400)
PLATELET COUNT - ESTIMATE: NORMAL — SIGNIFICANT CHANGE UP
POTASSIUM SERPL-MCNC: 4.6 MMOL/L — SIGNIFICANT CHANGE UP (ref 3.5–5)
POTASSIUM SERPL-SCNC: 4.6 MMOL/L — SIGNIFICANT CHANGE UP (ref 3.5–5)
PROT SERPL-MCNC: 7.1 G/DL — SIGNIFICANT CHANGE UP (ref 6–8)
RBC # BLD: 4.2 M/UL — LOW (ref 4.7–6.1)
RBC # FLD: 15.3 % — HIGH (ref 11.5–14.5)
RBC BLD AUTO: NORMAL — SIGNIFICANT CHANGE UP
SODIUM SERPL-SCNC: 136 MMOL/L — SIGNIFICANT CHANGE UP (ref 135–146)
WBC # BLD: 1.34 K/UL — LOW (ref 4.8–10.8)
WBC # FLD AUTO: 1.34 K/UL — LOW (ref 4.8–10.8)

## 2018-09-27 RX ORDER — FLUCONAZOLE 150 MG/1
200 TABLET ORAL DAILY
Qty: 0 | Refills: 0 | Status: DISCONTINUED | OUTPATIENT
Start: 2018-09-27 | End: 2018-09-27

## 2018-09-27 RX ORDER — CIPROFLOXACIN LACTATE 400MG/40ML
500 VIAL (ML) INTRAVENOUS EVERY 12 HOURS
Qty: 0 | Refills: 0 | Status: DISCONTINUED | OUTPATIENT
Start: 2018-09-27 | End: 2018-09-27

## 2018-09-27 RX ADMIN — Medication 480 MICROGRAM(S): at 07:30

## 2018-09-27 RX ADMIN — CEFEPIME 100 MILLIGRAM(S): 1 INJECTION, POWDER, FOR SOLUTION INTRAMUSCULAR; INTRAVENOUS at 05:44

## 2018-09-27 RX ADMIN — DIPHENHYDRAMINE HYDROCHLORIDE AND LIDOCAINE HYDROCHLORIDE AND ALUMINUM HYDROXIDE AND MAGNESIUM HYDRO 10 MILLILITER(S): KIT at 05:43

## 2018-09-27 RX ADMIN — Medication 250 MILLIGRAM(S): at 05:43

## 2018-09-27 NOTE — CHART NOTE - NSCHARTNOTEFT_GEN_A_CORE
appears from EMR patient left AMA prior to am shift  unknown if received rx prior to leaving  call placed to yaneth # no pickup and no answering machine  will reattempt later on

## 2018-09-27 NOTE — CONSULT NOTE ADULT - PROBLEM SELECTOR RECOMMENDATION 9
cocaine induced neutropenia    DC  PO Cipro  PO augmentin  PO Diflucan    7 days  SC Neupogen out pt   recall if needed

## 2018-09-27 NOTE — ED PROVIDER NOTE - MEDICAL DECISION MAKING DETAILS
48 y.o. male, PMH of Alcohol use, Illicit drug use, neutropenia by hx cocaine induced, signed out AMA from the hospital today, where he was admitted for subjective fevers and mouth sores. Pt came in for abx and reevaluation. Labs sent, hospitalist called for readmission. Pt eloped from the ED during work up.

## 2018-09-27 NOTE — CONSULT NOTE ADULT - SUBJECTIVE AND OBJECTIVE BOX
ADRIANNA GARZA 48yMalePatient is a 48y old  Male who presents with a chief complaint of neutropenia (26 Sep 2018 10:15)      Patient has history of:  No Known Allergies      PMH - cocaine induced neutropenia     FSH - Not relevant    ROS - not contributory    Patient treated with:  amoxicillin  875 milliGRAM(s)/clavulanate 1 Tablet(s) Oral two times a day  ciprofloxacin     Tablet 500 milliGRAM(s) Oral every 12 hours  fluconAZOLE   Tablet 200 milliGRAM(s) Oral daily    PHYSICAL EXAM  T(F): 99 (09-27-18 @ 05:15), Max: 99.7 (09-26-18 @ 14:22)  HR: 77 (09-27-18 @ 05:15) (77 - 83)  BP: 113/59 (09-27-18 @ 05:15) (108/65 - 121/76)  RR: 16 (09-27-18 @ 05:15) (16 - 16)  SpO2: --  Daily     Daily   HEENT: normal, no nuchal rigidity. oral thrush   Cor: RSR Nl S1 S2  Lungs: clear  Decreased breath sounds at bases  Abdomen: Nontender, Nl BS,   Ext: No phlebitis     LAB & RADIOLOGIC RESULTS:                        10.6   1.45  )-----------( 193      ( 26 Sep 2018 09:23 )             32.9         09-26    138  |  104  |  14  ----------------------------<  108<H>  4.8   |  27  |  0.8    Ca    8.3<L>      26 Sep 2018 09:23    TPro  7.3  /  Alb  3.7  /  TBili  0.5  /  DBili  x   /  AST  26  /  ALT  30  /  AlkPhos  62  09-25    Sodium, Serum: 138 mmol/L (09-26-18 @ 09:23)     Creatinine, Serum: 0.8 mg/dL (09-26-18 @ 09:23)  eGFR if Non : 107 mL/min/1.73M2 (09-26-18 @ 09:23)  eGFR if : 124 mL/min/1.73M2 (09-26-18 @ 09:23)    LIVER FUNCTIONS - ( 25 Sep 2018 14:50 )  Alb: 3.7 g/dL / Pro: 7.3 g/dL / ALK PHOS: 62 U/L / ALT: 30 U/L / AST: 26 U/L / GGT: x           Progress Note Adult-Hospitalist Attending [ALFONSO Mcfadden] (09-26-18 @ 10:15)  Patient Profile Adult [HARRY Santos ANGELO Acuna] (09-25-18 @ 18:33)  Outpatient Clinical Summary - Medical  Group [O. Provider] (09-25-18 @ 18:23)  H&P Adult [MARISELA Chavez] (09-25-18 @ 17:12)  ED ADULT Nurse Reassessment Note [MARISELA Soria] (09-25-18 @ 15:53)  Outpatient Clinical Summary - Medical  Group [O. Provider] (09-25-18 @ 15:38)  ED Provider Note [RADHA Esparza] (09-25-18 @ 15:21)  ED ADULT Nurse Note [MARISELA Youssef] (09-25-18 @ 13:56)  ED ADULT Triage Note [GERARDO Lawton] (09-25-18 @ 13:54)  Discharge Note Adult [ENZO Luong] (09-06-18 @ 09:46)  Progress Note Adult-Infectious Disease Attending [AUSTIN Bird] (09-06-18 @ 08:01)  Care Coordination Assessment [C. Provider] (09-05-18 @ 14:07)  Progress Note Adult-Hospitalist Attending [ANGELO Beckham] (09-05-18 @ 13:06)  Progress Note Adult-Hospitalist Attending [ANGELO Beckham] (09-04-18 @ 10:46)  Progress Note Adult-Infectious Disease Attending [AUSTIN Bird] (09-04-18 @ 07:54)  Progress Note Adult-Hospitalist Attending [ANGELO Beckham] (09-03-18 @ 12:30)  Provider Contact Note (Other) [GERARDO Parham] (09-02-18 @ 16:56)  Provider Contact Note (Other) [GERARDO Parham] (09-02-18 @ 16:49)  Consult Note Adult-Infectious Disease Attending [LOVE Dawson] (09-02-18 @ 12:24)  Patient Profile Adult [HARRY Henrique] (09-02-18 @ 03:15)  ED ADULT Nurse Reassessment Note [GERARDO Nielson] (09-02-18 @ 02:52)  Outpatient Clinical Summary - Medical  Group [O. Provider] (09-02-18 @ 01:39)  ED ADULT Nurse Reassessment Note [GERARDO Nielson] (09-02-18 @ 00:12)  ED ADULT Nurse Note [GERARDO Nielson] (09-01-18 @ 20:56)  ED Provider Note [RADHA Cohen] (09-01-18 @ 20:21)  ED ADULT Triage Note [GERARDO Acuna] (09-01-18 @ 19:45)  Outpatient Clinical Summary - Medical  Group [O. Provider] (09-01-18 @ 19:16)  Consult Note Adult-Heme/Onc Resident [RADHA Gutierrez] (09-01-18 @ 13:22)  Outpatient Clinical Summary - Medical  Group [O. Provider] (09-01-18 @ 13:17)  H&P Adult [ENZO Ty] (09-01-18 @ 13:16)  ED Provider Note [ANGELO Paez] (09-01-18 @ 09:14)  ED ADULT Nurse Note [KEREN Cat] (09-01-18 @ 08:46)  ED ADULT Triage Note [ALPHONSO Toure] (09-01-18 @ 08:26)  Outpatient Clinical Summary - Medical  Group [O. Provider] (09-01-18 @ 08:23)  ED ADULT Nurse Note [RADHA Alaniz] (05-15-18 @ 06:45)  ED Provider Note [ENZO Richardson] (05-15-18 @ 05:57)  ED ADULT Triage Note [ALFONSO Joy] (05-15-18 @ 05:15)  Outpatient Clinical Summary - Medical  Group [O. Provider] (05-15-18 @ 05:02)  Outpatient Clinical Summary - Medical  Group [O. Provider] (05-08-18 @ 07:37)  Outpatient Clinical Summary - Medical  Group [O. Provider] (05-08-18 @ 07:07)  Chart Note-Discharge Note PA [ENZO Miller] (05-07-18 @ 08:30)  Chart Note-Follow up note PA [ENZO Miller] (05-07-18 @ 07:16)  Chart Note-Event Note RT [ANGELO Mercado] (05-06-18 @ 22:33)  H&P Adult [HARRY Miller] (05-05-18 @ 22:19)  ED Provider Note [ANGELO Hutton] (05-05-18 @ 19:39)  ED ADULT Nurse Note [FOUZIA Peng] (05-05-18 @ 18:36)  ED ADULT Triage Note [FOUZIA Michelle] (05-05-18 @ 18:03)

## 2018-09-27 NOTE — ED PROVIDER NOTE - PHYSICAL EXAMINATION
Alert, NAD, WDWN, non-toxic appearing  PERRL, EOMI, normal pupils, no icterus, normal external ENT, (+) mouth sores   Airway intact, normal resp effort w/o tachypnea, speaking full sentences, lungs CTA b/l  CVS1S2, RRR, no m/g/r, no JVD, 2+ pulses b/l, no edema, warm/well-perfused  Abdomen soft, no tender/dist/guard/rebound, no CVA tender  FROM all 4 ext, no bony tender/deform, skin warm/dry, no rash  AAOx3, CN 2-12 intact, normal motor, normal sensory, normal gait

## 2018-09-27 NOTE — ED PROVIDER NOTE - NS ED ROS FT
Review of Systems:  	•	CONSTITUTIONAL - (+) fever  	•	SKIN - no rash  	•	HEMATOLOGIC - no bleeding, no bruising  	•	EYES - no eye pain, no blurry vision  	•	ENT - no change in hearing, no sore throat, no ear pain or tinnitus, (+) sores in the mouth  	•	RESPIRATORY - no shortness of breath, no cough  	•	CARDIAC - no chest pain, no palpitations  	•	GI - no abd pain, no nausea, no vomiting, no diarrhea, no constipation  	•	GENITO-URINARY - no discharge, no dysuria; no hematuria, no increased urinary frequency  	•	MUSCULOSKELETAL - no joint paint, no swelling, no redness  	•	NEUROLOGIC - no weakness, no headache, no paresthesias, no LOC  	•	PSYCH - no anxiety, non suicidal, non homicidal, no hallucination, no depression

## 2018-09-27 NOTE — ED PROVIDER NOTE - ATTENDING CONTRIBUTION TO CARE
48 y.o. male, PMH of Alcohol use, Illicit drug use, neutropenia by hx cocaine induced, signed out AMA from the hospital today, where he was admitted for subjective fevers and mouth sores. Pt came in for abx and reevaluation. Labs sent, hospitalist called for readmission but pt decided to elope.

## 2018-09-27 NOTE — ED PROVIDER NOTE - OBJECTIVE STATEMENT
48 y.o. male, PMH of Alcohol use, Illicit drug use, neutropenia by hx cocaine induced, signed out AMA from the hospital today, where he was admitted for subjective fevers and mouth sores. Pt came in for abx and reevaluation.

## 2018-09-28 DIAGNOSIS — Z02.9 ENCOUNTER FOR ADMINISTRATIVE EXAMINATIONS, UNSPECIFIED: ICD-10-CM

## 2018-10-02 ENCOUNTER — INPATIENT (INPATIENT)
Facility: HOSPITAL | Age: 48
LOS: 5 days | Discharge: HOME | End: 2018-10-08
Attending: HOSPITALIST | Admitting: HOSPITALIST

## 2018-10-02 VITALS
OXYGEN SATURATION: 100 % | TEMPERATURE: 98 F | WEIGHT: 220.02 LBS | RESPIRATION RATE: 18 BRPM | DIASTOLIC BLOOD PRESSURE: 82 MMHG | HEART RATE: 79 BPM | HEIGHT: 75 IN | SYSTOLIC BLOOD PRESSURE: 124 MMHG

## 2018-10-02 DIAGNOSIS — D70.9 NEUTROPENIA, UNSPECIFIED: ICD-10-CM

## 2018-10-02 DIAGNOSIS — D64.9 ANEMIA, UNSPECIFIED: ICD-10-CM

## 2018-10-02 DIAGNOSIS — K12.1 OTHER FORMS OF STOMATITIS: ICD-10-CM

## 2018-10-02 DIAGNOSIS — F14.19 COCAINE ABUSE WITH UNSPECIFIED COCAINE-INDUCED DISORDER: ICD-10-CM

## 2018-10-02 DIAGNOSIS — K12.30 ORAL MUCOSITIS (ULCERATIVE), UNSPECIFIED: ICD-10-CM

## 2018-10-02 LAB
ALBUMIN SERPL ELPH-MCNC: 3.4 G/DL — LOW (ref 3.5–5.2)
ALP SERPL-CCNC: 53 U/L — SIGNIFICANT CHANGE UP (ref 30–115)
ALT FLD-CCNC: 23 U/L — SIGNIFICANT CHANGE UP (ref 0–41)
ANION GAP SERPL CALC-SCNC: 12 MMOL/L — SIGNIFICANT CHANGE UP (ref 7–14)
APTT BLD: 38.2 SEC — SIGNIFICANT CHANGE UP (ref 27–39.2)
AST SERPL-CCNC: 19 U/L — SIGNIFICANT CHANGE UP (ref 0–41)
BASOPHILS # BLD AUTO: 0 K/UL — SIGNIFICANT CHANGE UP (ref 0–0.2)
BASOPHILS NFR BLD AUTO: 0 % — SIGNIFICANT CHANGE UP (ref 0–1)
BILIRUB SERPL-MCNC: 0.5 MG/DL — SIGNIFICANT CHANGE UP (ref 0.2–1.2)
BUN SERPL-MCNC: 13 MG/DL — SIGNIFICANT CHANGE UP (ref 10–20)
CALCIUM SERPL-MCNC: 8.5 MG/DL — SIGNIFICANT CHANGE UP (ref 8.5–10.1)
CHLORIDE SERPL-SCNC: 101 MMOL/L — SIGNIFICANT CHANGE UP (ref 98–110)
CO2 SERPL-SCNC: 24 MMOL/L — SIGNIFICANT CHANGE UP (ref 17–32)
CREAT SERPL-MCNC: 1 MG/DL — SIGNIFICANT CHANGE UP (ref 0.7–1.5)
CULTURE RESULTS: SIGNIFICANT CHANGE UP
CULTURE RESULTS: SIGNIFICANT CHANGE UP
EOSINOPHIL NFR BLD AUTO: 0 % — SIGNIFICANT CHANGE UP (ref 0–8)
GLUCOSE SERPL-MCNC: 116 MG/DL — HIGH (ref 70–99)
HCT VFR BLD CALC: 33.6 % — LOW (ref 42–52)
HGB BLD-MCNC: 10.8 G/DL — LOW (ref 14–18)
INR BLD: 1.41 RATIO — HIGH (ref 0.65–1.3)
LYMPHOCYTES # BLD AUTO: 1.3 K/UL — SIGNIFICANT CHANGE UP (ref 1.2–3.4)
LYMPHOCYTES # BLD AUTO: 72 % — HIGH (ref 20.5–51.1)
MCHC RBC-ENTMCNC: 26.2 PG — LOW (ref 27–31)
MCHC RBC-ENTMCNC: 32.1 G/DL — SIGNIFICANT CHANGE UP (ref 32–37)
MCV RBC AUTO: 81.4 FL — SIGNIFICANT CHANGE UP (ref 80–94)
METAMYELOCYTES # FLD: 1 % — HIGH (ref 0–0)
MONOCYTES # BLD AUTO: 0.22 K/UL — SIGNIFICANT CHANGE UP (ref 0.1–0.6)
MONOCYTES NFR BLD AUTO: 12 % — HIGH (ref 1.7–9.3)
NEUTROPHILS # BLD AUTO: 0.25 K/UL — LOW (ref 1.4–6.5)
NEUTROPHILS NFR BLD AUTO: 14 % — LOW (ref 42.2–75.2)
NRBC # BLD: 0 /100 — SIGNIFICANT CHANGE UP (ref 0–0)
NRBC # BLD: SIGNIFICANT CHANGE UP /100 WBCS (ref 0–0)
PLAT MORPH BLD: NORMAL — SIGNIFICANT CHANGE UP
PLATELET # BLD AUTO: 271 K/UL — SIGNIFICANT CHANGE UP (ref 130–400)
PLATELET COUNT - ESTIMATE: NORMAL — SIGNIFICANT CHANGE UP
POTASSIUM SERPL-MCNC: 4.1 MMOL/L — SIGNIFICANT CHANGE UP (ref 3.5–5)
POTASSIUM SERPL-SCNC: 4.1 MMOL/L — SIGNIFICANT CHANGE UP (ref 3.5–5)
PROT SERPL-MCNC: 7.1 G/DL — SIGNIFICANT CHANGE UP (ref 6–8)
PROTHROM AB SERPL-ACNC: 15.3 SEC — HIGH (ref 9.95–12.87)
RBC # BLD: 4.13 M/UL — LOW (ref 4.7–6.1)
RBC # FLD: 16.1 % — HIGH (ref 11.5–14.5)
RBC BLD AUTO: NORMAL — SIGNIFICANT CHANGE UP
SODIUM SERPL-SCNC: 137 MMOL/L — SIGNIFICANT CHANGE UP (ref 135–146)
SPECIMEN SOURCE: SIGNIFICANT CHANGE UP
SPECIMEN SOURCE: SIGNIFICANT CHANGE UP
VARIANT LYMPHS # BLD: 1 % — SIGNIFICANT CHANGE UP (ref 0–5)
WBC # BLD: 1.8 K/UL — LOW (ref 4.8–10.8)
WBC # FLD AUTO: 1.8 K/UL — LOW (ref 4.8–10.8)

## 2018-10-02 RX ORDER — FILGRASTIM 480MCG/1.6
480 VIAL (ML) INJECTION DAILY
Qty: 0 | Refills: 0 | Status: COMPLETED | OUTPATIENT
Start: 2018-10-02 | End: 2018-10-04

## 2018-10-02 RX ORDER — ACETAMINOPHEN 500 MG
650 TABLET ORAL EVERY 6 HOURS
Qty: 0 | Refills: 0 | Status: DISCONTINUED | OUTPATIENT
Start: 2018-10-02 | End: 2018-10-08

## 2018-10-02 RX ORDER — DIPHENHYDRAMINE HYDROCHLORIDE AND LIDOCAINE HYDROCHLORIDE AND ALUMINUM HYDROXIDE AND MAGNESIUM HYDRO
30 KIT EVERY 12 HOURS
Qty: 0 | Refills: 0 | Status: DISCONTINUED | OUTPATIENT
Start: 2018-10-02 | End: 2018-10-08

## 2018-10-02 RX ORDER — FLUCONAZOLE 150 MG/1
200 TABLET ORAL DAILY
Qty: 0 | Refills: 0 | Status: DISCONTINUED | OUTPATIENT
Start: 2018-10-02 | End: 2018-10-08

## 2018-10-02 RX ORDER — FLUCONAZOLE 150 MG/1
100 TABLET ORAL ONCE
Qty: 0 | Refills: 0 | Status: COMPLETED | OUTPATIENT
Start: 2018-10-02 | End: 2018-10-02

## 2018-10-02 RX ORDER — ONDANSETRON 8 MG/1
4 TABLET, FILM COATED ORAL EVERY 6 HOURS
Qty: 0 | Refills: 0 | Status: DISCONTINUED | OUTPATIENT
Start: 2018-10-02 | End: 2018-10-08

## 2018-10-02 RX ADMIN — FLUCONAZOLE 100 MILLIGRAM(S): 150 TABLET ORAL at 19:37

## 2018-10-02 NOTE — ED ADULT NURSE NOTE - NSIMPLEMENTINTERV_GEN_ALL_ED
Implemented All Universal Safety Interventions:  [place] to call system. Call bell, personal items and telephone within reach. Instruct patient to call for assistance. Room bathroom lighting operational. Non-slip footwear when patient is off stretcher. Physically safe environment: no spills, clutter or unnecessary equipment. Stretcher in lowest position, wheels locked, appropriate side rails in place.

## 2018-10-02 NOTE — ED PROVIDER NOTE - MOUTH
ULCERS/buccal mucosa ULCERS/buccal mucosa with surrounding erythematous halos and central whitish plaques

## 2018-10-02 NOTE — H&P ADULT - HISTORY OF PRESENT ILLNESS
47yo M presents stating that he was admitted to hospital last week for a WBC of 1.6 and states that he also has sores in his mouth. Pt states that he has not been using drugs for 4 years and after a break up with his girlfriend he began using drugs again and since then he has had decreased po intake. patient denies any IV drug usage. patient states he drinks alcohol and snorts cocaine. Pt denies any CP, SOB, nausea, vomiting. patient states he needs a neupogen injection. patient states that he has had similar episodes in past while abusing drugs. patient had full workup including bone marrow biopsy in 2012. patient refuses further workup at this time. patient denies any fever,chills, weight loss. patient admits to cough .

## 2018-10-02 NOTE — ED PROVIDER NOTE - ATTENDING CONTRIBUTION TO CARE
Pt with h/o cocaine induced neutropenia left AMA the other day returns for persistent painful mouth ulcers preventing him from eating, also with nonproductive cough, no fever, on exam vital signs appreciated, nontoxic appearing, conj pink, mouth with aphthous ulcers with white base, minimal thrush, neck supple cor rrr lungs cta abd snt healing rash neuro itnact, labs reviewed (pt refuses cxr), , will admit for IVF, neupogen

## 2018-10-02 NOTE — ED PROVIDER NOTE - OBJECTIVE STATEMENT
49yo M comes in stating leo the was in the hopsital for 2 days last week had a WBC of 1.6 and states that he also has sores in his mouth. Pt states that he has been clean from drugs for 4 years and after a break up with his girlfriend he began using drugs again and since then he hassnt been able to eat secondary to the sores in his mouth. 47yo M comes in stating that he was in the hospital for 2 days last week had a WBC of 1.6 and states that he also has sores in his mouth. Pt states that he has been clean from drugs for 4 years and after a break up with his girlfriend he began using drugs again and since then he hasn't been able to eat secondary to the sores in his mouth. Pt denies any CP, SOB, fever, chills, nausea, vomiting. 49yo M presents stating that he was admitted to hospital last week for a WBC of 1.6 and states that he also has sores in his mouth. Pt states that he has not been using drugs for 4 years and after a break up with his girlfriend he began using drugs again and since then he has had decreased po intake. patient denies any IV drug usage. patient states he drinks alcohol and snorts cocaine. Pt denies any CP, SOB, nausea, vomiting. patient states he needs a neupogen injection. patient states that he has had similar episodes in past while abusing drugs. patient had full workup including bone marrow biopsy in 2012. patient refuses further workup at this time. patient denies any fever,chills, weight loss. patient admits to cough .

## 2018-10-02 NOTE — ED ADULT TRIAGE NOTE - CHIEF COMPLAINT QUOTE
patient's states he has a weakened immune system since 2012, has developed sores in his mouth, was in the hospital "a couple of days ago" and left AMA for work. states still has painful sores in his mouth

## 2018-10-02 NOTE — ED PROVIDER NOTE - MEDICAL DECISION MAKING DETAILS
Pt recently admitted for neutropenia but left AMA to go to work returns for readmission, states is oral ulcers prevent po intake, +cough, no fever, PE as above, labs and studies reviewed, will readmit, stable for floor (pt refuses CXR)

## 2018-10-03 LAB
ANION GAP SERPL CALC-SCNC: 13 MMOL/L — SIGNIFICANT CHANGE UP (ref 7–14)
BASOPHILS # BLD AUTO: 0.03 K/UL — SIGNIFICANT CHANGE UP (ref 0–0.2)
BASOPHILS NFR BLD AUTO: 2.6 % — HIGH (ref 0–1)
BUN SERPL-MCNC: 8 MG/DL — LOW (ref 10–20)
CALCIUM SERPL-MCNC: 8.5 MG/DL — SIGNIFICANT CHANGE UP (ref 8.5–10.1)
CHLORIDE SERPL-SCNC: 100 MMOL/L — SIGNIFICANT CHANGE UP (ref 98–110)
CO2 SERPL-SCNC: 24 MMOL/L — SIGNIFICANT CHANGE UP (ref 17–32)
CREAT SERPL-MCNC: 0.9 MG/DL — SIGNIFICANT CHANGE UP (ref 0.7–1.5)
EOSINOPHIL # BLD AUTO: 0.1 K/UL — SIGNIFICANT CHANGE UP (ref 0–0.7)
EOSINOPHIL NFR BLD AUTO: 8.7 % — HIGH (ref 0–8)
GLUCOSE SERPL-MCNC: 117 MG/DL — HIGH (ref 70–99)
HCT VFR BLD CALC: 33.4 % — LOW (ref 42–52)
HGB BLD-MCNC: 10.7 G/DL — LOW (ref 14–18)
IMM GRANULOCYTES NFR BLD AUTO: 0 % — LOW (ref 0.1–0.3)
LYMPHOCYTES # BLD AUTO: 0.8 K/UL — LOW (ref 1.2–3.4)
LYMPHOCYTES # BLD AUTO: 69.6 % — HIGH (ref 20.5–51.1)
MAGNESIUM SERPL-MCNC: 2 MG/DL — SIGNIFICANT CHANGE UP (ref 1.8–2.4)
MCHC RBC-ENTMCNC: 26.2 PG — LOW (ref 27–31)
MCHC RBC-ENTMCNC: 32 G/DL — SIGNIFICANT CHANGE UP (ref 32–37)
MCV RBC AUTO: 81.9 FL — SIGNIFICANT CHANGE UP (ref 80–94)
MONOCYTES # BLD AUTO: 0.2 K/UL — SIGNIFICANT CHANGE UP (ref 0.1–0.6)
MONOCYTES NFR BLD AUTO: 17.4 % — HIGH (ref 1.7–9.3)
NEUTROPHILS # BLD AUTO: 0.02 K/UL — LOW (ref 1.4–6.5)
NEUTROPHILS NFR BLD AUTO: 1.7 % — LOW (ref 42.2–75.2)
NRBC # BLD: 0 /100 WBCS — SIGNIFICANT CHANGE UP (ref 0–0)
PLATELET # BLD AUTO: 251 K/UL — SIGNIFICANT CHANGE UP (ref 130–400)
POTASSIUM SERPL-MCNC: 4.3 MMOL/L — SIGNIFICANT CHANGE UP (ref 3.5–5)
POTASSIUM SERPL-SCNC: 4.3 MMOL/L — SIGNIFICANT CHANGE UP (ref 3.5–5)
RBC # BLD: 4.08 M/UL — LOW (ref 4.7–6.1)
RBC # FLD: 15.9 % — HIGH (ref 11.5–14.5)
SODIUM SERPL-SCNC: 137 MMOL/L — SIGNIFICANT CHANGE UP (ref 135–146)
WBC # BLD: 1.15 K/UL — LOW (ref 4.8–10.8)
WBC # FLD AUTO: 1.15 K/UL — LOW (ref 4.8–10.8)

## 2018-10-03 RX ORDER — CIPROFLOXACIN LACTATE 400MG/40ML
500 VIAL (ML) INTRAVENOUS EVERY 12 HOURS
Qty: 0 | Refills: 0 | Status: DISCONTINUED | OUTPATIENT
Start: 2018-10-03 | End: 2018-10-03

## 2018-10-03 RX ORDER — VANCOMYCIN HCL 1 G
1500 VIAL (EA) INTRAVENOUS ONCE
Qty: 0 | Refills: 0 | Status: COMPLETED | OUTPATIENT
Start: 2018-10-03 | End: 2018-10-03

## 2018-10-03 RX ORDER — IBUPROFEN 200 MG
600 TABLET ORAL EVERY 8 HOURS
Qty: 0 | Refills: 0 | Status: DISCONTINUED | OUTPATIENT
Start: 2018-10-03 | End: 2018-10-08

## 2018-10-03 RX ORDER — VANCOMYCIN HCL 1 G
1500 VIAL (EA) INTRAVENOUS EVERY 12 HOURS
Qty: 0 | Refills: 0 | Status: DISCONTINUED | OUTPATIENT
Start: 2018-10-03 | End: 2018-10-06

## 2018-10-03 RX ORDER — MEROPENEM 1 G/30ML
1000 INJECTION INTRAVENOUS EVERY 8 HOURS
Qty: 0 | Refills: 0 | Status: DISCONTINUED | OUTPATIENT
Start: 2018-10-04 | End: 2018-10-08

## 2018-10-03 RX ORDER — MEROPENEM 1 G/30ML
INJECTION INTRAVENOUS
Qty: 0 | Refills: 0 | Status: DISCONTINUED | OUTPATIENT
Start: 2018-10-04 | End: 2018-10-08

## 2018-10-03 RX ORDER — CEFEPIME 1 G/1
2000 INJECTION, POWDER, FOR SOLUTION INTRAMUSCULAR; INTRAVENOUS ONCE
Qty: 0 | Refills: 0 | Status: COMPLETED | OUTPATIENT
Start: 2018-10-03 | End: 2018-10-03

## 2018-10-03 RX ORDER — ACETAMINOPHEN 500 MG
650 TABLET ORAL EVERY 6 HOURS
Qty: 0 | Refills: 0 | Status: DISCONTINUED | OUTPATIENT
Start: 2018-10-03 | End: 2018-10-08

## 2018-10-03 RX ORDER — CEFEPIME 1 G/1
INJECTION, POWDER, FOR SOLUTION INTRAMUSCULAR; INTRAVENOUS
Qty: 0 | Refills: 0 | Status: DISCONTINUED | OUTPATIENT
Start: 2018-10-03 | End: 2018-10-03

## 2018-10-03 RX ORDER — MEROPENEM 1 G/30ML
1000 INJECTION INTRAVENOUS ONCE
Qty: 0 | Refills: 0 | Status: COMPLETED | OUTPATIENT
Start: 2018-10-03 | End: 2018-10-04

## 2018-10-03 RX ORDER — VANCOMYCIN HCL 1 G
VIAL (EA) INTRAVENOUS
Qty: 0 | Refills: 0 | Status: COMPLETED | OUTPATIENT
Start: 2018-10-03 | End: 2018-10-04

## 2018-10-03 RX ADMIN — FLUCONAZOLE 200 MILLIGRAM(S): 150 TABLET ORAL at 11:34

## 2018-10-03 RX ADMIN — Medication 650 MILLIGRAM(S): at 06:44

## 2018-10-03 RX ADMIN — Medication 600 MILLIGRAM(S): at 18:47

## 2018-10-03 RX ADMIN — Medication 480 MICROGRAM(S): at 00:02

## 2018-10-03 RX ADMIN — Medication 650 MILLIGRAM(S): at 06:05

## 2018-10-03 RX ADMIN — Medication 480 MICROGRAM(S): at 11:35

## 2018-10-03 RX ADMIN — DIPHENHYDRAMINE HYDROCHLORIDE AND LIDOCAINE HYDROCHLORIDE AND ALUMINUM HYDROXIDE AND MAGNESIUM HYDRO 30 MILLILITER(S): KIT at 18:53

## 2018-10-03 RX ADMIN — Medication 650 MILLIGRAM(S): at 00:02

## 2018-10-03 RX ADMIN — Medication 650 MILLIGRAM(S): at 14:25

## 2018-10-03 RX ADMIN — DIPHENHYDRAMINE HYDROCHLORIDE AND LIDOCAINE HYDROCHLORIDE AND ALUMINUM HYDROXIDE AND MAGNESIUM HYDRO 30 MILLILITER(S): KIT at 06:05

## 2018-10-03 RX ADMIN — Medication 500 MILLIGRAM(S): at 18:47

## 2018-10-03 RX ADMIN — DIPHENHYDRAMINE HYDROCHLORIDE AND LIDOCAINE HYDROCHLORIDE AND ALUMINUM HYDROXIDE AND MAGNESIUM HYDRO 30 MILLILITER(S): KIT at 00:02

## 2018-10-03 RX ADMIN — CEFEPIME 100 MILLIGRAM(S): 1 INJECTION, POWDER, FOR SOLUTION INTRAMUSCULAR; INTRAVENOUS at 18:47

## 2018-10-03 RX ADMIN — Medication 650 MILLIGRAM(S): at 01:32

## 2018-10-03 NOTE — CONSULT NOTE ADULT - SUBJECTIVE AND OBJECTIVE BOX
Patient is a 48y old  Male who presents with a chief complaint of Neutropenia (03 Oct 2018 13:03)      INTERVAL HPI/OVERNIGHT EVENTS:  T(C): 37.1 (10-03-18 @ 21:40), Max: 39.4 (10-03-18 @ 14:30)  HR: 81 (10-03-18 @ 21:40) (81 - 94)  BP: 93/53 (10-03-18 @ 21:40) (93/53 - 119/63)  RR: 16 (10-03-18 @ 21:40) (16 - 16)  SpO2: --  Wt(kg): --  I&O's Summary      PAST MEDICAL & SURGICAL HISTORY:  Neutropenia  Illicit drug use  Alcoholism  No significant past surgical history      SOCIAL HISTORY  Alcohol:  Tobacco:  Illicit substance use:      FAMILY HISTORY:      LABS:                        10.7   1.15  )-----------( 251      ( 03 Oct 2018 06:50 )             33.4     10-03    137  |  100  |  8<L>  ----------------------------<  117<H>  4.3   |  24  |  0.9    Ca    8.5      03 Oct 2018 06:50  Mg     2.0     10-03    TPro  7.1  /  Alb  3.4<L>  /  TBili  0.5  /  DBili  x   /  AST  19  /  ALT  23  /  AlkPhos  53  10-02    PT/INR - ( 02 Oct 2018 17:48 )   PT: 15.30 sec;   INR: 1.41 ratio         PTT - ( 02 Oct 2018 17:48 )  PTT:38.2 sec    CAPILLARY BLOOD GLUCOSE                MEDICATIONS  (STANDING):  cefepime   IVPB      filgrastim-sndz Injectable 480 MICROGram(s) SubCutaneous daily  FIRST- Mouthwash  BLM 30 milliLiter(s) Swish and Swallow every 12 hours  fluconAZOLE   Tablet 200 milliGRAM(s) Oral daily  vancomycin  IVPB 1500 milliGRAM(s) IV Intermittent every 12 hours    MEDICATIONS  (PRN):  acetaminophen   Tablet .. 650 milliGRAM(s) Oral every 6 hours PRN Temp greater or equal to 38C (100.4F), Mild Pain (1 - 3)  acetaminophen   Tablet .. 650 milliGRAM(s) Oral every 6 hours PRN Moderate Pain (4 - 6), Severe Pain (7 - 10)  ibuprofen  Tablet. 600 milliGRAM(s) Oral every 8 hours PRN Temp greater or equal to 38.5C (101.3F)  ondansetron Injectable 4 milliGRAM(s) IV Push every 6 hours PRN Nausea      REVIEW OF SYSTEMS:  CONSTITUTIONAL: No fever, weight loss, or fatigue  EYES: No eye pain, visual disturbances, or discharge  ENMT:  No difficulty hearing, tinnitus, vertigo; No sinus or throat pain  NECK: No pain or stiffness  RESPIRATORY: No cough, wheezing, chills or hemoptysis; No shortness of breath  CARDIOVASCULAR: No chest pain, palpitations, dizziness, or leg swelling  GASTROINTESTINAL: No abdominal or epigastric pain. No nausea, vomiting, or hematemesis; No diarrhea or constipation. No melena or hematochezia.  GENITOURINARY: No dysuria, frequency, hematuria, or incontinence  NEUROLOGICAL: No headaches, memory loss, loss of strength, numbness, or tremors  SKIN: No itching, burning, rashes, or lesions   LYMPH NODES: No enlarged glands  ENDOCRINE: No heat or cold intolerance; No hair loss  MUSCULOSKELETAL: No joint pain or swelling; No muscle, back, or extremity pain  PSYCHIATRIC: No depression, anxiety, mood swings, or difficulty sleeping  HEME/LYMPH: No easy bruising, or bleeding gums  ALLERY AND IMMUNOLOGIC: No hives or eczema    RADIOLOGY & ADDITIONAL TESTS:    Imaging Personally Reviewed:  [ ] YES  [ ] NO    Consultant(s) Notes Reviewed:  [ ] YES  [ ] NO    PHYSICAL EXAM:  GENERAL: NAD, well-groomed, well-developed  HEAD:  Atraumatic, Normocephalic  EYES: EOMI, PERRLA, conjunctiva and sclera clear  ENMT: No tonsillar erythema, exudates, or enlargement; Moist mucous membranes, Good dentition, No lesions  NECK: Supple, No JVD, Normal thyroid  NERVOUS SYSTEM:  Alert & Oriented X3, Good concentration; Motor Strength 5/5 B/L upper and lower extremities; DTRs 2+ intact and symmetric  CHEST/LUNG: Clear to percussion bilaterally; No rales, rhonchi, wheezing, or rubs  HEART: Regular rate and rhythm; No murmurs, rubs, or gallops  ABDOMEN: Soft, Nontender, Nondistended; Bowel sounds present  EXTREMITIES:  2+ Peripheral Pulses, No clubbing, cyanosis, or edema  LYMPH: No lymphadenopathy noted  SKIN: No rashes or lesions    Care Discussed with Consultants/Other Providers [ ] YES  [ ] NO Patient is a 48y old  Male  with PMH of cocaine induced neutropenia presents with subjective fevers and mouth sores x 2 weeks. He left AMA last week due to risk of losing employment. He returns as he continues to have oral ulcers and mucositis limiting his ability to eat and subjective fevers.           REVIEW OF SYSTEMS: Total of twelve systems have been reviewed with patient and found to be negative unless mentioned in HPI        PAST MEDICAL & SURGICAL HISTORY:  Neutropenia  Illicit drug use  Alcoholism  No significant past surgical history      SOCIAL HISTORY  Alcohol: Does not drink  Tobacco: Does not smoke  Illicit substance use: ? Cocaine        FAMILY HISTORY: Non contributory to the present illness      ALLERGIES: NKDA        T(C): 37.1 (10-03-18 @ 21:40), Max: 39.4 (10-03-18 @ 14:30)  HR: 81 (10-03-18 @ 21:40) (81 - 94)  BP: 93/53 (10-03-18 @ 21:40) (93/53 - 119/63)  RR: 16 (10-03-18 @ 21:40) (16 - 16)  SpO2: --  Wt(kg): --  I&O's Summary      PHYSICAL EXAM:  GENERAL: Ill appearing  HEENT: ulcerated few lesions on both lips  CHEST/LUNG: Air entry bilaterally  HEART: s1 and s2 present   ABDOMEN: Nontender, and Nondistended   EXTREMITIES:  No pedal  edema  CNS: AAOX3        LABS:                        10.7   1.15  )-----------( 251      ( 03 Oct 2018 06:50 )             33.4     10-03    137  |  100  |  8<L>  ----------------------------<  117<H>  4.3   |  24  |  0.9    Ca    8.5      03 Oct 2018 06:50  Mg     2.0     10-03    TPro  7.1  /  Alb  3.4<L>  /  TBili  0.5  /  DBili  x   /  AST  19  /  ALT  23  /  AlkPhos  53  10-02    PT/INR - ( 02 Oct 2018 17:48 )   PT: 15.30 sec;   INR: 1.41 ratio         PTT - ( 02 Oct 2018 17:48 )  PTT:38.2 sec        MEDICATIONS  (STANDING):  cefepime   IVPB      filgrastim-sndz Injectable 480 MICROGram(s) SubCutaneous daily  FIRST- Mouthwash  BLM 30 milliLiter(s) Swish and Swallow every 12 hours  fluconAZOLE   Tablet 200 milliGRAM(s) Oral daily  vancomycin  IVPB 1500 milliGRAM(s) IV Intermittent every 12 hours    MEDICATIONS  (PRN):  acetaminophen   Tablet .. 650 milliGRAM(s) Oral every 6 hours PRN Temp greater or equal to 38C (100.4F), Mild Pain (1 - 3)  acetaminophen   Tablet .. 650 milliGRAM(s) Oral every 6 hours PRN Moderate Pain (4 - 6), Severe Pain (7 - 10)  ibuprofen  Tablet. 600 milliGRAM(s) Oral every 8 hours PRN Temp greater or equal to 38.5C (101.3F)  ondansetron Injectable 4 milliGRAM(s) IV Push every 6 hours PRN Nausea        RADIOLOGY & ADDITIONAL TESTS:    < from: Xray Chest 2 Views PA/Lat (09.25.18 @ 18:28) >  No radiographic evidence of acute cardiopulmonary disease.      < end of copied text >          MICROBIOLOGY DATA:    Culture - Blood (09.26.18 @ 12:24)    Specimen Source: .Blood Blood-Peripheral    Culture Results:   No growth at 5 days.      Culture - Blood (09.26.18 @ 12:24)    Specimen Source: .Blood Blood-Venous    Culture Results:   No growth at 5 days.

## 2018-10-03 NOTE — PROGRESS NOTE ADULT - ASSESSMENT
49 yo male with PMH of cocaine induced neutropenia presents with subjective fevers and mouth sores x 2 weeks. He left AMA last week due to risk of losing employment. He returns as he continues to have oral ulcers and mucositis limiting his ability to eat and subjective fevers.       # Severe Neutropenia:    - c/w Cipro Augmentin, and Diflucan    - ID consult  - heme consult   - c/w neupogen   - check differential on CBC qday    # Mucositis   - Magic mouthwash with oral care     # Cocaine abuse    #VTE prophylaxis: low risk 47 yo male with PMH of cocaine induced neutropenia presents with subjective fevers and mouth sores x 2 weeks. He left AMA last week due to risk of losing employment. He returns as he continues to have oral ulcers and mucositis limiting his ability to eat and subjective fevers.       # Severe Neutropenia:    - c/w Cipro Augmentin, and Diflucan    - ID consult  - heme consult   - c/w neupogen   - check differential on CBC qday    #Chronic Normocytic Anemia   - f/u anemia panel     # Mucositis   - Magic mouthwash with oral care     # Cocaine abuse    #VTE prophylaxis: low risk

## 2018-10-03 NOTE — PROGRESS NOTE ADULT - SUBJECTIVE AND OBJECTIVE BOX
Pt seen and examined at bedside. Reports painful oral ulcers limiting PO intake.     VITAL SIGNS (Last 24 hrs):  T(C): 37.7 (10-03-18 @ 05:30), Max: 37.7 (10-03-18 @ 05:30)  HR: 85 (10-03-18 @ 05:30) (75 - 89)  BP: 119/63 (10-03-18 @ 05:30) (119/63 - 134/80)  RR: 16 (10-03-18 @ 05:30) (16 - 18)  SpO2: 100% (10-02-18 @ 19:01) (100% - 100%)  Wt(kg): --  Daily Height in cm: 190.5 (02 Oct 2018 22:40)    Daily     I&O's Summary      PHYSICAL EXAM:  GENERAL: NAD, well-developed  HEAD:  Atraumatic, Normocephalic  EYES: EOMI, PERRLA, conjunctiva and sclera clear  Oral: mucositis with oral ulcers   NECK: Supple, No JVD  CHEST/LUNG: Clear to auscultation bilaterally; No wheeze  HEART: Regular rate and rhythm; No murmurs, rubs, or gallops  ABDOMEN: Soft, Nontender, Nondistended; Bowel sounds present  EXTREMITIES:  2+ Peripheral Pulses, No clubbing, cyanosis, or edema  PSYCH: AAOx3  NEUROLOGY: non-focal  SKIN: No rashes or lesions    Labs Reviewed  Spoke to patient in regards to abnormal labs.    CBC Full  -  ( 03 Oct 2018 06:50 )  WBC Count : 1.15 K/uL  Hemoglobin : 10.7 g/dL  Hematocrit : 33.4 %  Platelet Count - Automated : 251 K/uL  Mean Cell Volume : 81.9 fL  Mean Cell Hemoglobin : 26.2 pg  Mean Cell Hemoglobin Concentration : 32.0 g/dL  Auto Neutrophil # : 0.02 K/uL  Auto Lymphocyte # : 0.80 K/uL  Auto Monocyte # : 0.20 K/uL  Auto Eosinophil # : 0.10 K/uL  Auto Basophil # : 0.03 K/uL  Auto Neutrophil % : 1.7 %  Auto Lymphocyte % : 69.6 %  Auto Monocyte % : 17.4 %  Auto Eosinophil % : 8.7 %  Auto Basophil % : 2.6 %    BMP:    10-03 @ 06:50    Blood Urea Nitrogen - 8  Calcium - 8.5  Carbon Dioxide - 24  Chloride - 100  Creatinine - 0.9  Glucose - 117  Potassium - 4.3  Sodium - 137      Hemoglobin A1c -   PT/INR - ( 02 Oct 2018 17:48 )   PT: 15.30 sec;   INR: 1.41 ratio         PTT - ( 02 Oct 2018 17:48 )  PTT:38.2 sec  Urine Culture:      MEDICATIONS  (STANDING):  amoxicillin  875 milliGRAM(s)/clavulanate 1 Tablet(s) Oral two times a day  ciprofloxacin     Tablet 500 milliGRAM(s) Oral every 12 hours  filgrastim-sndz Injectable 480 MICROGram(s) SubCutaneous daily  FIRST- Mouthwash  BLM 30 milliLiter(s) Swish and Swallow every 12 hours  fluconAZOLE   Tablet 200 milliGRAM(s) Oral daily    MEDICATIONS  (PRN):  acetaminophen   Tablet .. 650 milliGRAM(s) Oral every 6 hours PRN Temp greater or equal to 38C (100.4F), Mild Pain (1 - 3)  acetaminophen   Tablet .. 650 milliGRAM(s) Oral every 6 hours PRN Moderate Pain (4 - 6), Severe Pain (7 - 10)  ondansetron Injectable 4 milliGRAM(s) IV Push every 6 hours PRN Nausea

## 2018-10-04 LAB
BASOPHILS # BLD AUTO: 0.03 K/UL — SIGNIFICANT CHANGE UP (ref 0–0.2)
BASOPHILS NFR BLD AUTO: 4.3 % — HIGH (ref 0–1)
EOSINOPHIL # BLD AUTO: 0.12 K/UL — SIGNIFICANT CHANGE UP (ref 0–0.7)
EOSINOPHIL NFR BLD AUTO: 17.1 % — HIGH (ref 0–8)
HCT VFR BLD CALC: 33.2 % — LOW (ref 42–52)
HGB BLD-MCNC: 10.7 G/DL — LOW (ref 14–18)
IMM GRANULOCYTES NFR BLD AUTO: 0 % — LOW (ref 0.1–0.3)
IRON SATN MFR SERPL: 11 % — LOW (ref 15–50)
IRON SATN MFR SERPL: 17 UG/DL — LOW (ref 35–150)
LYMPHOCYTES # BLD AUTO: 0.39 K/UL — LOW (ref 1.2–3.4)
LYMPHOCYTES # BLD AUTO: 55.7 % — HIGH (ref 20.5–51.1)
MCHC RBC-ENTMCNC: 26.8 PG — LOW (ref 27–31)
MCHC RBC-ENTMCNC: 32.2 G/DL — SIGNIFICANT CHANGE UP (ref 32–37)
MCV RBC AUTO: 83.2 FL — SIGNIFICANT CHANGE UP (ref 80–94)
MONOCYTES # BLD AUTO: 0.14 K/UL — SIGNIFICANT CHANGE UP (ref 0.1–0.6)
MONOCYTES NFR BLD AUTO: 20 % — HIGH (ref 1.7–9.3)
NEUTROPHILS # BLD AUTO: 0.02 K/UL — LOW (ref 1.4–6.5)
NEUTROPHILS NFR BLD AUTO: 2.9 % — LOW (ref 42.2–75.2)
NRBC # BLD: 0 /100 WBCS — SIGNIFICANT CHANGE UP (ref 0–0)
PLATELET # BLD AUTO: 269 K/UL — SIGNIFICANT CHANGE UP (ref 130–400)
RBC # BLD: 3.99 M/UL — LOW (ref 4.7–6.1)
RBC # BLD: 3.99 M/UL — LOW (ref 4.7–6.1)
RBC # FLD: 15.9 % — HIGH (ref 11.5–14.5)
RETICS #: 20.7 K/UL — LOW (ref 25–125)
RETICS/RBC NFR: 0.5 % — SIGNIFICANT CHANGE UP (ref 0.5–1.5)
TIBC SERPL-MCNC: 155 UG/DL — LOW (ref 220–430)
UIBC SERPL-MCNC: 138 UG/DL — SIGNIFICANT CHANGE UP (ref 110–370)
WBC # BLD: 0.7 K/UL — CRITICAL LOW (ref 4.8–10.8)
WBC # FLD AUTO: 0.7 K/UL — CRITICAL LOW (ref 4.8–10.8)

## 2018-10-04 RX ORDER — VALACYCLOVIR 500 MG/1
2000 TABLET, FILM COATED ORAL EVERY 12 HOURS
Qty: 0 | Refills: 0 | Status: DISCONTINUED | OUTPATIENT
Start: 2018-10-04 | End: 2018-10-04

## 2018-10-04 RX ORDER — SODIUM CHLORIDE 9 MG/ML
1000 INJECTION INTRAMUSCULAR; INTRAVENOUS; SUBCUTANEOUS
Qty: 0 | Refills: 0 | Status: DISCONTINUED | OUTPATIENT
Start: 2018-10-04 | End: 2018-10-07

## 2018-10-04 RX ORDER — FILGRASTIM 480MCG/1.6
480 VIAL (ML) INJECTION DAILY
Qty: 0 | Refills: 0 | Status: COMPLETED | OUTPATIENT
Start: 2018-10-05 | End: 2018-10-07

## 2018-10-04 RX ORDER — VALACYCLOVIR 500 MG/1
1000 TABLET, FILM COATED ORAL EVERY 8 HOURS
Qty: 0 | Refills: 0 | Status: DISCONTINUED | OUTPATIENT
Start: 2018-10-04 | End: 2018-10-08

## 2018-10-04 RX ADMIN — MEROPENEM 100 MILLIGRAM(S): 1 INJECTION INTRAVENOUS at 05:32

## 2018-10-04 RX ADMIN — Medication 300 MILLIGRAM(S): at 06:36

## 2018-10-04 RX ADMIN — VALACYCLOVIR 1000 MILLIGRAM(S): 500 TABLET, FILM COATED ORAL at 05:33

## 2018-10-04 RX ADMIN — SODIUM CHLORIDE 100 MILLILITER(S): 9 INJECTION INTRAMUSCULAR; INTRAVENOUS; SUBCUTANEOUS at 11:17

## 2018-10-04 RX ADMIN — VALACYCLOVIR 1000 MILLIGRAM(S): 500 TABLET, FILM COATED ORAL at 21:24

## 2018-10-04 RX ADMIN — VALACYCLOVIR 1000 MILLIGRAM(S): 500 TABLET, FILM COATED ORAL at 01:07

## 2018-10-04 RX ADMIN — FLUCONAZOLE 200 MILLIGRAM(S): 150 TABLET ORAL at 11:17

## 2018-10-04 RX ADMIN — MEROPENEM 100 MILLIGRAM(S): 1 INJECTION INTRAVENOUS at 01:08

## 2018-10-04 RX ADMIN — Medication 480 MICROGRAM(S): at 12:01

## 2018-10-04 RX ADMIN — VALACYCLOVIR 1000 MILLIGRAM(S): 500 TABLET, FILM COATED ORAL at 13:47

## 2018-10-04 RX ADMIN — MEROPENEM 100 MILLIGRAM(S): 1 INJECTION INTRAVENOUS at 21:24

## 2018-10-04 RX ADMIN — Medication 300 MILLIGRAM(S): at 17:33

## 2018-10-04 RX ADMIN — Medication 650 MILLIGRAM(S): at 11:53

## 2018-10-04 RX ADMIN — Medication 650 MILLIGRAM(S): at 17:37

## 2018-10-04 RX ADMIN — DIPHENHYDRAMINE HYDROCHLORIDE AND LIDOCAINE HYDROCHLORIDE AND ALUMINUM HYDROXIDE AND MAGNESIUM HYDRO 30 MILLILITER(S): KIT at 17:32

## 2018-10-04 RX ADMIN — Medication 650 MILLIGRAM(S): at 11:26

## 2018-10-04 RX ADMIN — MEROPENEM 100 MILLIGRAM(S): 1 INJECTION INTRAVENOUS at 13:47

## 2018-10-04 NOTE — PROGRESS NOTE ADULT - SUBJECTIVE AND OBJECTIVE BOX
Pt seen and examined at bedside. Reports painful oral ulcers limiting PO intake, however he is still trying to eat as much as possible. Does not want liquid diet.        VITAL SIGNS (Last 24 hrs):  T(C): 37.1 (10-03-18 @ 21:40), Max: 39.4 (10-03-18 @ 14:30)  HR: 81 (10-03-18 @ 21:40) (81 - 94)  BP: 93/53 (10-03-18 @ 21:40) (93/53 - 106/66)  RR: 16 (10-03-18 @ 21:40) (16 - 16)  SpO2: --  Wt(kg): --  Daily     Daily     I&O's Summary    03 Oct 2018 07:01  -  04 Oct 2018 07:00  --------------------------------------------------------  IN: 0 mL / OUT: 0 mL / NET: 0 mL     PHYSICAL EXAM:  GENERAL: NAD, acutely ill appearing   HEAD:  Atraumatic, Normocephalic  EYES: EOMI, PERRLA, conjunctiva and sclera clear  Oral: mucositis with oral ulcers   NECK: Supple, No JVD  CHEST/LUNG: Clear to auscultation bilaterally; No wheeze  HEART: Regular rate and rhythm; No murmurs, rubs, or gallops  ABDOMEN: Soft, Nontender, Nondistended; Bowel sounds present  EXTREMITIES:  2+ Peripheral Pulses, No clubbing, cyanosis, or edema  PSYCH: AAOx3  NEUROLOGY: non-focal  SKIN: No rashes or lesions    Labs Reviewed  Spoke to patient in regards to abnormal labs.    CBC Full  -  ( 04 Oct 2018 07:35 )  WBC Count : 0.70 K/uL  Hemoglobin : 10.7 g/dL  Hematocrit : 33.2 %  Platelet Count - Automated : 269 K/uL  Mean Cell Volume : 83.2 fL  Mean Cell Hemoglobin : 26.8 pg  Mean Cell Hemoglobin Concentration : 32.2 g/dL  Auto Neutrophil # : 0.02 K/uL  Auto Lymphocyte # : 0.39 K/uL  Auto Monocyte # : 0.14 K/uL  Auto Eosinophil # : 0.12 K/uL  Auto Basophil # : 0.03 K/uL  Auto Neutrophil % : 2.9 %  Auto Lymphocyte % : 55.7 %  Auto Monocyte % : 20.0 %  Auto Eosinophil % : 17.1 %  Auto Basophil % : 4.3 %    BMP:    10-03 @ 06:50    Blood Urea Nitrogen - 8  Calcium - 8.5  Carbond Dioxide - 24  Chloride - 100  Creatinine - 0.9  Glucose - 117  Potassium - 4.3  Sodium - 137      Hemoglobin A1c -   PT/INR - ( 02 Oct 2018 17:48 )   PT: 15.30 sec;   INR: 1.41 ratio         PTT - ( 02 Oct 2018 17:48 )  PTT:38.2 sec  Urine Culture:         MEDICATIONS  (STANDING):  filgrastim-sndz Injectable 480 MICROGram(s) SubCutaneous daily  FIRST- Mouthwash  BLM 30 milliLiter(s) Swish and Swallow every 12 hours  fluconAZOLE   Tablet 200 milliGRAM(s) Oral daily  meropenem  IVPB 1000 milliGRAM(s) IV Intermittent every 8 hours  meropenem  IVPB      valACYclovir 1000 milliGRAM(s) Oral every 8 hours  vancomycin  IVPB 1500 milliGRAM(s) IV Intermittent every 12 hours    MEDICATIONS  (PRN):  acetaminophen   Tablet .. 650 milliGRAM(s) Oral every 6 hours PRN Temp greater or equal to 38C (100.4F), Mild Pain (1 - 3)  acetaminophen   Tablet .. 650 milliGRAM(s) Oral every 6 hours PRN Moderate Pain (4 - 6), Severe Pain (7 - 10)  ibuprofen  Tablet. 600 milliGRAM(s) Oral every 8 hours PRN Temp greater or equal to 38.5C (101.3F)  ondansetron Injectable 4 milliGRAM(s) IV Push every 6 hours PRN Nausea

## 2018-10-04 NOTE — PROGRESS NOTE ADULT - ASSESSMENT
47 yo male with PMH of cocaine induced neutropenia presents with subjective fevers and mouth sores x 2 weeks. He left AMA last week due to risk of losing employment. He returns as he continues to have oral ulcers and mucositis limiting his ability to eat and subjective fevers.       # Neutropenic Sepsis   - c/w Vanc and desmond   - vanc trough before 4th dose   - c/w diflucan   - c/w valtrex   - ID consult  - heme consult   - c/w neupogen   - check differential on CBC qday    #Chronic Normocytic Anemia   - f/u anemia panel     # Mucositis   - Magic mouthwash with oral care     # Cocaine abuse    #VTE prophylaxis: low risk

## 2018-10-05 LAB
ALBUMIN SERPL ELPH-MCNC: 3 G/DL — LOW (ref 3.5–5.2)
ALP SERPL-CCNC: 52 U/L — SIGNIFICANT CHANGE UP (ref 30–115)
ALT FLD-CCNC: 28 U/L — SIGNIFICANT CHANGE UP (ref 0–41)
ANION GAP SERPL CALC-SCNC: 11 MMOL/L — SIGNIFICANT CHANGE UP (ref 7–14)
AST SERPL-CCNC: 23 U/L — SIGNIFICANT CHANGE UP (ref 0–41)
BASOPHILS # BLD AUTO: 0.03 K/UL — SIGNIFICANT CHANGE UP (ref 0–0.2)
BASOPHILS NFR BLD AUTO: 3.4 % — HIGH (ref 0–1)
BILIRUB SERPL-MCNC: 0.5 MG/DL — SIGNIFICANT CHANGE UP (ref 0.2–1.2)
BUN SERPL-MCNC: 10 MG/DL — SIGNIFICANT CHANGE UP (ref 10–20)
CALCIUM SERPL-MCNC: 8.2 MG/DL — LOW (ref 8.5–10.1)
CHLORIDE SERPL-SCNC: 99 MMOL/L — SIGNIFICANT CHANGE UP (ref 98–110)
CO2 SERPL-SCNC: 25 MMOL/L — SIGNIFICANT CHANGE UP (ref 17–32)
CREAT SERPL-MCNC: 0.7 MG/DL — SIGNIFICANT CHANGE UP (ref 0.7–1.5)
EOSINOPHIL # BLD AUTO: 0.13 K/UL — SIGNIFICANT CHANGE UP (ref 0–0.7)
EOSINOPHIL NFR BLD AUTO: 14.8 % — HIGH (ref 0–8)
FERRITIN SERPL-MCNC: 1787 NG/ML — HIGH (ref 30–400)
FOLATE SERPL-MCNC: 11.4 NG/ML — SIGNIFICANT CHANGE UP
GLUCOSE SERPL-MCNC: 125 MG/DL — HIGH (ref 70–99)
HCT VFR BLD CALC: 32.6 % — LOW (ref 42–52)
HGB BLD-MCNC: 10.3 G/DL — LOW (ref 14–18)
HIV 1+2 AB+HIV1 P24 AG SERPL QL IA: SIGNIFICANT CHANGE UP
IMM GRANULOCYTES NFR BLD AUTO: 1.1 % — HIGH (ref 0.1–0.3)
LYMPHOCYTES # BLD AUTO: 0.52 K/UL — LOW (ref 1.2–3.4)
LYMPHOCYTES # BLD AUTO: 59.1 % — HIGH (ref 20.5–51.1)
MCHC RBC-ENTMCNC: 26.1 PG — LOW (ref 27–31)
MCHC RBC-ENTMCNC: 31.6 G/DL — LOW (ref 32–37)
MCV RBC AUTO: 82.7 FL — SIGNIFICANT CHANGE UP (ref 80–94)
MONOCYTES # BLD AUTO: 0.18 K/UL — SIGNIFICANT CHANGE UP (ref 0.1–0.6)
MONOCYTES NFR BLD AUTO: 20.5 % — HIGH (ref 1.7–9.3)
MRSA PCR RESULT.: NEGATIVE — SIGNIFICANT CHANGE UP
NEUTROPHILS # BLD AUTO: 0.01 K/UL — LOW (ref 1.4–6.5)
NEUTROPHILS NFR BLD AUTO: 1.1 % — LOW (ref 42.2–75.2)
NRBC # BLD: 0 /100 WBCS — SIGNIFICANT CHANGE UP (ref 0–0)
PLATELET # BLD AUTO: 293 K/UL — SIGNIFICANT CHANGE UP (ref 130–400)
POTASSIUM SERPL-MCNC: 4.3 MMOL/L — SIGNIFICANT CHANGE UP (ref 3.5–5)
POTASSIUM SERPL-SCNC: 4.3 MMOL/L — SIGNIFICANT CHANGE UP (ref 3.5–5)
PROT SERPL-MCNC: 6.6 G/DL — SIGNIFICANT CHANGE UP (ref 6–8)
RBC # BLD: 3.94 M/UL — LOW (ref 4.7–6.1)
RBC # FLD: 15.9 % — HIGH (ref 11.5–14.5)
SODIUM SERPL-SCNC: 135 MMOL/L — SIGNIFICANT CHANGE UP (ref 135–146)
VANCOMYCIN TROUGH SERPL-MCNC: 4.7 UG/ML — LOW (ref 5–10)
VIT B12 SERPL-MCNC: 999 PG/ML — SIGNIFICANT CHANGE UP (ref 232–1245)
WBC # BLD: 0.88 K/UL — CRITICAL LOW (ref 4.8–10.8)
WBC # FLD AUTO: 0.88 K/UL — CRITICAL LOW (ref 4.8–10.8)

## 2018-10-05 RX ADMIN — VALACYCLOVIR 1000 MILLIGRAM(S): 500 TABLET, FILM COATED ORAL at 13:56

## 2018-10-05 RX ADMIN — FLUCONAZOLE 200 MILLIGRAM(S): 150 TABLET ORAL at 11:49

## 2018-10-05 RX ADMIN — MEROPENEM 100 MILLIGRAM(S): 1 INJECTION INTRAVENOUS at 13:56

## 2018-10-05 RX ADMIN — SODIUM CHLORIDE 100 MILLILITER(S): 9 INJECTION INTRAMUSCULAR; INTRAVENOUS; SUBCUTANEOUS at 08:12

## 2018-10-05 RX ADMIN — VALACYCLOVIR 1000 MILLIGRAM(S): 500 TABLET, FILM COATED ORAL at 05:29

## 2018-10-05 RX ADMIN — MEROPENEM 100 MILLIGRAM(S): 1 INJECTION INTRAVENOUS at 21:41

## 2018-10-05 RX ADMIN — DIPHENHYDRAMINE HYDROCHLORIDE AND LIDOCAINE HYDROCHLORIDE AND ALUMINUM HYDROXIDE AND MAGNESIUM HYDRO 30 MILLILITER(S): KIT at 18:01

## 2018-10-05 RX ADMIN — Medication 300 MILLIGRAM(S): at 17:57

## 2018-10-05 RX ADMIN — Medication 480 MICROGRAM(S): at 11:50

## 2018-10-05 RX ADMIN — SODIUM CHLORIDE 100 MILLILITER(S): 9 INJECTION INTRAMUSCULAR; INTRAVENOUS; SUBCUTANEOUS at 21:43

## 2018-10-05 RX ADMIN — MEROPENEM 100 MILLIGRAM(S): 1 INJECTION INTRAVENOUS at 05:29

## 2018-10-05 RX ADMIN — VALACYCLOVIR 1000 MILLIGRAM(S): 500 TABLET, FILM COATED ORAL at 21:42

## 2018-10-05 NOTE — PROGRESS NOTE ADULT - SUBJECTIVE AND OBJECTIVE BOX
Pt seen and examined at bedside. Reports no improvement of the mucosal ulcers.        VITAL SIGNS (Last 24 hrs):  T(C): 36.7 (10-05-18 @ 05:41), Max: 39.2 (10-04-18 @ 11:28)  HR: 75 (10-05-18 @ 05:41) (75 - 95)  BP: 100/54 (10-05-18 @ 05:41) (95/64 - 100/54)  RR: 16 (10-05-18 @ 05:41) (16 - 16)  SpO2: --  Wt(kg): --  Daily     Daily     I&O's Summary    04 Oct 2018 07:01  -  05 Oct 2018 07:00  --------------------------------------------------------  IN: 0 mL / OUT: 0 mL / NET: 0 mL      PHYSICAL EXAM:  GENERAL: NAD, nontoxic appearance   HEAD:  Atraumatic, Normocephalic  EYES: EOMI, PERRLA, conjunctiva and sclera clear  Oral: mucositis with oral ulcers   NECK: Supple, No JVD  CHEST/LUNG: Clear to auscultation bilaterally; No wheeze  HEART: Regular rate and rhythm; No murmurs, rubs, or gallops  ABDOMEN: Soft, Nontender, Nondistended; Bowel sounds present  EXTREMITIES:  2+ Peripheral Pulses, No clubbing, cyanosis, or edema  PSYCH: AAOx3  NEUROLOGY: non-focal  SKIN: multiple pustular lesions on lower extremities     Labs Reviewed  Spoke to patient in regards to abnormal labs.    CBC Full  -  ( 05 Oct 2018 07:20 )  WBC Count : 0.88 K/uL  Hemoglobin : 10.3 g/dL  Hematocrit : 32.6 %  Platelet Count - Automated : 293 K/uL  Mean Cell Volume : 82.7 fL  Mean Cell Hemoglobin : 26.1 pg  Mean Cell Hemoglobin Concentration : 31.6 g/dL  Auto Neutrophil # : 0.01 K/uL  Auto Lymphocyte # : 0.52 K/uL  Auto Monocyte # : 0.18 K/uL  Auto Eosinophil # : 0.13 K/uL  Auto Basophil # : 0.03 K/uL  Auto Neutrophil % : 1.1 %  Auto Lymphocyte % : 59.1 %  Auto Monocyte % : 20.5 %  Auto Eosinophil % : 14.8 %  Auto Basophil % : 3.4 %    BMP:    10-05 @ 07:20    Blood Urea Nitrogen - 10  Calcium - 8.2  Carbon Dioxide - 25  Chloride - 99  Creatinine - 0.7  Glucose - 125  Potassium - 4.3  Sodium - 135      Hemoglobin A1c -   PT/INR - ( 02 Oct 2018 17:48 )   PT: 15.30 sec;   INR: 1.41 ratio         PTT - ( 02 Oct 2018 17:48 )  PTT:38.2 sec  Urine Culture:         MEDICATIONS  (STANDING):  filgrastim-sndz Injectable 480 MICROGram(s) SubCutaneous daily  FIRST- Mouthwash  BLM 30 milliLiter(s) Swish and Swallow every 12 hours  fluconAZOLE   Tablet 200 milliGRAM(s) Oral daily  meropenem  IVPB 1000 milliGRAM(s) IV Intermittent every 8 hours  meropenem  IVPB      sodium chloride 0.9%. 1000 milliLiter(s) (100 mL/Hr) IV Continuous <Continuous>  valACYclovir 1000 milliGRAM(s) Oral every 8 hours  vancomycin  IVPB 1500 milliGRAM(s) IV Intermittent every 12 hours    MEDICATIONS  (PRN):  acetaminophen   Tablet .. 650 milliGRAM(s) Oral every 6 hours PRN Temp greater or equal to 38C (100.4F), Mild Pain (1 - 3)  acetaminophen   Tablet .. 650 milliGRAM(s) Oral every 6 hours PRN Moderate Pain (4 - 6), Severe Pain (7 - 10)  ibuprofen  Tablet. 600 milliGRAM(s) Oral every 8 hours PRN Temp greater or equal to 38.5C (101.3F)  ondansetron Injectable 4 milliGRAM(s) IV Push every 6 hours PRN Nausea

## 2018-10-05 NOTE — PROGRESS NOTE ADULT - SUBJECTIVE AND OBJECTIVE BOX
infectious diseases progress note:  ADRIANNA GARZA is a 48yMale patient    NEUTROPENIA;ORAL ULCER    Oral ulcer  Neutropenia      ROS:  CONSTITUTIONAL:  Negative fever or chills, feels well, good appetite  EYES:  Negative  blurry vision or double vision  CARDIOVASCULAR:  Negative for chest pain or palpitations  RESPIRATORY:  Negative for cough, wheezing, or SOB   GASTROINTESTINAL:  Negative for nausea, vomiting, diarrhea, constipation, or abdominal pain  GENITOURINARY:  Negative frequency, urgency or dysuria  NEUROLOGIC:  No headache, confusion, dizziness, lightheadedness    Allergies    No Known Allergies    Intolerances        ANTIBIOTICS/RELEVANT:  antimicrobials  fluconAZOLE   Tablet 200 milliGRAM(s) Oral daily  meropenem  IVPB 1000 milliGRAM(s) IV Intermittent every 8 hours  meropenem  IVPB      valACYclovir 1000 milliGRAM(s) Oral every 8 hours  vancomycin  IVPB 1500 milliGRAM(s) IV Intermittent every 12 hours    immunologic:  filgrastim-sndz Injectable 480 MICROGram(s) SubCutaneous daily    OTHER:  acetaminophen   Tablet .. 650 milliGRAM(s) Oral every 6 hours PRN  acetaminophen   Tablet .. 650 milliGRAM(s) Oral every 6 hours PRN  FIRST- Mouthwash  BLM 30 milliLiter(s) Swish and Swallow every 12 hours  ibuprofen  Tablet. 600 milliGRAM(s) Oral every 8 hours PRN  ondansetron Injectable 4 milliGRAM(s) IV Push every 6 hours PRN  sodium chloride 0.9%. 1000 milliLiter(s) IV Continuous <Continuous>      Objective:  T(F): 98 (10-05-18 @ 05:41), Max: 102.6 (10-04-18 @ 11:28)  HR: 75 (10-05-18 @ 05:41) (75 - 95)  BP: 100/54 (10-05-18 @ 05:41) (95/64 - 100/54)  RR: 16 (10-05-18 @ 05:41) (16 - 16)  SpO2: --    PHYSICAL EXAM  Constitutional:Well-developed, well nourished  Eyes:BUSHRA, EOMI  Ear/Nose/Throat: no oral lesion, no sinus tenderness on percussion	  Neck:no JVD, no lymphadenopathy, supple  Respiratory: CTA vicente  Cardiovascular: S1S2 RRR, no murmurs  Gastrointestinal:soft, (+) BS, no HSM  Extremities:no e/e/c    10-05    135  |  99  |  10  ----------------------------<  125<H>  4.3   |  25  |  0.7      TPro  6.6  /  Alb  3.0<L>  /  TBili  0.5  /  DBili  x   /  AST  23  /  ALT  28  /  AlkPhos  52  10-05                            10.3   0.88  )-----------( 293      ( 05 Oct 2018 07:20 )             32.6

## 2018-10-05 NOTE — CONSULT NOTE ADULT - SUBJECTIVE AND OBJECTIVE BOX
Patient is a 48y old  Male who presents with a chief complaint of Neutopenia (05 Oct 2018 11:23)      HPI:  49yo M presents stating that he was admitted to hospital last week for a WBC of 1.6 and states that he also has sores in his mouth. Pt states that he has not been using drugs for 4 years and after a break up with his girlfriend he began using drugs again and since then he has had decreased po intake. patient denies any IV drug usage. patient states he drinks alcohol and snorts cocaine. Pt denies any CP, SOB, nausea, vomiting. patient states he needs a neupogen injection. patient states that he has had similar episodes in past while abusing drugs. patient had full workup including bone marrow biopsy in 2012. patient refuses further workup at this time. patient denies any fever,chills, weight loss. patient admits to cough . (02 Oct 2018 22:40)       ROS: as above       PAST MEDICAL & SURGICAL HISTORY:  Neutropenia  Illicit drug use  Alcoholism  No significant past surgical history      SOCIAL HISTORY:    FAMILY HISTORY:  No pertinent family history in first degree relatives      MEDICATIONS  (STANDING):  filgrastim-sndz Injectable 480 MICROGram(s) SubCutaneous daily  FIRST- Mouthwash  BLM 30 milliLiter(s) Swish and Swallow every 12 hours  fluconAZOLE   Tablet 200 milliGRAM(s) Oral daily  meropenem  IVPB 1000 milliGRAM(s) IV Intermittent every 8 hours  meropenem  IVPB      sodium chloride 0.9%. 1000 milliLiter(s) (100 mL/Hr) IV Continuous <Continuous>  valACYclovir 1000 milliGRAM(s) Oral every 8 hours  vancomycin  IVPB 1500 milliGRAM(s) IV Intermittent every 12 hours    MEDICATIONS  (PRN):  acetaminophen   Tablet .. 650 milliGRAM(s) Oral every 6 hours PRN Temp greater or equal to 38C (100.4F), Mild Pain (1 - 3)  acetaminophen   Tablet .. 650 milliGRAM(s) Oral every 6 hours PRN Moderate Pain (4 - 6), Severe Pain (7 - 10)  ibuprofen  Tablet. 600 milliGRAM(s) Oral every 8 hours PRN Temp greater or equal to 38.5C (101.3F)  ondansetron Injectable 4 milliGRAM(s) IV Push every 6 hours PRN Nausea      Allergies    No Known Allergies    Intolerances        Vital Signs Last 24 Hrs  T(C): 36.9 (05 Oct 2018 14:00), Max: 36.9 (05 Oct 2018 14:00)  T(F): 98.5 (05 Oct 2018 14:00), Max: 98.5 (05 Oct 2018 14:00)  HR: 88 (05 Oct 2018 14:00) (75 - 88)  BP: 116/73 (05 Oct 2018 14:00) (100/54 - 116/73)  BP(mean): --  RR: 16 (05 Oct 2018 14:00) (16 - 16)  SpO2: --    PHYSICAL EXAM  General: adult in NAD  HEENT: clear oropharynx, anicteric sclera, pink conjunctiva  Neck: supple  CV: normal S1/S2 with no murmur rubs or gallops  Lungs: positive air movement b/l ant lungs,clear to auscultation, no wheezes, no rales  Abdomen: soft non-tender non-distended, no hepatosplenomegaly  Ext: no clubbing cyanosis or edema  Skin: no rashes and no petechiae  Neuro: alert and oriented X 4, no focal deficits      LABS:                          10.3   0.88  )-----------( 293      ( 05 Oct 2018 07:20 )             32.6         Mean Cell Volume : 82.7 fL  Mean Cell Hemoglobin : 26.1 pg  Mean Cell Hemoglobin Concentration : 31.6 g/dL  Auto Neutrophil # : 0.01 K/uL  Auto Lymphocyte # : 0.52 K/uL  Auto Monocyte # : 0.18 K/uL  Auto Eosinophil # : 0.13 K/uL  Auto Basophil # : 0.03 K/uL  Auto Neutrophil % : 1.1 %  Auto Lymphocyte % : 59.1 %  Auto Monocyte % : 20.5 %  Auto Eosinophil % : 14.8 %  Auto Basophil % : 3.4 %      Serial CBC's  10-05 @ 07:20  Hct-32.6 / Hgb-10.3 / Plat-293 / RBC-3.94 / WBC-0.88  Serial CBC's  10-04 @ 07:35  Hct-33.2 / Hgb-10.7 / Plat-269 / RBC-3.99 / WBC-0.70  Serial CBC's  10-03 @ 06:50  Hct-33.4 / Hgb-10.7 / Plat-251 / RBC-4.08 / WBC-1.15  Serial CBC's  10-02 @ 17:48  Hct-33.6 / Hgb-10.8 / Plat-271 / RBC-4.13 / WBC-1.80      10-05    135  |  99  |  10  ----------------------------<  125<H>  4.3   |  25  |  0.7    Ca    8.2<L>      05 Oct 2018 07:20    TPro  6.6  /  Alb  3.0<L>  /  TBili  0.5  /  DBili  x   /  AST  23  /  ALT  28  /  AlkPhos  52  10-05          Iron - Total Binding Capacity.: 155 ug/dL (10-04 @ 07:35)  Ferritin, Serum: 1787 ng/mL (10-04 @ 07:35)  Vitamin B12, Serum: 999 pg/mL (10-04 @ 07:35)  Folate, Serum: 11.4 ng/mL (10-04 @ 07:35)  Reticulocyte Percent: 0.5 % (10-04 @ 07:35)              RADIOLOGY & ADDITIONAL STUDIES: 47yo male was readmitted for neutropenia, fever, mouth sore and cutaneous abscess. He admitted that he sniffed and inhaled cocaine 6 to 7 days ago. He usually started having neutropenia and related symptoms a week after he used cocaine.  Since this admission, he has been given Neupogen injection daily for 4 days but his WBC and ANC are still very low. e has been given broad spectrum iv ABx. His fever is subsided.    The patient has had similar episodes in past while abusing cocaine. He had bone marrow biopsy in 2012 and was unremarkable. He was good for 4 years because he quitted cocaine. However, he went back cocaine abuse recently.      ROS: as above     PAST MEDICAL & SURGICAL HISTORY:  Neutropenia  Illicit drug use  Alcoholism  No significant past surgical history    SOCIAL HISTORY:    FAMILY HISTORY:  No pertinent family history in first degree relatives    MEDICATIONS  (STANDING):  filgrastim-sndz Injectable 480 MICROGram(s) SubCutaneous daily  FIRST- Mouthwash  BLM 30 milliLiter(s) Swish and Swallow every 12 hours  fluconAZOLE   Tablet 200 milliGRAM(s) Oral daily  meropenem  IVPB 1000 milliGRAM(s) IV Intermittent every 8 hours  meropenem  IVPB      sodium chloride 0.9%. 1000 milliLiter(s) (100 mL/Hr) IV Continuous <Continuous>  valACYclovir 1000 milliGRAM(s) Oral every 8 hours  vancomycin  IVPB 1500 milliGRAM(s) IV Intermittent every 12 hours    MEDICATIONS  (PRN):  acetaminophen   Tablet .. 650 milliGRAM(s) Oral every 6 hours PRN Temp greater or equal to 38C (100.4F), Mild Pain (1 - 3)  acetaminophen   Tablet .. 650 milliGRAM(s) Oral every 6 hours PRN Moderate Pain (4 - 6), Severe Pain (7 - 10)  ibuprofen  Tablet. 600 milliGRAM(s) Oral every 8 hours PRN Temp greater or equal to 38.5C (101.3F)  ondansetron Injectable 4 milliGRAM(s) IV Push every 6 hours PRN Nausea    Allergies    No Known Allergies    Intolerances    Vital Signs Last 24 Hrs  T(C): 36.9 (05 Oct 2018 14:00), Max: 36.9 (05 Oct 2018 14:00)  T(F): 98.5 (05 Oct 2018 14:00), Max: 98.5 (05 Oct 2018 14:00)  HR: 88 (05 Oct 2018 14:00) (75 - 88)  BP: 116/73 (05 Oct 2018 14:00) (100/54 - 116/73)  BP(mean): --  RR: 16 (05 Oct 2018 14:00) (16 - 16)  SpO2: --    PHYSICAL EXAM  GENERAL: NAD, nontoxic appearance   HEAD:  Atraumatic, Normocephalic  EYES: EOMI, PERRLA, conjunctiva and sclera clear  Oral: mucosal ulcers   NECK: Supple, No JVD  CHEST/LUNG: Clear to auscultation bilaterally; No wheeze  HEART: Regular rate and rhythm; No murmurs, rubs, or gallops  ABDOMEN: Soft, Nontender, Nondistended; Bowel sounds present  EXTREMITIES:  2+ Peripheral Pulses, No clubbing, cyanosis, or edema  PSYCH: AAOx3  NEUROLOGY: non-focal  SKIN: multiple pustular lesions on lower extremities     LABS:                          10.3   0.88  )-----------( 293      ( 05 Oct 2018 07:20 )             32.6         Mean Cell Volume : 82.7 fL  Mean Cell Hemoglobin : 26.1 pg  Mean Cell Hemoglobin Concentration : 31.6 g/dL  Auto Neutrophil # : 0.01 K/uL  Auto Lymphocyte # : 0.52 K/uL  Auto Monocyte # : 0.18 K/uL  Auto Eosinophil # : 0.13 K/uL  Auto Basophil # : 0.03 K/uL  Auto Neutrophil % : 1.1 %  Auto Lymphocyte % : 59.1 %  Auto Monocyte % : 20.5 %  Auto Eosinophil % : 14.8 %  Auto Basophil % : 3.4 %      Serial CBC's  10-05 @ 07:20  Hct-32.6 / Hgb-10.3 / Plat-293 / RBC-3.94 / WBC-0.88  Serial CBC's  10-04 @ 07:35  Hct-33.2 / Hgb-10.7 / Plat-269 / RBC-3.99 / WBC-0.70  Serial CBC's  10-03 @ 06:50  Hct-33.4 / Hgb-10.7 / Plat-251 / RBC-4.08 / WBC-1.15  Serial CBC's  10-02 @ 17:48  Hct-33.6 / Hgb-10.8 / Plat-271 / RBC-4.13 / WBC-1.80      10-05    135  |  99  |  10  ----------------------------<  125<H>  4.3   |  25  |  0.7    Ca    8.2<L>      05 Oct 2018 07:20    TPro  6.6  /  Alb  3.0<L>  /  TBili  0.5  /  DBili  x   /  AST  23  /  ALT  28  /  AlkPhos  52  10-05          Iron - Total Binding Capacity.: 155 ug/dL (10-04 @ 07:35)  Ferritin, Serum: 1787 ng/mL (10-04 @ 07:35)  Vitamin B12, Serum: 999 pg/mL (10-04 @ 07:35)  Folate, Serum: 11.4 ng/mL (10-04 @ 07:35)  Reticulocyte Percent: 0.5 % (10-04 @ 07:35)              RADIOLOGY & ADDITIONAL STUDIES:

## 2018-10-05 NOTE — PROGRESS NOTE ADULT - ASSESSMENT
Levamisole mixed with cocaine has been associated with neutropenia (typically improves with neupogen)   https://www.ncbi.nlm.nih.gov/pmc/articles/VVE5746507/    Pt has not improved  9/26 B/C x2 negative  10/3 B/C x1 pending    Tmax 102.6    On fluconAZOLE   Tablet 200 milliGRAM(s) Oral daily  meropenem  IVPB 1000 milliGRAM(s) IV Intermittent every 8 hours  valACYclovir 1000 milliGRAM(s) Oral every 8 hours  vancomycin  IVPB 1500 milliGRAM(s) IV Intermittent every 12 hours    PLAN  Await hematology evaluation  Blood cultures x2  Continue current antibiotics  Vanco Trough  Nasal MRSA PCR

## 2018-10-05 NOTE — PROGRESS NOTE ADULT - ASSESSMENT
49 yo male with PMH of cocaine induced neutropenia presents with subjective fevers and mouth sores x 2 weeks. He left AMA last week due to risk of losing employment. He returns as he continues to have oral ulcers and mucositis limiting his ability to eat and subjective fevers.       # Neutropenic Sepsis   - originally his neutropenia was believed to be secondary to cocaine, however given persistent neutropenia and no improvement with G-CSF, he could have an underlying malignancy or aplastic anemia. Possibly Sweet's syndrome, given his skin and mucosal lesions.   - c/w Vanc and desmond   - vanc trough before 4th dose   - c/w diflucan   - c/w valtrex   - ID consult appreciated   - heme consult - pending   - c/w Neupogen   - check differential on CBC qday    #Chronic Normocytic Anemia     # Mucositis   - Magic mouthwash with oral care     # Cocaine abuse    #VTE prophylaxis: low risk

## 2018-10-05 NOTE — CONSULT NOTE ADULT - ASSESSMENT
Neutropenic fever, mouth sore and ulcers, and pustular cutaneous lesions, most likely due to cocaine abuse.    Recommendation:  -- Continue iv ABx.  -- Neupogen 480 mcg sc daily.  -- Monitor CBC daily.  -- Discussed BM bx with the patient. He declined.
Patient is a 48y old  Male  with PMH of cocaine induced neutropenia presents with subjective fevers and mouth sores x 2 weeks. He left AMA last week due to risk of losing employment. He returns as he continues to have oral ulcers and mucositis limiting his ability to eat and subjective fevers.     # Neutropenic fever  - cultures are negative to date  # Herpes Labialis  # ?Cocaine abuse    would recommend:  1. Change Cefepime to Meropenem since still spiking fever on Cefepime  2. Continue Vancomycin and Keep level between 15 to 20  3. Add Valtex for Herpes labialis  4. OBTAIN HIV Test  5. Monitor Temp. and c/w supportive care  6. Continue Neupogen and monitor neutrophil count  7. Hem/Onc evaluation    d/w patient and Nursing staff    will follow the patient with you and make further recommendation based on the clinical course and Lab results  Thank you for the opportunity to participate in Mr. GARZA's care

## 2018-10-06 LAB
BASOPHILS # BLD AUTO: 0.04 K/UL — SIGNIFICANT CHANGE UP (ref 0–0.2)
BASOPHILS NFR BLD AUTO: 2.7 % — HIGH (ref 0–1)
EOSINOPHIL # BLD AUTO: 0.11 K/UL — SIGNIFICANT CHANGE UP (ref 0–0.7)
EOSINOPHIL NFR BLD AUTO: 7.5 % — SIGNIFICANT CHANGE UP (ref 0–8)
HCT VFR BLD CALC: 32.9 % — LOW (ref 42–52)
HGB BLD-MCNC: 10.5 G/DL — LOW (ref 14–18)
IMM GRANULOCYTES NFR BLD AUTO: 0.7 % — HIGH (ref 0.1–0.3)
LDH SERPL L TO P-CCNC: 141 U/L — SIGNIFICANT CHANGE UP (ref 50–242)
LYMPHOCYTES # BLD AUTO: 0.83 K/UL — LOW (ref 1.2–3.4)
LYMPHOCYTES # BLD AUTO: 56.8 % — HIGH (ref 20.5–51.1)
MCHC RBC-ENTMCNC: 26.3 PG — LOW (ref 27–31)
MCHC RBC-ENTMCNC: 31.9 G/DL — LOW (ref 32–37)
MCV RBC AUTO: 82.5 FL — SIGNIFICANT CHANGE UP (ref 80–94)
MONOCYTES # BLD AUTO: 0.45 K/UL — SIGNIFICANT CHANGE UP (ref 0.1–0.6)
MONOCYTES NFR BLD AUTO: 30.8 % — HIGH (ref 1.7–9.3)
NEUTROPHILS # BLD AUTO: 0.02 K/UL — LOW (ref 1.4–6.5)
NEUTROPHILS NFR BLD AUTO: 1.5 % — LOW (ref 42.2–75.2)
NRBC # BLD: 0 /100 WBCS — SIGNIFICANT CHANGE UP (ref 0–0)
PLATELET # BLD AUTO: 357 K/UL — SIGNIFICANT CHANGE UP (ref 130–400)
RBC # BLD: 3.99 M/UL — LOW (ref 4.7–6.1)
RBC # FLD: 16 % — HIGH (ref 11.5–14.5)
WBC # BLD: 1.46 K/UL — LOW (ref 4.8–10.8)
WBC # FLD AUTO: 1.46 K/UL — LOW (ref 4.8–10.8)

## 2018-10-06 RX ADMIN — DIPHENHYDRAMINE HYDROCHLORIDE AND LIDOCAINE HYDROCHLORIDE AND ALUMINUM HYDROXIDE AND MAGNESIUM HYDRO 30 MILLILITER(S): KIT at 17:38

## 2018-10-06 RX ADMIN — SODIUM CHLORIDE 100 MILLILITER(S): 9 INJECTION INTRAMUSCULAR; INTRAVENOUS; SUBCUTANEOUS at 14:17

## 2018-10-06 RX ADMIN — MEROPENEM 100 MILLIGRAM(S): 1 INJECTION INTRAVENOUS at 21:37

## 2018-10-06 RX ADMIN — MEROPENEM 100 MILLIGRAM(S): 1 INJECTION INTRAVENOUS at 14:17

## 2018-10-06 RX ADMIN — Medication 300 MILLIGRAM(S): at 17:38

## 2018-10-06 RX ADMIN — DIPHENHYDRAMINE HYDROCHLORIDE AND LIDOCAINE HYDROCHLORIDE AND ALUMINUM HYDROXIDE AND MAGNESIUM HYDRO 30 MILLILITER(S): KIT at 05:33

## 2018-10-06 RX ADMIN — VALACYCLOVIR 1000 MILLIGRAM(S): 500 TABLET, FILM COATED ORAL at 21:35

## 2018-10-06 RX ADMIN — VALACYCLOVIR 1000 MILLIGRAM(S): 500 TABLET, FILM COATED ORAL at 05:34

## 2018-10-06 RX ADMIN — Medication 480 MICROGRAM(S): at 11:11

## 2018-10-06 RX ADMIN — FLUCONAZOLE 200 MILLIGRAM(S): 150 TABLET ORAL at 11:11

## 2018-10-06 RX ADMIN — VALACYCLOVIR 1000 MILLIGRAM(S): 500 TABLET, FILM COATED ORAL at 14:16

## 2018-10-06 RX ADMIN — MEROPENEM 100 MILLIGRAM(S): 1 INJECTION INTRAVENOUS at 05:34

## 2018-10-06 RX ADMIN — Medication 300 MILLIGRAM(S): at 05:34

## 2018-10-06 NOTE — PROGRESS NOTE ADULT - ASSESSMENT
47 yo male with PMH of cocaine induced neutropenia presents with subjective fevers and mouth sores x 2 weeks. He left AMA last week due to risk of losing employment. He returns as he continues to have oral ulcers and mucositis limiting his ability to eat and subjective fevers.       # Neutropenic Sepsis   # Neutropenia due to cocaine   - c/w Vanc and desmond   - c/w diflucan   - c/w valtrex   - ID consult appreciated   - heme consult noted - patient refused BM biopsy   - c/w Neupogen   - check differential on CBC qday    #Chronic Normocytic Anemia     # Mucositis   - Magic mouthwash with oral care     # Cocaine abuse    #VTE prophylaxis: low risk

## 2018-10-06 NOTE — PROGRESS NOTE ADULT - SUBJECTIVE AND OBJECTIVE BOX
GREGADRIANNA WU  48y, Male      OVERNIGHT EVENTS:  no acute events overnight    ROS negative except as per above    VITALS:  T(F): 99, Max: 99 (10-06-18 @ 14:51)  HR: 102  BP: 122/60  RR: 16Vital Signs Last 24 Hrs  T(C): 37.2 (06 Oct 2018 14:51), Max: 37.2 (06 Oct 2018 14:51)  T(F): 99 (06 Oct 2018 14:51), Max: 99 (06 Oct 2018 14:51)  HR: 102 (06 Oct 2018 14:51) (89 - 107)  BP: 122/60 (06 Oct 2018 14:51) (110/56 - 122/60)  BP(mean): --  RR: 16 (06 Oct 2018 14:51) (16 - 16)  SpO2: --    PHYSICAL EXAM  Gen: Awake and alert, non-toxic appearing, NAD  HEENT: NCAT. EOMI. MMM. oral aphthous ulcer   CV: RRR, no murmurs  Lungs: CTAB, no w/r/r  Abd: Soft. NTND  Extr: wwp, no edema  Skin: pustular healing lesions groin and upper thigh  Neuro: No focal deficits  Lines: clean      TESTS & MEASUREMENTS:                        10.5   1.46  )-----------( 357      ( 06 Oct 2018 07:27 )             32.9     10-05    135  |  99  |  10  ----------------------------<  125<H>  4.3   |  25  |  0.7    Ca    8.2<L>      05 Oct 2018 07:20    TPro  6.6  /  Alb  3.0<L>  /  TBili  0.5  /  DBili  x   /  AST  23  /  ALT  28  /  AlkPhos  52  10-05    LIVER FUNCTIONS - ( 05 Oct 2018 07:20 )  Alb: 3.0 g/dL / Pro: 6.6 g/dL / ALK PHOS: 52 U/L / ALT: 28 U/L / AST: 23 U/L / GGT: x             Culture - Blood (collected 10-03-18 @ 21:25)  Source: .Blood None  Preliminary Report (10-05-18 @ 18:02):    No growth to date.          RADIOLOGY & ADDITIONAL TESTS:    ANTIBIOTICS:    cefepime   IVPB   100 mL/Hr IV Intermittent (10-03-18 @ 18:47)    fluconAZOLE   Tablet   100 milliGRAM(s) Oral (10-02-18 @ 19:37)    fluconAZOLE   Tablet   200 milliGRAM(s) Oral (10-06-18 @ 11:11)   200 milliGRAM(s) Oral (10-05-18 @ 11:49)   200 milliGRAM(s) Oral (10-04-18 @ 11:17)   200 milliGRAM(s) Oral (10-03-18 @ 11:34)    meropenem  IVPB   100 mL/Hr IV Intermittent (10-04-18 @ 01:08)    meropenem  IVPB   100 mL/Hr IV Intermittent (10-06-18 @ 14:17)   100 mL/Hr IV Intermittent (10-06-18 @ 05:34)   100 mL/Hr IV Intermittent (10-05-18 @ 21:41)   100 mL/Hr IV Intermittent (10-05-18 @ 13:56)   100 mL/Hr IV Intermittent (10-05-18 @ 05:29)   100 mL/Hr IV Intermittent (10-04-18 @ 21:24)   100 mL/Hr IV Intermittent (10-04-18 @ 13:47)   100 mL/Hr IV Intermittent (10-04-18 @ 05:32)    valACYclovir   1000 milliGRAM(s) Oral (10-06-18 @ 14:16)   1000 milliGRAM(s) Oral (10-06-18 @ 05:34)   1000 milliGRAM(s) Oral (10-05-18 @ 21:42)   1000 milliGRAM(s) Oral (10-05-18 @ 13:56)   1000 milliGRAM(s) Oral (10-05-18 @ 05:29)   1000 milliGRAM(s) Oral (10-04-18 @ 21:24)   1000 milliGRAM(s) Oral (10-04-18 @ 13:47)   1000 milliGRAM(s) Oral (10-04-18 @ 05:33)   1000 milliGRAM(s) Oral (10-04-18 @ 01:07)    vancomycin  IVPB   500 mL/Hr IV Intermittent (10-03-18 @ 18:47)    vancomycin  IVPB   300 mL/Hr IV Intermittent (10-06-18 @ 05:34)   300 mL/Hr IV Intermittent (10-05-18 @ 17:57)   300 mL/Hr IV Intermittent (10-04-18 @ 17:33)   300 mL/Hr IV Intermittent (10-04-18 @ 06:36)        fluconAZOLE   Tablet 200 milliGRAM(s) Oral daily  meropenem  IVPB 1000 milliGRAM(s) IV Intermittent every 8 hours  meropenem  IVPB      valACYclovir 1000 milliGRAM(s) Oral every 8 hours  vancomycin  IVPB 1500 milliGRAM(s) IV Intermittent every 12 hours

## 2018-10-06 NOTE — PROGRESS NOTE ADULT - ASSESSMENT
Levamisole mixed with cocaine has been associated with neutropenia (typically improves with neupogen)   https://www.ncbi.nlm.nih.gov/pmc/articles/AXS4455478/    9/26 B/C x2 negative  10/3 B/C x1 pending    Tmax 102.6    On fluconAZOLE   Tablet 200 milliGRAM(s) Oral daily  meropenem  IVPB 1000 milliGRAM(s) IV Intermittent every 8 hours  valACYclovir 1000 milliGRAM(s) Oral every 8 hours  vancomycin  IVPB 1500 milliGRAM(s) IV Intermittent every 12 hours    PLAN  D/C vanc  Would also consider Behcets as etiology of recurrent aphthous ulcers, pustular skin lesions, neutropenia. Would need + pathergy test to confirm (though pt denies any of these sx prior to snorting cocaine)  Send esr/crp  Continue desmond/fluc/valtrex

## 2018-10-06 NOTE — PROGRESS NOTE ADULT - SUBJECTIVE AND OBJECTIVE BOX
Pt seen and examined at bedside. Reports some improvement of the mucosal ulcers.      VITAL SIGNS (Last 24 hrs):  T(C): 36.2 (10-06-18 @ 05:00), Max: 36.9 (10-05-18 @ 14:00)  HR: 107 (10-06-18 @ 05:00) (88 - 107)  BP: 119/71 (10-06-18 @ 05:00) (110/56 - 119/71)  RR: 16 (10-06-18 @ 05:00) (16 - 16)  SpO2: --  Wt(kg): --  Daily     Daily     I&O's Summary     PHYSICAL EXAM:  GENERAL: NAD, nontoxic appearance   HEAD:  Atraumatic, Normocephalic  EYES: EOMI, PERRLA, conjunctiva and sclera clear  Oral: mucositis with oral ulcers   NECK: Supple, No JVD  CHEST/LUNG: Clear to auscultation bilaterally; No wheeze  HEART: Regular rate and rhythm; No murmurs, rubs, or gallops  ABDOMEN: Soft, Nontender, Nondistended; Bowel sounds present  EXTREMITIES:  2+ Peripheral Pulses, No clubbing, cyanosis, or edema  PSYCH: AAOx3  NEUROLOGY: non-focal  SKIN: multiple pustular lesions on lower extremities     Labs Reviewed  Spoke to patient in regards to abnormal labs.    CBC Full  -  ( 06 Oct 2018 07:27 )  WBC Count : 1.46 K/uL  Hemoglobin : 10.5 g/dL  Hematocrit : 32.9 %  Platelet Count - Automated : 357 K/uL  Mean Cell Volume : 82.5 fL  Mean Cell Hemoglobin : 26.3 pg  Mean Cell Hemoglobin Concentration : 31.9 g/dL  Auto Neutrophil # : 0.02 K/uL  Auto Lymphocyte # : 0.83 K/uL  Auto Monocyte # : 0.45 K/uL  Auto Eosinophil # : 0.11 K/uL  Auto Basophil # : 0.04 K/uL  Auto Neutrophil % : 1.5 %  Auto Lymphocyte % : 56.8 %  Auto Monocyte % : 30.8 %  Auto Eosinophil % : 7.5 %  Auto Basophil % : 2.7 %    BMP:    10-05 @ 07:20    Blood Urea Nitrogen - 10  Calcium - 8.2  Carbond Dioxide - 25  Chloride - 99  Creatinine - 0.7  Glucose - 125  Potassium - 4.3  Sodium - 135      Hemoglobin A1c -   PT/INR - ( 02 Oct 2018 17:48 )   PT: 15.30 sec;   INR: 1.41 ratio         PTT - ( 02 Oct 2018 17:48 )  PTT:38.2 sec  Urine Culture:  10-03 @ 21:25 Urine culture: --    Culture Results:   No growth to date.  Method Type: --  Organism: --  Organism Identification: --  Specimen Source: .Blood None         MEDICATIONS  (STANDING):  filgrastim-sndz Injectable 480 MICROGram(s) SubCutaneous daily  FIRST- Mouthwash  BLM 30 milliLiter(s) Swish and Swallow every 12 hours  fluconAZOLE   Tablet 200 milliGRAM(s) Oral daily  meropenem  IVPB 1000 milliGRAM(s) IV Intermittent every 8 hours  meropenem  IVPB      sodium chloride 0.9%. 1000 milliLiter(s) (100 mL/Hr) IV Continuous <Continuous>  valACYclovir 1000 milliGRAM(s) Oral every 8 hours  vancomycin  IVPB 1500 milliGRAM(s) IV Intermittent every 12 hours    MEDICATIONS  (PRN):  acetaminophen   Tablet .. 650 milliGRAM(s) Oral every 6 hours PRN Temp greater or equal to 38C (100.4F), Mild Pain (1 - 3)  acetaminophen   Tablet .. 650 milliGRAM(s) Oral every 6 hours PRN Moderate Pain (4 - 6), Severe Pain (7 - 10)  ibuprofen  Tablet. 600 milliGRAM(s) Oral every 8 hours PRN Temp greater or equal to 38.5C (101.3F)  ondansetron Injectable 4 milliGRAM(s) IV Push every 6 hours PRN Nausea

## 2018-10-07 LAB
BASOPHILS # BLD AUTO: 0.04 K/UL — SIGNIFICANT CHANGE UP (ref 0–0.2)
BASOPHILS NFR BLD AUTO: 1.5 % — HIGH (ref 0–1)
EOSINOPHIL # BLD AUTO: 0.06 K/UL — SIGNIFICANT CHANGE UP (ref 0–0.7)
EOSINOPHIL NFR BLD AUTO: 2.2 % — SIGNIFICANT CHANGE UP (ref 0–8)
HAPTOGLOB SERPL-MCNC: 367 MG/DL — HIGH (ref 34–200)
HCT VFR BLD CALC: 33.4 % — LOW (ref 42–52)
HGB BLD-MCNC: 10.6 G/DL — LOW (ref 14–18)
IMM GRANULOCYTES NFR BLD AUTO: 4.1 % — HIGH (ref 0.1–0.3)
LYMPHOCYTES # BLD AUTO: 1.15 K/UL — LOW (ref 1.2–3.4)
LYMPHOCYTES # BLD AUTO: 42.4 % — SIGNIFICANT CHANGE UP (ref 20.5–51.1)
MCHC RBC-ENTMCNC: 26.5 PG — LOW (ref 27–31)
MCHC RBC-ENTMCNC: 31.7 G/DL — LOW (ref 32–37)
MCV RBC AUTO: 83.5 FL — SIGNIFICANT CHANGE UP (ref 80–94)
MONOCYTES # BLD AUTO: 0.93 K/UL — HIGH (ref 0.1–0.6)
MONOCYTES NFR BLD AUTO: 34.3 % — HIGH (ref 1.7–9.3)
NEUTROPHILS # BLD AUTO: 0.42 K/UL — LOW (ref 1.4–6.5)
NEUTROPHILS NFR BLD AUTO: 15.5 % — LOW (ref 42.2–75.2)
NRBC # BLD: 2 /100 WBCS — HIGH (ref 0–0)
PLATELET # BLD AUTO: 331 K/UL — SIGNIFICANT CHANGE UP (ref 130–400)
RBC # BLD: 4 M/UL — LOW (ref 4.7–6.1)
RBC # FLD: 16 % — HIGH (ref 11.5–14.5)
WBC # BLD: 2.71 K/UL — LOW (ref 4.8–10.8)
WBC # FLD AUTO: 2.71 K/UL — LOW (ref 4.8–10.8)

## 2018-10-07 RX ADMIN — VALACYCLOVIR 1000 MILLIGRAM(S): 500 TABLET, FILM COATED ORAL at 21:59

## 2018-10-07 RX ADMIN — MEROPENEM 100 MILLIGRAM(S): 1 INJECTION INTRAVENOUS at 05:31

## 2018-10-07 RX ADMIN — VALACYCLOVIR 1000 MILLIGRAM(S): 500 TABLET, FILM COATED ORAL at 13:23

## 2018-10-07 RX ADMIN — Medication 650 MILLIGRAM(S): at 17:00

## 2018-10-07 RX ADMIN — DIPHENHYDRAMINE HYDROCHLORIDE AND LIDOCAINE HYDROCHLORIDE AND ALUMINUM HYDROXIDE AND MAGNESIUM HYDRO 30 MILLILITER(S): KIT at 05:32

## 2018-10-07 RX ADMIN — Medication 480 MICROGRAM(S): at 12:02

## 2018-10-07 RX ADMIN — DIPHENHYDRAMINE HYDROCHLORIDE AND LIDOCAINE HYDROCHLORIDE AND ALUMINUM HYDROXIDE AND MAGNESIUM HYDRO 30 MILLILITER(S): KIT at 17:39

## 2018-10-07 RX ADMIN — Medication 650 MILLIGRAM(S): at 05:31

## 2018-10-07 RX ADMIN — MEROPENEM 100 MILLIGRAM(S): 1 INJECTION INTRAVENOUS at 21:55

## 2018-10-07 RX ADMIN — MEROPENEM 100 MILLIGRAM(S): 1 INJECTION INTRAVENOUS at 13:23

## 2018-10-07 RX ADMIN — FLUCONAZOLE 200 MILLIGRAM(S): 150 TABLET ORAL at 11:37

## 2018-10-07 RX ADMIN — VALACYCLOVIR 1000 MILLIGRAM(S): 500 TABLET, FILM COATED ORAL at 05:33

## 2018-10-07 NOTE — PROGRESS NOTE ADULT - ASSESSMENT
49 yo male with PMH of cocaine induced neutropenia presents with subjective fevers and mouth sores x 2 weeks. He left AMA last week due to risk of losing employment. He returns as he continues to have oral ulcers and mucositis limiting his ability to eat and subjective fevers.       # Neutropenic Sepsis   # Neutropenia due to cocaine   - c/w desmond   - c/w diflucan   - c/w valtrex   - ID consult appreciated   - heme consult noted - patient refused BM biopsy   - c/w Neupogen   - check differential on CBC qday    #Chronic Normocytic Anemia     # Mucositis   - Magic mouthwash with oral care     # Cocaine abuse    #VTE prophylaxis: low risk

## 2018-10-07 NOTE — PROGRESS NOTE ADULT - SUBJECTIVE AND OBJECTIVE BOX
Pt seen and examined at bedside. Reports some improvement of the mucosal ulcers.      VITAL SIGNS (Last 24 hrs):  T(C): 37.9 (10-07-18 @ 06:00), Max: 38 (10-06-18 @ 21:25)  HR: 83 (10-07-18 @ 06:00) (83 - 102)  BP: 117/68 (10-07-18 @ 06:00) (116/73 - 122/60)  RR: 16 (10-07-18 @ 06:00) (16 - 16)  SpO2: --  Wt(kg): --  Daily     Daily     I&O's Summary      PHYSICAL EXAM:  GENERAL: NAD, nontoxic appearance   HEAD:  Atraumatic, Normocephalic  EYES: EOMI, PERRLA, conjunctiva and sclera clear  Oral: mucositis with oral ulcers   NECK: Supple, No JVD  CHEST/LUNG: Clear to auscultation bilaterally; No wheeze  HEART: Regular rate and rhythm; No murmurs, rubs, or gallops  ABDOMEN: Soft, Nontender, Nondistended; Bowel sounds present  EXTREMITIES:  2+ Peripheral Pulses, No clubbing, cyanosis, or edema  PSYCH: AAOx3  NEUROLOGY: non-focal  SKIN: multiple pustular lesions on lower extremities        Labs Reviewed  Spoke to patient in regards to abnormal labs.    CBC Full  -  ( 07 Oct 2018 06:27 )  WBC Count : 2.71 K/uL  Hemoglobin : 10.6 g/dL  Hematocrit : 33.4 %  Platelet Count - Automated : 331 K/uL  Mean Cell Volume : 83.5 fL  Mean Cell Hemoglobin : 26.5 pg  Mean Cell Hemoglobin Concentration : 31.7 g/dL  Auto Neutrophil # : 0.42 K/uL  Auto Lymphocyte # : 1.15 K/uL  Auto Monocyte # : 0.93 K/uL  Auto Eosinophil # : 0.06 K/uL  Auto Basophil # : 0.04 K/uL  Auto Neutrophil % : 15.5 %  Auto Lymphocyte % : 42.4 %  Auto Monocyte % : 34.3 %  Auto Eosinophil % : 2.2 %  Auto Basophil % : 1.5 %    BMP:    10-05 @ 07:20    Blood Urea Nitrogen - 10  Calcium - 8.2  Carbon Dioxide - 25  Chloride - 99  Creatinine - 0.7  Glucose - 125  Potassium - 4.3  Sodium - 135         Urine Culture:  10-05 @ 12:06 Urine culture: --    Culture Results:   No growth to date.  Method Type: --  Organism: --  Organism Identification: --  Specimen Source: .Blood Blood-Venous  10-03 @ 21:25 Urine culture: --    Culture Results:   No growth to date.  Method Type: --  Organism: --  Organism Identification: --  Specimen Source: .Blood None       MEDICATIONS  (STANDING):  FIRST- Mouthwash  BLM 30 milliLiter(s) Swish and Swallow every 12 hours  fluconAZOLE   Tablet 200 milliGRAM(s) Oral daily  meropenem  IVPB 1000 milliGRAM(s) IV Intermittent every 8 hours  meropenem  IVPB      sodium chloride 0.9%. 1000 milliLiter(s) (100 mL/Hr) IV Continuous <Continuous>  valACYclovir 1000 milliGRAM(s) Oral every 8 hours    MEDICATIONS  (PRN):  acetaminophen   Tablet .. 650 milliGRAM(s) Oral every 6 hours PRN Temp greater or equal to 38C (100.4F), Mild Pain (1 - 3)  acetaminophen   Tablet .. 650 milliGRAM(s) Oral every 6 hours PRN Moderate Pain (4 - 6), Severe Pain (7 - 10)  ibuprofen  Tablet. 600 milliGRAM(s) Oral every 8 hours PRN Temp greater or equal to 38.5C (101.3F)  ondansetron Injectable 4 milliGRAM(s) IV Push every 6 hours PRN Nausea

## 2018-10-08 ENCOUNTER — TRANSCRIPTION ENCOUNTER (OUTPATIENT)
Age: 48
End: 2018-10-08

## 2018-10-08 VITALS
TEMPERATURE: 100 F | RESPIRATION RATE: 17 BRPM | DIASTOLIC BLOOD PRESSURE: 60 MMHG | SYSTOLIC BLOOD PRESSURE: 101 MMHG | HEART RATE: 89 BPM

## 2018-10-08 LAB
BASOPHILS # BLD AUTO: 0 K/UL — SIGNIFICANT CHANGE UP (ref 0–0.2)
BASOPHILS NFR BLD AUTO: 0 % — SIGNIFICANT CHANGE UP (ref 0–1)
EOSINOPHIL # BLD AUTO: 0.41 K/UL — SIGNIFICANT CHANGE UP (ref 0–0.7)
EOSINOPHIL NFR BLD AUTO: 3 % — SIGNIFICANT CHANGE UP (ref 0–8)
HCT VFR BLD CALC: 37.7 % — LOW (ref 42–52)
HGB BLD-MCNC: 11.9 G/DL — LOW (ref 14–18)
HYPOCHROMIA BLD QL: SIGNIFICANT CHANGE UP
LYMPHOCYTES # BLD AUTO: 15 % — LOW (ref 20.5–51.1)
LYMPHOCYTES # BLD AUTO: 2.03 K/UL — SIGNIFICANT CHANGE UP (ref 1.2–3.4)
MCHC RBC-ENTMCNC: 26.2 PG — LOW (ref 27–31)
MCHC RBC-ENTMCNC: 31.6 G/DL — LOW (ref 32–37)
MCV RBC AUTO: 82.9 FL — SIGNIFICANT CHANGE UP (ref 80–94)
METAMYELOCYTES # FLD: 11 % — HIGH (ref 0–0)
MONOCYTES # BLD AUTO: 0.95 K/UL — HIGH (ref 0.1–0.6)
MONOCYTES NFR BLD AUTO: 7 % — SIGNIFICANT CHANGE UP (ref 1.7–9.3)
MYELOCYTES NFR BLD: 15 % — HIGH (ref 0–0)
NEUTROPHILS # BLD AUTO: 6.23 K/UL — SIGNIFICANT CHANGE UP (ref 1.4–6.5)
NEUTROPHILS NFR BLD AUTO: 17 % — LOW (ref 42.2–75.2)
NEUTS BAND # BLD: 29 % — HIGH (ref 0–6)
NRBC # BLD: 0 /100 — SIGNIFICANT CHANGE UP (ref 0–0)
NRBC # BLD: SIGNIFICANT CHANGE UP /100 WBCS (ref 0–0)
OVALOCYTES BLD QL SMEAR: SLIGHT — SIGNIFICANT CHANGE UP
PLAT MORPH BLD: NORMAL — SIGNIFICANT CHANGE UP
PLATELET # BLD AUTO: 339 K/UL — SIGNIFICANT CHANGE UP (ref 130–400)
PROMYELOCYTES # FLD: 1 % — HIGH (ref 0–0)
RBC # BLD: 4.55 M/UL — LOW (ref 4.7–6.1)
RBC # FLD: 16.5 % — HIGH (ref 11.5–14.5)
RBC BLD AUTO: ABNORMAL
TOXIC GRANULES BLD QL SMEAR: PRESENT — SIGNIFICANT CHANGE UP
VARIANT LYMPHS # BLD: 2 % — SIGNIFICANT CHANGE UP (ref 0–5)
WBC # BLD: 13.54 K/UL — HIGH (ref 4.8–10.8)
WBC # FLD AUTO: 13.54 K/UL — HIGH (ref 4.8–10.8)

## 2018-10-08 RX ADMIN — FLUCONAZOLE 200 MILLIGRAM(S): 150 TABLET ORAL at 11:25

## 2018-10-08 RX ADMIN — DIPHENHYDRAMINE HYDROCHLORIDE AND LIDOCAINE HYDROCHLORIDE AND ALUMINUM HYDROXIDE AND MAGNESIUM HYDRO 30 MILLILITER(S): KIT at 05:42

## 2018-10-08 RX ADMIN — MEROPENEM 100 MILLIGRAM(S): 1 INJECTION INTRAVENOUS at 05:42

## 2018-10-08 RX ADMIN — VALACYCLOVIR 1000 MILLIGRAM(S): 500 TABLET, FILM COATED ORAL at 05:42

## 2018-10-08 NOTE — DISCHARGE NOTE ADULT - CARE PLAN
Principal Discharge DX:	Other drug-induced neutropenia  Goal:	prevent recurrence  Assessment and plan of treatment:	possibly cocaine induced neutropenia, patient refused bone marrow biopsy   his neutropenia improved with Neupogen  He was treated with antibiotics for neutropenic sepsis  Secondary Diagnosis:	Illicit drug use  Assessment and plan of treatment:	cocaine abuse counselling

## 2018-10-08 NOTE — DISCHARGE NOTE ADULT - HOSPITAL COURSE
Middle aged male with history of cocaine induced neutropenia presented with neutropenic sepsis, possibly due to recurrent use of cocaine. Patient refused bone marrow biopsy for complete evaluation of the neutropenia. His white count improved with Neupogen and he was treated with antibiotics for neutropenic sepsis.

## 2018-10-08 NOTE — DISCHARGE NOTE ADULT - PLAN OF CARE
prevent recurrence possibly cocaine induced neutropenia, patient refused bone marrow biopsy   his neutropenia improved with Neupogen  He was treated with antibiotics for neutropenic sepsis cocaine abuse counselling

## 2018-10-08 NOTE — DISCHARGE NOTE ADULT - PATIENT PORTAL LINK FT
You can access the [facility] Patient Portal, offered by [facility], by registering with the following website: [email or web address]

## 2018-10-09 LAB
CULTURE RESULTS: SIGNIFICANT CHANGE UP
SPECIMEN SOURCE: SIGNIFICANT CHANGE UP

## 2018-10-10 LAB
MANUAL DIF COMMENT BLD-IMP: SIGNIFICANT CHANGE UP

## 2018-10-11 LAB
CULTURE RESULTS: SIGNIFICANT CHANGE UP
SPECIMEN SOURCE: SIGNIFICANT CHANGE UP

## 2018-10-16 DIAGNOSIS — D70.2 OTHER DRUG-INDUCED AGRANULOCYTOSIS: ICD-10-CM

## 2018-10-16 DIAGNOSIS — K12.1 OTHER FORMS OF STOMATITIS: ICD-10-CM

## 2018-10-16 DIAGNOSIS — T40.5X1A POISONING BY COCAINE, ACCIDENTAL (UNINTENTIONAL), INITIAL ENCOUNTER: ICD-10-CM

## 2018-10-16 DIAGNOSIS — B00.1 HERPESVIRAL VESICULAR DERMATITIS: ICD-10-CM

## 2018-10-16 DIAGNOSIS — R50.81 FEVER PRESENTING WITH CONDITIONS CLASSIFIED ELSEWHERE: ICD-10-CM

## 2018-10-16 DIAGNOSIS — K12.32 ORAL MUCOSITIS (ULCERATIVE) DUE TO OTHER DRUGS: ICD-10-CM

## 2018-10-16 DIAGNOSIS — A41.9 SEPSIS, UNSPECIFIED ORGANISM: ICD-10-CM

## 2018-10-25 ENCOUNTER — EMERGENCY (EMERGENCY)
Facility: HOSPITAL | Age: 48
LOS: 0 days | Discharge: HOME | End: 2018-10-25
Attending: EMERGENCY MEDICINE | Admitting: EMERGENCY MEDICINE

## 2018-10-25 VITALS
RESPIRATION RATE: 18 BRPM | TEMPERATURE: 103 F | HEART RATE: 108 BPM | SYSTOLIC BLOOD PRESSURE: 120 MMHG | DIASTOLIC BLOOD PRESSURE: 72 MMHG | WEIGHT: 214.95 LBS | OXYGEN SATURATION: 99 % | HEIGHT: 75 IN

## 2018-10-25 VITALS
HEART RATE: 86 BPM | SYSTOLIC BLOOD PRESSURE: 118 MMHG | TEMPERATURE: 100 F | OXYGEN SATURATION: 98 % | RESPIRATION RATE: 18 BRPM | DIASTOLIC BLOOD PRESSURE: 79 MMHG

## 2018-10-25 DIAGNOSIS — D70.9 NEUTROPENIA, UNSPECIFIED: ICD-10-CM

## 2018-10-25 DIAGNOSIS — A41.9 SEPSIS, UNSPECIFIED ORGANISM: ICD-10-CM

## 2018-10-25 DIAGNOSIS — F19.90 OTHER PSYCHOACTIVE SUBSTANCE USE, UNSPECIFIED, UNCOMPLICATED: ICD-10-CM

## 2018-10-25 DIAGNOSIS — R68.83 CHILLS (WITHOUT FEVER): ICD-10-CM

## 2018-10-25 DIAGNOSIS — F17.210 NICOTINE DEPENDENCE, CIGARETTES, UNCOMPLICATED: ICD-10-CM

## 2018-10-25 LAB
ALBUMIN SERPL ELPH-MCNC: 3.7 G/DL — SIGNIFICANT CHANGE UP (ref 3.5–5.2)
ALP SERPL-CCNC: 51 U/L — SIGNIFICANT CHANGE UP (ref 30–115)
ALT FLD-CCNC: 25 U/L — SIGNIFICANT CHANGE UP (ref 0–41)
ANION GAP SERPL CALC-SCNC: 11 MMOL/L — SIGNIFICANT CHANGE UP (ref 7–14)
AST SERPL-CCNC: 61 U/L — HIGH (ref 0–41)
BASE EXCESS BLDV CALC-SCNC: 2.5 MMOL/L — HIGH (ref -2–2)
BASOPHILS # BLD AUTO: 0.02 K/UL — SIGNIFICANT CHANGE UP (ref 0–0.2)
BASOPHILS NFR BLD AUTO: 1.7 % — HIGH (ref 0–1)
BILIRUB SERPL-MCNC: 0.6 MG/DL — SIGNIFICANT CHANGE UP (ref 0.2–1.2)
BUN SERPL-MCNC: 16 MG/DL — SIGNIFICANT CHANGE UP (ref 10–20)
CA-I SERPL-SCNC: SIGNIFICANT CHANGE UP MMOL/L (ref 1.12–1.3)
CALCIUM SERPL-MCNC: 8.8 MG/DL — SIGNIFICANT CHANGE UP (ref 8.5–10.1)
CHLORIDE SERPL-SCNC: 95 MMOL/L — LOW (ref 98–110)
CO2 SERPL-SCNC: 23 MMOL/L — SIGNIFICANT CHANGE UP (ref 17–32)
CREAT SERPL-MCNC: 1.3 MG/DL — SIGNIFICANT CHANGE UP (ref 0.7–1.5)
EOSINOPHIL # BLD AUTO: 0.03 K/UL — SIGNIFICANT CHANGE UP (ref 0–0.7)
EOSINOPHIL NFR BLD AUTO: 2.5 % — SIGNIFICANT CHANGE UP (ref 0–8)
GAS PNL BLDV: SIGNIFICANT CHANGE UP
GAS PNL BLDV: SIGNIFICANT CHANGE UP MMOL/L (ref 136–145)
GIANT PLATELETS BLD QL SMEAR: PRESENT — SIGNIFICANT CHANGE UP
GLUCOSE SERPL-MCNC: 104 MG/DL — HIGH (ref 70–99)
HCO3 BLDV-SCNC: 26 MMOL/L — SIGNIFICANT CHANGE UP (ref 22–29)
HCT VFR BLD CALC: 34.2 % — LOW (ref 42–52)
HCT VFR BLDA CALC: 35.2 % — SIGNIFICANT CHANGE UP (ref 34–44)
HGB BLD CALC-MCNC: 11.5 G/DL — LOW (ref 14–18)
HGB BLD-MCNC: 10.8 G/DL — LOW (ref 14–18)
HOROWITZ INDEX BLDV+IHG-RTO: 21 — SIGNIFICANT CHANGE UP
IMM GRANULOCYTES NFR BLD AUTO: 0 % — LOW (ref 0.1–0.3)
LACTATE BLDV-MCNC: SIGNIFICANT CHANGE UP MMOL/L (ref 0.5–1.6)
LACTATE SERPL-SCNC: 0.7 MMOL/L — SIGNIFICANT CHANGE UP (ref 0.5–2.2)
LG PLATELETS BLD QL AUTO: SLIGHT — SIGNIFICANT CHANGE UP
LYMPHOCYTES # BLD AUTO: 0.73 K/UL — LOW (ref 1.2–3.4)
LYMPHOCYTES # BLD AUTO: 61.9 % — HIGH (ref 20.5–51.1)
MCHC RBC-ENTMCNC: 26.2 PG — LOW (ref 27–31)
MCHC RBC-ENTMCNC: 31.6 G/DL — LOW (ref 32–37)
MCV RBC AUTO: 83 FL — SIGNIFICANT CHANGE UP (ref 80–94)
MONOCYTES # BLD AUTO: 0.38 K/UL — SIGNIFICANT CHANGE UP (ref 0.1–0.6)
MONOCYTES NFR BLD AUTO: 32.2 % — HIGH (ref 1.7–9.3)
NEUTROPHILS # BLD AUTO: 0.02 K/UL — LOW (ref 1.4–6.5)
NEUTROPHILS NFR BLD AUTO: 1.7 % — LOW (ref 42.2–75.2)
NRBC # BLD: 0 /100 WBCS — SIGNIFICANT CHANGE UP (ref 0–0)
NRBC # BLD: 0 /100 — SIGNIFICANT CHANGE UP (ref 0–0)
PCO2 BLDV: 37 MMHG — LOW (ref 41–51)
PH BLDV: 7.46 — HIGH (ref 7.26–7.43)
PLAT MORPH BLD: ABNORMAL
PLATELET # BLD AUTO: 336 K/UL — SIGNIFICANT CHANGE UP (ref 130–400)
PO2 BLDV: 36 MMHG — SIGNIFICANT CHANGE UP (ref 20–40)
POTASSIUM BLDV-SCNC: 4 MMOL/L — SIGNIFICANT CHANGE UP (ref 3.3–5.6)
POTASSIUM SERPL-MCNC: 7.9 MMOL/L — CRITICAL HIGH (ref 3.5–5)
POTASSIUM SERPL-SCNC: 7.9 MMOL/L — CRITICAL HIGH (ref 3.5–5)
PROT SERPL-MCNC: 8.1 G/DL — HIGH (ref 6–8)
RBC # BLD: 4.12 M/UL — LOW (ref 4.7–6.1)
RBC # FLD: 17.1 % — HIGH (ref 11.5–14.5)
RBC BLD AUTO: NORMAL — SIGNIFICANT CHANGE UP
SAO2 % BLDV: 72 % — SIGNIFICANT CHANGE UP
SODIUM SERPL-SCNC: 129 MMOL/L — LOW (ref 135–146)
WBC # BLD: 1.18 K/UL — LOW (ref 4.8–10.8)
WBC # FLD AUTO: 1.18 K/UL — LOW (ref 4.8–10.8)

## 2018-10-25 RX ORDER — FLUCONAZOLE 150 MG/1
200 TABLET ORAL ONCE
Qty: 0 | Refills: 0 | Status: COMPLETED | OUTPATIENT
Start: 2018-10-25 | End: 2018-10-25

## 2018-10-25 RX ORDER — CEFEPIME 1 G/1
1000 INJECTION, POWDER, FOR SOLUTION INTRAMUSCULAR; INTRAVENOUS ONCE
Qty: 0 | Refills: 0 | Status: COMPLETED | OUTPATIENT
Start: 2018-10-25 | End: 2018-10-25

## 2018-10-25 RX ORDER — VANCOMYCIN HCL 1 G
1000 VIAL (EA) INTRAVENOUS ONCE
Qty: 0 | Refills: 0 | Status: COMPLETED | OUTPATIENT
Start: 2018-10-25 | End: 2018-10-25

## 2018-10-25 RX ADMIN — Medication 250 MILLIGRAM(S): at 17:56

## 2018-10-25 RX ADMIN — FLUCONAZOLE 200 MILLIGRAM(S): 150 TABLET ORAL at 17:58

## 2018-10-25 RX ADMIN — CEFEPIME 100 MILLIGRAM(S): 1 INJECTION, POWDER, FOR SOLUTION INTRAMUSCULAR; INTRAVENOUS at 18:15

## 2018-10-25 NOTE — ED PROVIDER NOTE - CARE PLAN
Principal Discharge DX:	Sepsis, due to unspecified organism  Secondary Diagnosis:	Illicit drug use  Secondary Diagnosis:	Neutropenia, unspecified type

## 2018-10-25 NOTE — ED ADULT NURSE NOTE - NSIMPLEMENTINTERV_GEN_ALL_ED
Implemented All Universal Safety Interventions:  [place] to call system. Call bell, personal items and telephone within reach. Instruct patient to call for assistance. Room bathroom lighting operational. Non-slip footwear when patient is off stretcher. Physically safe environment: no spills, clutter or unnecessary equipment. Stretcher in lowest position, wheels locked, appropriate side rails in place.

## 2018-10-25 NOTE — ED PROVIDER NOTE - OBJECTIVE STATEMENT
49 y/o male with hx of cocaine induced neutropenia presents c/o chills, multiple abscesses, odynophagia, and weakness. patient recently used cocaine 6 days ago. patient was discharged recently for similar symptoms , seen by hematology , offered BM bx and refused. patient denies any cough congestion, abdominal or back pain

## 2018-10-25 NOTE — ED PROVIDER NOTE - ATTENDING CONTRIBUTION TO CARE
48 y.o. male, PMH of cocaine induced neutropenia, c/o fever/chills, multiple abscesses, odynophagia, and weakness. Last cocaine use 6 days ago. On exam, pt in NAD, AAOx3, MMM, CN II-XII intact, lungs CTA B/L, CV S1S2 regular, abdomen soft/NT/ND/(+)BS, ext (-) edema. skin (+) streaking up right arm, (+) abscess to left upper arm and near left nipple, motor 5/5x4, sensation intact. WIll do labs, CXR, EKG, Abx and admit.

## 2018-10-25 NOTE — ED PROVIDER NOTE - PHYSICAL EXAMINATION
skin: multiple abscesses noted to left tricep and left chest without fluctuance +central pustule appreciated and tenderness  right hand +ulceration appreciated with lymphangitic spread to proximal forearm

## 2018-10-26 LAB
MANUAL DIF COMMENT BLD-IMP: SIGNIFICANT CHANGE UP

## 2018-10-27 ENCOUNTER — INPATIENT (INPATIENT)
Facility: HOSPITAL | Age: 48
LOS: 3 days | Discharge: HOME | End: 2018-10-31
Attending: HOSPITALIST | Admitting: HOSPITALIST

## 2018-10-27 VITALS
RESPIRATION RATE: 18 BRPM | DIASTOLIC BLOOD PRESSURE: 80 MMHG | TEMPERATURE: 101 F | SYSTOLIC BLOOD PRESSURE: 131 MMHG | OXYGEN SATURATION: 96 % | HEART RATE: 125 BPM

## 2018-10-27 LAB
ANION GAP SERPL CALC-SCNC: 16 MMOL/L — HIGH (ref 7–14)
BASE EXCESS BLDV CALC-SCNC: 3 MMOL/L — HIGH (ref -2–2)
BUN SERPL-MCNC: 11 MG/DL — SIGNIFICANT CHANGE UP (ref 10–20)
CA-I SERPL-SCNC: 1.14 MMOL/L — SIGNIFICANT CHANGE UP (ref 1.12–1.3)
CALCIUM SERPL-MCNC: 8.6 MG/DL — SIGNIFICANT CHANGE UP (ref 8.5–10.1)
CHLORIDE SERPL-SCNC: 99 MMOL/L — SIGNIFICANT CHANGE UP (ref 98–110)
CO2 SERPL-SCNC: 23 MMOL/L — SIGNIFICANT CHANGE UP (ref 17–32)
CREAT SERPL-MCNC: 0.9 MG/DL — SIGNIFICANT CHANGE UP (ref 0.7–1.5)
ERYTHROCYTE [SEDIMENTATION RATE] IN BLOOD: 66 MM/HR — HIGH (ref 0–10)
GAS PNL BLDV: 136 MMOL/L — SIGNIFICANT CHANGE UP (ref 136–145)
GAS PNL BLDV: SIGNIFICANT CHANGE UP
GLUCOSE SERPL-MCNC: 115 MG/DL — HIGH (ref 70–99)
HCO3 BLDV-SCNC: 27 MMOL/L — SIGNIFICANT CHANGE UP (ref 22–29)
HCT VFR BLD CALC: 32.4 % — LOW (ref 42–52)
HCT VFR BLDA CALC: 40.5 % — SIGNIFICANT CHANGE UP (ref 34–44)
HGB BLD CALC-MCNC: 13.2 G/DL — LOW (ref 14–18)
HGB BLD-MCNC: 10.4 G/DL — LOW (ref 14–18)
LACTATE BLDV-MCNC: 1.2 MMOL/L — SIGNIFICANT CHANGE UP (ref 0.5–1.6)
LACTATE SERPL-SCNC: 0.8 MMOL/L — SIGNIFICANT CHANGE UP (ref 0.5–2.2)
MCHC RBC-ENTMCNC: 25.8 PG — LOW (ref 27–31)
MCHC RBC-ENTMCNC: 32.1 G/DL — SIGNIFICANT CHANGE UP (ref 32–37)
MCV RBC AUTO: 80.4 FL — SIGNIFICANT CHANGE UP (ref 80–94)
NRBC # BLD: 0 /100 WBCS — SIGNIFICANT CHANGE UP (ref 0–0)
PCO2 BLDV: 37 MMHG — LOW (ref 41–51)
PH BLDV: 7.46 — HIGH (ref 7.26–7.43)
PLATELET # BLD AUTO: 268 K/UL — SIGNIFICANT CHANGE UP (ref 130–400)
PO2 BLDV: 47 MMHG — HIGH (ref 20–40)
POTASSIUM BLDV-SCNC: 3.6 MMOL/L — SIGNIFICANT CHANGE UP (ref 3.3–5.6)
POTASSIUM SERPL-MCNC: 3.9 MMOL/L — SIGNIFICANT CHANGE UP (ref 3.5–5)
POTASSIUM SERPL-SCNC: 3.9 MMOL/L — SIGNIFICANT CHANGE UP (ref 3.5–5)
RBC # BLD: 4.03 M/UL — LOW (ref 4.7–6.1)
RBC # FLD: 17 % — HIGH (ref 11.5–14.5)
SAO2 % BLDV: 86 % — SIGNIFICANT CHANGE UP
SODIUM SERPL-SCNC: 138 MMOL/L — SIGNIFICANT CHANGE UP (ref 135–146)
WBC # BLD: 3.01 K/UL — LOW (ref 4.8–10.8)
WBC # FLD AUTO: 3.01 K/UL — LOW (ref 4.8–10.8)

## 2018-10-27 RX ORDER — ENOXAPARIN SODIUM 100 MG/ML
40 INJECTION SUBCUTANEOUS DAILY
Qty: 0 | Refills: 0 | Status: DISCONTINUED | OUTPATIENT
Start: 2018-10-27 | End: 2018-10-31

## 2018-10-27 RX ORDER — VANCOMYCIN HCL 1 G
1000 VIAL (EA) INTRAVENOUS ONCE
Qty: 0 | Refills: 0 | Status: COMPLETED | OUTPATIENT
Start: 2018-10-27 | End: 2018-10-27

## 2018-10-27 RX ORDER — ACETAMINOPHEN 500 MG
975 TABLET ORAL ONCE
Qty: 0 | Refills: 0 | Status: COMPLETED | OUTPATIENT
Start: 2018-10-27 | End: 2018-10-27

## 2018-10-27 RX ORDER — PIPERACILLIN AND TAZOBACTAM 4; .5 G/20ML; G/20ML
4.5 INJECTION, POWDER, LYOPHILIZED, FOR SOLUTION INTRAVENOUS ONCE
Qty: 0 | Refills: 0 | Status: COMPLETED | OUTPATIENT
Start: 2018-10-27 | End: 2018-10-27

## 2018-10-27 RX ADMIN — Medication 975 MILLIGRAM(S): at 22:16

## 2018-10-27 RX ADMIN — Medication 975 MILLIGRAM(S): at 20:22

## 2018-10-27 RX ADMIN — Medication 1000 MILLIGRAM(S): at 22:16

## 2018-10-27 RX ADMIN — Medication 250 MILLIGRAM(S): at 20:22

## 2018-10-27 RX ADMIN — PIPERACILLIN AND TAZOBACTAM 200 GRAM(S): 4; .5 INJECTION, POWDER, LYOPHILIZED, FOR SOLUTION INTRAVENOUS at 22:16

## 2018-10-27 NOTE — ED PROVIDER NOTE - ATTENDING CONTRIBUTION TO CARE
47 yo male h/o cocaine induced neutropenia ( last cocaine use today, smoked) c/o generalized weakness, fever and worsening redness and swelling of his rt middle finger.  Symptoms started several days ago, he was seen in the ED on 10/25, given abx, but eloped from the ED.  He wants to be treated and says will stay in the hospital today.  Middle aged male, resting on a stretcher, NAD, PERRL, mmm, supple neck, nml work of breathing, lungs CTA b/l, equal air entry, RRR without appreciable murmur, abdomen soft, NT/ND, no leg edema or calf ttp, multiple small non-fluctuant abscesses mostly on upper extremities, + ulceration on the palmar aspect of the rt middle finger distally with diffuse finger edema and erythema with decreased ROM due to swelling, erythema and warmth extends from the finger to the rt axillary area, no palpable ARLETTE; A&Ox3, no gross neuro deficits.  Will check labs, abx, admit, hand surgery consult.

## 2018-10-27 NOTE — H&P ADULT - NSHPSOCIALHISTORY_GEN_ALL_CORE
He occasionally uses tobacco cigars. He does cocaine regularly; last used today. Denies alcohol.   He lives at home. He occasionally uses tobacco cigars. He does cocaine regularly; last used today. Drinks alcohol and smokes cigarettes occasionally  He lives at home.

## 2018-10-27 NOTE — ED ADULT NURSE NOTE - CHIEF COMPLAINT QUOTE
"I have neutropenia and I feel very weak. I have fevers on and off." (+) right middle finger pain/redness & swelling.  Note: Tylenol 650 mg PO given in Triage
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
## 2018-10-27 NOTE — ED ADULT TRIAGE NOTE - CHIEF COMPLAINT QUOTE
"I have neutropenia and I feel very weak. I have fevers on and off." "I have neutropenia and I feel very weak. I have fevers on and off." (+) right middle finger pain/redness & swelling "I have neutropenia and I feel very weak. I have fevers on and off." (+) right middle finger pain/redness & swelling.  Note: Tylenol 650 mg PO given in Triage

## 2018-10-27 NOTE — H&P ADULT - ASSESSMENT
8y Male with R 3rd digit ulceration with overlying cellulitis  - Procedure: Under sterile conditions, patient was given a Digital block. Ulceration was probed. No expressible purulence. Betadine dressing applied. Patient tolerated procedure well.   -Admit for broad spectrum antibiotics  - NPO at midnight   -Pain control  -Strict Extremity icing/elevation  -ID and hematology consult  -Will reevaluate in the AM for need for surgical intervention  Case discussed with attending 47 yo M with PMHx of cocaine-induced neutropenia? [on prior admission, was thought to be secondary to underlying malignancy vs aplastic anemia vs Sweet's syndrome] on Neupogen, substance abuse, presents for R 3rd phalanx swelling and erythema with extension to shoulder.    #) Acute R 3rd digit ulceration with overlying cellulitis and lymphangiitis  - ESR 66, Lactate 1.2  - Hand Surgery: Start on broad spectrum antibiotics. NPO at midnight. Strict extremity icing/elevation. Will reevaluate in AM for need for surgical intervention  - Follow-up official XR Hand 3-views read  - Pain Control: Tramadol 50 mg Q8H, Percocet PRN Mod  - Follow-up ID   - Start on Cefepime 2 g Q8H and Vancomycin 1 g BID    #) Cocaine-induced neutropenia? vs 2/2 malignancy vs Sweet's Syndrome  - Admission WBC 3.01  - He says that he gets weekly Neupogen shots at [facility]; however, there are no recent records on [place]  - Follow-up Hematology     #) Normocytic anemia likely 2/2 chronic disease, stable, asymptomatic  - Baseline Hb 10.5    #) Substance Abuse  - Follow-up [facility]    #) GI ppx: Not indicated  #) DVT ppx: Lovenox QD    #) Activity: Ambulate as tolerated  #) Diet: NPO except for medications    #) Dispo: From Home  #) Full Code 47 yo M with PMHx of cocaine-induced neutropenia? [on prior admission, was thought to be secondary to underlying malignancy vs aplastic anemia vs Sweet's syndrome] on Neupogen, substance abuse, presents for R 3rd phalanx swelling and erythema with extension to shoulder.    #) Acute R 3rd digit ulceration with overlying cellulitis and lymphangiitis  - ESR 66, Lactate 1.2  - Hand Surgery: Start on broad spectrum antibiotics. NPO at midnight. Strict extremity icing/elevation. Will reevaluate in AM for need for surgical intervention  - Follow-up official XR Hand 3-views read  - Pain Control: Tramadol 50 mg Q8H, Percocet PRN Mod  - Follow-up Infectious disease  - Start on Cefepime 2 g Q8H and Vancomycin 1 g BID    #) Cocaine-induced neutropenia? vs 2/2 malignancy vs Sweet's Syndrome  - Admission WBC 3.01  - He says that he gets weekly Neupogen shots at [facility]; however, there are no recent records on [place]  - Follow-up Hematology     #) Normocytic anemia likely 2/2 chronic disease, stable, asymptomatic  - Baseline Hb 10.5    #) Substance Abuse  - Follow-up [facility]    #) GI ppx: Not indicated  #) DVT ppx: Lovenox QD    #) Activity: Ambulate as tolerated  #) Diet: NPO except for medications    #) Dispo: From Home  #) Full Code
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
## 2018-10-27 NOTE — ED PROVIDER NOTE - MEDICAL DECISION MAKING DETAILS
49 yo male with neutropenia, fever and finger abscess.  Abx, admit. 47 yo male with neutropenia, fever and finger abscess.  Abx, admit..

## 2018-10-27 NOTE — ED PROVIDER NOTE - NS ED ROS FT
ENMT:  No hearing changes, pain, discharge or infections. No neck pain or stiffness.  Cardiac:  No chest pain, SOB or edema  Respiratory:  No cough or respiratory distress. No hemoptysis. No history of asthma or RAD.  GI:  No nausea, vomiting, diarrhea or abdominal pain.  MS:  No myalgia, muscle weakness, joint pain or back pain.  Neuro:  No headache or weakness.  No LOC.  Skin:  + cellulitis  Endocrine: No history of thyroid disease or diabetes.  Except as documented in the HPI,  all other systems are negative.

## 2018-10-27 NOTE — H&P ADULT - ATTENDING COMMENTS
Patient is seen and examined independently at bedside. I agree with the resident's note, history and plan as above. Corrections made where appropriate    All labs, radiologic studies, vitals, orders and medications list reviewed.    In summary, this is a 49 yo male with Neutropenia on neupogen, active cocaine use x30 years, presented to the ED with worsening R third digit pain and swelling, as well as erythema extending to the right arm, associated with subjective fevers. Patient states that he works as a , wears gloves but they rip often, and states that's likely the source of his infection    VITAL SIGNS (Last 24 hrs):  T(C): 37.1 (10-28-18 @ 15:33), Max: 38.2 (10-27-18 @ 18:23)  HR: 93 (10-28-18 @ 15:33) (75 - 125)  BP: 114/60 (10-28-18 @ 15:33) (104/67 - 131/80)  RR: 18 (10-28-18 @ 15:33) (18 - 18)  SpO2: 95% (10-28-18 @ 15:33) (95% - 98%)    PHYSICAL EXAM:  GENERAL: NAD, speaks in full sentences, no signs of respiratory distress  HEAD:  Atraumatic, Normocephalic  EYES: EOMI, PERRLA, conjunctiva and sclera clear  NECK: Supple, No JVD  CHEST/LUNG: Clear to auscultation bilaterally; No wheeze; No crackles; No accessory muscles used  HEART: Regular rate and rhythm; No murmurs;   ABDOMEN: Soft, Nontender, Nondistended; Bowel sounds present; No guarding  Musculoskeletal:  RUE erythema greatly improved, and receding from the outlined border. Right third digit swollen and extremely tender to movements and touch 2+ Peripheral Pulses, No cyanosis or edema  PSYCH: AAOx3  NEUROLOGY: non-focal  SKIN: No rashes or lesions    1. Right finger/hand cellulitis  2. Neutropenia  3. Cocaine abuse    --Orthopedics, ID and heme/onc evals appreciated  --s/p bedside I&D, f/u boold and wound cultures  --C/w Cefepime and vancomycin  -c/w pain control  --Elevate arm, NPO after midnight for possible surgical intervention  --Neupogen 480 mcg daily per heme onc  --daily CBC, BMP, Mg  --counseled patient on Cocaine cessation, he is not interested to quit at this time.   --encourage ambulation Patient is seen and examined independently at bedside. I agree with the resident's note, history and plan as above. Corrections made where appropriate    All labs, radiologic studies, vitals, orders and medications list reviewed.    In summary, this is a 47 yo male with Neutropenia on neupogen, active cocaine use x30 years, presented to the ED with worsening R third digit pain and swelling, as well as erythema extending to the right arm, associated with subjective fevers. Patient states that he works as a , wears gloves but they rip often, and states that's likely the source of his infection    VITAL SIGNS (Last 24 hrs):  T(C): 37.1 (10-28-18 @ 15:33), Max: 38.2 (10-27-18 @ 18:23)  HR: 93 (10-28-18 @ 15:33) (75 - 125)  BP: 114/60 (10-28-18 @ 15:33) (104/67 - 131/80)  RR: 18 (10-28-18 @ 15:33) (18 - 18)  SpO2: 95% (10-28-18 @ 15:33) (95% - 98%)    PHYSICAL EXAM:  GENERAL: NAD, speaks in full sentences, no signs of respiratory distress  HEAD:  Atraumatic, Normocephalic  EYES: EOMI, PERRLA, conjunctiva and sclera clear  NECK: Supple, No JVD  CHEST/LUNG: Clear to auscultation bilaterally; No wheeze; No crackles; No accessory muscles used  HEART: Regular rate and rhythm; No murmurs;   ABDOMEN: Soft, Nontender, Nondistended; Bowel sounds present; No guarding  Musculoskeletal:  RUE erythema greatly improved, and receding from the outlined border. Right third digit swollen and extremely tender to movements and touch 2+ Peripheral Pulses, No cyanosis or edema  PSYCH: AAOx3  NEUROLOGY: non-focal  SKIN: No rashes or lesions    1. Right finger/hand cellulitis  2. Neutropenia  3. Cocaine abuse    --Orthopedics, ID and heme/onc evals appreciated  --s/p bedside I&D, f/u boold and wound cultures  --C/w Cefepime and vancomycin  -c/w pain control  --Elevate arm, NPO after midnight for possible surgical intervention  --Neupogen 480 mcg daily per heme onc  --daily CBC, BMP, Mg  --counseled patient on Cocaine cessation, he is not interested to quit at this time.   --encourage ambulation    Attending (Dr. Willis) Addendum):  Patient seen and examined independently. I personally had a face-to-face encounter with the patient, examined the patient myself and reviewed the plan of care with the housestaff. Agree with Dr Chaudhry's note above .   c/w Vanc, cefepime, f/u cultures, Ortho.
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
## 2018-10-27 NOTE — ED PROVIDER NOTE - PHYSICAL EXAMINATION
VITAL SIGNS: I have reviewed nursing notes and confirm.  CONSTITUTIONAL: Well-developed; well-nourished; in no acute distress.   SKIN: diffuse redness and edema to right middle finger with streaking up to shoulder  HEAD: Normocephalic; atraumatic.  EYES:  conjunctiva and sclera clear.  ENT: No nasal discharge; airway clear.  CARD: S1, S2 normal; no murmurs, gallops, or rubs. Tachycardia  RESP: No wheezes, rales or rhonchi.  EXT: Normal ROM.  No clubbing, cyanosis or edema.   NEURO: Alert, oriented, grossly unremarkable  .

## 2018-10-27 NOTE — H&P ADULT - NSHPLABSRESULTS_GEN_ALL_CORE
LABS:                        10.4   3.01  )-----------( 268      ( 27 Oct 2018 20:27 )             32.4     10-27    138  |  99  |  11  ----------------------------<  115<H>  3.9   |  23  |  0.9    Ca    8.6      27 Oct 2018 20:27            Lactate, Blood: 0.8 mmol/L (10-27-18 @ 20:27)  Sedimentation Rate, Erythrocyte: 66 mm/Hr <H> (10-27-18 @ 20:00)      Culture - Blood (collected 25 Oct 2018 17:22)  Source: .Blood Blood  Preliminary Report (27 Oct 2018 01:01):    No growth to date.

## 2018-10-27 NOTE — ED PROVIDER NOTE - OBJECTIVE STATEMENT
Pt is a 49y/o male with a pmhx of cocaine induced neutropenia presents today for eval of riht middle finger swelling and redness that has extended up to shoulder. Pt was seen for similar complaints 2 days ago in ED and eloped. Pt sts symptoms worsened today which prompted his visit. Pt denies abd pain, CP, SOB, N/V/D.

## 2018-10-27 NOTE — H&P ADULT - NSHPPHYSICALEXAM_GEN_ALL_CORE
VITAL SIGNS: I have reviewed nursing notes and confirm.  	CONSTITUTIONAL: Well-developed; well-nourished; in no acute distress.   	SKIN: diffuse redness and edema to right middle finger with streaking up to shoulder  	HEAD: Normocephalic; atraumatic.  	EYES:  conjunctiva and sclera clear.  	ENT: No nasal discharge; airway clear.  	CARD: S1, S2 normal; no murmurs, gallops, or rubs. Tachycardia  	RESP: No wheezes, rales or rhonchi.  	EXT: Normal ROM.  No clubbing, cyanosis or edema.   	NEURO: Alert, oriented, grossly unremarkable    Ulceration over volar aspect of R 3rd Middle phalanx.   Swelling throughout digit  Pain with passive stretch  No TTP flexor sheath proximal to ulceration  Surrounding erythema tracking up forearm and arm  Full baseline painless ROM at shoulder, elbow, wrist  Pain with ROM of digit  SILT distally   AIN/PIN/U motor intact  Compartments soft and compressible. PHYSICAL EXAM:  GEN: No acute distress  HEENT: NCAT  LUNGS: Clear to auscultation bilaterally   HEART: S1/S2 present. RRR.   ABD: Soft, non-tender, non-distended. Bowel sounds present  EXT: No pitting edema. Diffuse redness and erythema to right middle finger with streaking up to shoulder. Reduced ROM of 3rd phalanx  NEURO: AAOX3 PHYSICAL EXAM:  GEN: No acute distress, complaints of RUE pain  HEENT: NCAT  LUNGS: Clear to auscultation bilaterally   HEART: S1/S2 present. RRR.   ABD: Soft, non-tender, non-distended. Bowel sounds present  EXT: No pitting edema. Diffuse redness and erythema to right middle finger with streaking up to shoulder. Reduced ROM of 3rd phalanx  NEURO: AAOX3

## 2018-10-28 LAB
ALBUMIN SERPL ELPH-MCNC: 3.2 G/DL — LOW (ref 3.5–5.2)
ALP SERPL-CCNC: 58 U/L — SIGNIFICANT CHANGE UP (ref 30–115)
ALT FLD-CCNC: 17 U/L — SIGNIFICANT CHANGE UP (ref 0–41)
ANION GAP SERPL CALC-SCNC: 12 MMOL/L — SIGNIFICANT CHANGE UP (ref 7–14)
APTT BLD: 46.2 SEC — HIGH (ref 27–39.2)
AST SERPL-CCNC: 19 U/L — SIGNIFICANT CHANGE UP (ref 0–41)
BASOPHILS # BLD AUTO: 0.04 K/UL — SIGNIFICANT CHANGE UP (ref 0–0.2)
BASOPHILS NFR BLD AUTO: 1.8 % — HIGH (ref 0–1)
BILIRUB DIRECT SERPL-MCNC: <0.2 MG/DL — SIGNIFICANT CHANGE UP (ref 0–0.2)
BILIRUB INDIRECT FLD-MCNC: >0.2 MG/DL — SIGNIFICANT CHANGE UP (ref 0.2–1.2)
BILIRUB SERPL-MCNC: 0.4 MG/DL — SIGNIFICANT CHANGE UP (ref 0.2–1.2)
BUN SERPL-MCNC: 9 MG/DL — LOW (ref 10–20)
CALCIUM SERPL-MCNC: 8.7 MG/DL — SIGNIFICANT CHANGE UP (ref 8.5–10.1)
CHLORIDE SERPL-SCNC: 96 MMOL/L — LOW (ref 98–110)
CHOLEST SERPL-MCNC: 103 MG/DL — SIGNIFICANT CHANGE UP (ref 100–200)
CO2 SERPL-SCNC: 25 MMOL/L — SIGNIFICANT CHANGE UP (ref 17–32)
CREAT SERPL-MCNC: 0.9 MG/DL — SIGNIFICANT CHANGE UP (ref 0.7–1.5)
EOSINOPHIL # BLD AUTO: 0.11 K/UL — SIGNIFICANT CHANGE UP (ref 0–0.7)
EOSINOPHIL NFR BLD AUTO: 5 % — SIGNIFICANT CHANGE UP (ref 0–8)
GLUCOSE SERPL-MCNC: 104 MG/DL — HIGH (ref 70–99)
HCT VFR BLD CALC: 34.3 % — LOW (ref 42–52)
HDLC SERPL-MCNC: 22 MG/DL — LOW
HGB BLD-MCNC: 10.9 G/DL — LOW (ref 14–18)
IMM GRANULOCYTES NFR BLD AUTO: 2.7 % — HIGH (ref 0.1–0.3)
INR BLD: 1.41 RATIO — HIGH (ref 0.65–1.3)
LIPID PNL WITH DIRECT LDL SERPL: 59 MG/DL — SIGNIFICANT CHANGE UP (ref 4–129)
LYMPHOCYTES # BLD AUTO: 1 K/UL — LOW (ref 1.2–3.4)
LYMPHOCYTES # BLD AUTO: 45.2 % — SIGNIFICANT CHANGE UP (ref 20.5–51.1)
MAGNESIUM SERPL-MCNC: 2 MG/DL — SIGNIFICANT CHANGE UP (ref 1.8–2.4)
MCHC RBC-ENTMCNC: 25.7 PG — LOW (ref 27–31)
MCHC RBC-ENTMCNC: 31.8 G/DL — LOW (ref 32–37)
MCV RBC AUTO: 80.9 FL — SIGNIFICANT CHANGE UP (ref 80–94)
MONOCYTES # BLD AUTO: 0.79 K/UL — HIGH (ref 0.1–0.6)
MONOCYTES NFR BLD AUTO: 35.7 % — HIGH (ref 1.7–9.3)
NEUTROPHILS # BLD AUTO: 0.21 K/UL — LOW (ref 1.4–6.5)
NEUTROPHILS NFR BLD AUTO: 9.6 % — LOW (ref 42.2–75.2)
NRBC # BLD: 0 /100 WBCS — SIGNIFICANT CHANGE UP (ref 0–0)
PHOSPHATE SERPL-MCNC: 2.9 MG/DL — SIGNIFICANT CHANGE UP (ref 2.1–4.9)
PLATELET # BLD AUTO: 283 K/UL — SIGNIFICANT CHANGE UP (ref 130–400)
POTASSIUM SERPL-MCNC: 3.9 MMOL/L — SIGNIFICANT CHANGE UP (ref 3.5–5)
POTASSIUM SERPL-SCNC: 3.9 MMOL/L — SIGNIFICANT CHANGE UP (ref 3.5–5)
PROT SERPL-MCNC: 7.2 G/DL — SIGNIFICANT CHANGE UP (ref 6–8)
PROTHROM AB SERPL-ACNC: 16.2 SEC — HIGH (ref 9.95–12.87)
RBC # BLD: 4.24 M/UL — LOW (ref 4.7–6.1)
RBC # FLD: 17.3 % — HIGH (ref 11.5–14.5)
SODIUM SERPL-SCNC: 133 MMOL/L — LOW (ref 135–146)
TOTAL CHOLESTEROL/HDL RATIO MEASUREMENT: 4.7 RATIO — SIGNIFICANT CHANGE UP (ref 4–5.5)
TRIGL SERPL-MCNC: 105 MG/DL — SIGNIFICANT CHANGE UP (ref 10–149)
WBC # BLD: 2.21 K/UL — LOW (ref 4.8–10.8)
WBC # FLD AUTO: 2.21 K/UL — LOW (ref 4.8–10.8)

## 2018-10-28 RX ORDER — CEFEPIME 1 G/1
INJECTION, POWDER, FOR SOLUTION INTRAMUSCULAR; INTRAVENOUS
Qty: 0 | Refills: 0 | Status: DISCONTINUED | OUTPATIENT
Start: 2018-10-28 | End: 2018-10-31

## 2018-10-28 RX ORDER — VANCOMYCIN HCL 1 G
1000 VIAL (EA) INTRAVENOUS EVERY 12 HOURS
Qty: 0 | Refills: 0 | Status: DISCONTINUED | OUTPATIENT
Start: 2018-10-28 | End: 2018-10-30

## 2018-10-28 RX ORDER — TRAMADOL HYDROCHLORIDE 50 MG/1
50 TABLET ORAL THREE TIMES A DAY
Qty: 0 | Refills: 0 | Status: DISCONTINUED | OUTPATIENT
Start: 2018-10-28 | End: 2018-10-31

## 2018-10-28 RX ORDER — FILGRASTIM 480MCG/1.6
380 VIAL (ML) INJECTION DAILY
Qty: 0 | Refills: 0 | Status: DISCONTINUED | OUTPATIENT
Start: 2018-10-28 | End: 2018-10-28

## 2018-10-28 RX ORDER — FILGRASTIM 480MCG/1.6
480 VIAL (ML) INJECTION DAILY
Qty: 0 | Refills: 0 | Status: DISCONTINUED | OUTPATIENT
Start: 2018-10-28 | End: 2018-10-29

## 2018-10-28 RX ORDER — CEFEPIME 1 G/1
2000 INJECTION, POWDER, FOR SOLUTION INTRAMUSCULAR; INTRAVENOUS ONCE
Qty: 0 | Refills: 0 | Status: COMPLETED | OUTPATIENT
Start: 2018-10-28 | End: 2018-10-28

## 2018-10-28 RX ORDER — CEFEPIME 1 G/1
2000 INJECTION, POWDER, FOR SOLUTION INTRAMUSCULAR; INTRAVENOUS EVERY 8 HOURS
Qty: 0 | Refills: 0 | Status: DISCONTINUED | OUTPATIENT
Start: 2018-10-28 | End: 2018-10-31

## 2018-10-28 RX ORDER — OXYCODONE AND ACETAMINOPHEN 5; 325 MG/1; MG/1
2 TABLET ORAL EVERY 4 HOURS
Qty: 0 | Refills: 0 | Status: DISCONTINUED | OUTPATIENT
Start: 2018-10-28 | End: 2018-10-31

## 2018-10-28 RX ORDER — INFLUENZA VIRUS VACCINE 15; 15; 15; 15 UG/.5ML; UG/.5ML; UG/.5ML; UG/.5ML
0.5 SUSPENSION INTRAMUSCULAR ONCE
Qty: 0 | Refills: 0 | Status: DISCONTINUED | OUTPATIENT
Start: 2018-10-28 | End: 2018-10-31

## 2018-10-28 RX ADMIN — TRAMADOL HYDROCHLORIDE 50 MILLIGRAM(S): 50 TABLET ORAL at 21:16

## 2018-10-28 RX ADMIN — OXYCODONE AND ACETAMINOPHEN 2 TABLET(S): 5; 325 TABLET ORAL at 17:16

## 2018-10-28 RX ADMIN — OXYCODONE AND ACETAMINOPHEN 2 TABLET(S): 5; 325 TABLET ORAL at 20:32

## 2018-10-28 RX ADMIN — OXYCODONE AND ACETAMINOPHEN 2 TABLET(S): 5; 325 TABLET ORAL at 21:02

## 2018-10-28 RX ADMIN — OXYCODONE AND ACETAMINOPHEN 2 TABLET(S): 5; 325 TABLET ORAL at 16:30

## 2018-10-28 RX ADMIN — ENOXAPARIN SODIUM 40 MILLIGRAM(S): 100 INJECTION SUBCUTANEOUS at 13:10

## 2018-10-28 RX ADMIN — CEFEPIME 100 MILLIGRAM(S): 1 INJECTION, POWDER, FOR SOLUTION INTRAMUSCULAR; INTRAVENOUS at 13:08

## 2018-10-28 RX ADMIN — TRAMADOL HYDROCHLORIDE 50 MILLIGRAM(S): 50 TABLET ORAL at 23:27

## 2018-10-28 RX ADMIN — TRAMADOL HYDROCHLORIDE 50 MILLIGRAM(S): 50 TABLET ORAL at 06:28

## 2018-10-28 RX ADMIN — Medication 250 MILLIGRAM(S): at 17:14

## 2018-10-28 RX ADMIN — Medication 250 MILLIGRAM(S): at 06:28

## 2018-10-28 RX ADMIN — Medication 480 MICROGRAM(S): at 13:09

## 2018-10-28 RX ADMIN — CEFEPIME 100 MILLIGRAM(S): 1 INJECTION, POWDER, FOR SOLUTION INTRAMUSCULAR; INTRAVENOUS at 21:17

## 2018-10-28 RX ADMIN — CEFEPIME 100 MILLIGRAM(S): 1 INJECTION, POWDER, FOR SOLUTION INTRAMUSCULAR; INTRAVENOUS at 04:47

## 2018-10-28 RX ADMIN — TRAMADOL HYDROCHLORIDE 50 MILLIGRAM(S): 50 TABLET ORAL at 13:08

## 2018-10-28 NOTE — PROGRESS NOTE ADULT - SUBJECTIVE AND OBJECTIVE BOX
I&D performed under local analgesia  no pus found  packing in place  cxs sent including afb and fungal, f/u  d/w Dr Patel, no indication for acute ortho intervention at this time  can eat. NPO midnight in case gets worse overnight  elevation - pillow provided  abx, ID  med mgmt I&D performed under local analgesia  no pus found  packing in place  cxs sent including afb and fungal, f/u  d/w Dr Patel, no indication for acute ortho intervention at this time  can eat. 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

## 2018-10-28 NOTE — CONSULT NOTE ADULT - SUBJECTIVE AND OBJECTIVE BOX
ADRIANNA GARZA 48yMalePatient is a 48y old  Male who presents with a chief complaint of Streaking Redness Up R Arm (28 Oct 2018 08:02)      Patient has history of:  No Known Allergies      CELLULITIS;LYMPHANGITIS  ^CELLULITIS;LYMPHANGITIS  No pertinent family history in first degree relatives  Handoff  MEWS Score  Neutropenia  Illicit drug use  Alcoholism  Cellulitis  No significant past surgical history  WEAKNESS  2  Lymphangitis        Patient treated with:  cefepime   IVPB 2000 milliGRAM(s) IV Intermittent every 8 hours  cefepime   IVPB      vancomycin  IVPB 1000 milliGRAM(s) IV Intermittent every 12 hours        PHYSICAL EXAM  T(F): 98.1 (10-28-18 @ 08:48), Max: 100.8 (10-27-18 @ 18:23)  HR: 75 (10-28-18 @ 08:48) (75 - 125)  BP: 104/67 (10-28-18 @ 08:48) (104/67 - 131/80)  RR: 18 (10-28-18 @ 08:48) (18 - 18)  SpO2: 98% (10-28-18 @ 08:48) (96% - 98%)  Daily     Daily   HEENT: normal, no nuchal rigidity  Cor: RSR Nl S1 S2  Lungs: clear  Decreased breath sounds at bases    Abdomen: Nontender, Nl BS,     Ext: Right hand and arm erythema and lymphangitis with decreased ROM 3rd finger    LAB & RADIOLOGIC RESULTS:                        10.9   2.21  )-----------( 283      ( 28 Oct 2018 06:46 )             34.3         10-27    138  |  99  |  11  ----------------------------<  115<H>  3.9   |  23  |  0.9            PT/INR - ( 28 Oct 2018 06:46 )   PT: 16.20 sec;   INR: 1.41 ratio         PTT - ( 28 Oct 2018 06:46 )  PTT:46.2 sec    Culture - Blood (collected 25 Oct 2018 17:22)  Source: .Blood Blood  Preliminary Report (27 Oct 2018 01:01):    No growth to date.

## 2018-10-28 NOTE — PROGRESS NOTE ADULT - SUBJECTIVE AND OBJECTIVE BOX
SUBJECTIVE:    Patient is a 48y old Male who presents with a chief complaint of Streaking Redness Up R Arm (28 Oct 2018 07:24)    Currently admitted to medicine with the primary diagnosis of Cellulitis     Today is hospital day 1d. This morning he is resting comfortably in bed and reports no new issues or overnight events.     PAST MEDICAL & SURGICAL HISTORY  Neutropenia  Illicit drug use  Alcoholism  No significant past surgical history    SOCIAL HISTORY:  Negative for smoking/alcohol/drug use.     ALLERGIES:  No Known Allergies    MEDICATIONS:  STANDING MEDICATIONS  cefepime   IVPB 2000 milliGRAM(s) IV Intermittent every 8 hours  cefepime   IVPB      enoxaparin Injectable 40 milliGRAM(s) SubCutaneous daily  traMADol 50 milliGRAM(s) Oral three times a day  vancomycin  IVPB 1000 milliGRAM(s) IV Intermittent every 12 hours    PRN MEDICATIONS  oxyCODONE    5 mG/acetaminophen 325 mG 2 Tablet(s) Oral every 4 hours PRN    VITALS:   T(F): 99.2  HR: 81  BP: 120/78  RR: 18  SpO2: 98%    LABS:                        10.4   3.01  )-----------( 268      ( 27 Oct 2018 20:27 )             32.4     10-27    138  |  99  |  11  ----------------------------<  115<H>  3.9   |  23  |  0.9    Ca    8.6      27 Oct 2018 20:27            Lactate, Blood: 0.8 mmol/L (10-27-18 @ 20:27)  Sedimentation Rate, Erythrocyte: 66 mm/Hr <H> (10-27-18 @ 20:00)      Culture - Blood (collected 25 Oct 2018 17:22)  Source: .Blood Blood  Preliminary Report (27 Oct 2018 01:01):    No growth to date.          RADIOLOGY:    PHYSICAL EXAM:  GEN: No acute distress  LUNGS: Clear to auscultation bilaterally   HEART: S1/S2 present. RRR.   ABD: Soft, non-tender, non-distended. Bowel sounds present  EXT: NC/NC/NE/2+PP/SILVA  NEURO: AAOX3

## 2018-10-28 NOTE — CONSULT NOTE ADULT - SUBJECTIVE AND OBJECTIVE BOX
Patient is a 48y old  Male who presents with a chief complaint of Streaking Redness Up R Arm (27 Oct 2018 23:31)      HPI:  49 yo M with PMHx of cocaine-induced neutropenia? [on prior admission, was thought to be secondary to underlying malignancy vs aplastic anemia vs Sweet's syndrome] on Neupogen, substance abuse, presents for R 3rd phalanx swelling and erythema with extension to shoulder. He had presented to the ED 2 days ago and eloped. He said symptom worsened today which prompted his visit. According to the patient, he has burned himself on a crack pipe 1 week ago. He did not use any topical ointments and simply covered it with a bandaid. He works as a  and he has been working with the open wound. When the lesion began to swell on Thursday, he developed subjective fevers, cold sweats and chills. Denies abdominal pain, CP, SOB, and N/V/D. In the ED, he received Zosyn 4.5 mg x 1, Vancomycin 1 g x 1. Hand surgery has seen the patient, performed I+D.     He occasionally uses tobacco cigars. He does cocaine regularly; last used today. Denies alcohol. (27 Oct 2018 23:31)         PAST MEDICAL & SURGICAL HISTORY:  Neutropenia  Illicit drug use  Alcoholism  No significant past surgical history      SOCIAL HISTORY: cocaine + , smoker +     FAMILY HISTORY:  No pertinent family history in first degree relatives    Allergies    No Known Allergies    Intolerances    HOME MEDICATIONS:      Vital Signs Last 24 Hrs  T(C): 37.3 (27 Oct 2018 23:59), Max: 38.2 (27 Oct 2018 18:23)  T(F): 99.2 (27 Oct 2018 23:59), Max: 100.8 (27 Oct 2018 18:23)  HR: 81 (27 Oct 2018 23:59) (81 - 125)  BP: 120/78 (27 Oct 2018 23:59) (120/78 - 131/80)  BP(mean): --  RR: 18 (27 Oct 2018 23:59) (18 - 18)  SpO2: 98% (27 Oct 2018 23:59) (96% - 98%)    PHYSICAL EXAM  General: adult in NAD  HEENT: clear oropharynx, anicteric sclera, pink conjunctiva  Neck: supple  CV: normal S1/S2 with no murmur rubs or gallops  Lungs: positive air movement b/l ant lungs,clear to auscultation, no wheezes, no rales  Abdomen: soft non-tender non-distended, no hepatosplenomegaly  Ext: no clubbing cyanosis or edema - Diffuse redness and erythema to right middle finger with streaking up to shoulder. Reduced ROM of 3rd phalanx  Skin: no rashes and no petechiae  Neuro: alert and oriented X 4, no focal deficits    MEDICATIONS  (STANDING):  cefepime   IVPB 2000 milliGRAM(s) IV Intermittent every 8 hours  cefepime   IVPB      enoxaparin Injectable 40 milliGRAM(s) SubCutaneous daily  traMADol 50 milliGRAM(s) Oral three times a day  vancomycin  IVPB 1000 milliGRAM(s) IV Intermittent every 12 hours    MEDICATIONS  (PRN):  oxyCODONE    5 mG/acetaminophen 325 mG 2 Tablet(s) Oral every 4 hours PRN Moderate Pain (4 - 6)      LABS:                          10.4   3.01  )-----------( 268      ( 27 Oct 2018 20:27 )             32.4         Mean Cell Volume : 80.4 fL  Mean Cell Hemoglobin : 25.8 pg  Mean Cell Hemoglobin Concentration : 32.1 g/dL  Auto Neutrophil # : x  Auto Lymphocyte # : x  Auto Monocyte # : x  Auto Eosinophil # : x  Auto Basophil # : x  Auto Neutrophil % : x  Auto Lymphocyte % : x  Auto Monocyte % : x  Auto Eosinophil % : x  Auto Basophil % : x      Serial CBC's  10-27 @ 20:27  Hct-32.4 / Hgb-10.4 / Plat-268 / RBC-4.03 / WBC-3.01  Serial CBC's  10-25 @ 17:22  Hct-34.2 / Hgb-10.8 / Plat-336 / RBC-4.12 / WBC-1.18      10-27    138  |  99  |  11  ----------------------------<  115<H>  3.9   |  23  |  0.9    Ca    8.6      27 Oct 2018 20:27    Culture - Blood (collected 25 Oct 2018 17:22)  Source: .Blood Blood  Preliminary Report (27 Oct 2018 01:01):    No growth to date.    RADIOLOGY & ADDITIONAL STUDIES: right hand Xray pending

## 2018-10-28 NOTE — CONSULT NOTE ADULT - ASSESSMENT
Patient well know to heme onc service for recurrent visit to ED for neutropenia after using cocaine , refused bone marrow biopsy and further studies.    # Right hand cellulitis in the setting of neutropenia  His ANC 2 days ago was 0.02.   No differential from the last cbc , however his WBC is  3000  - Would give neupogen 480 mcg daily  - Continue with ABX and follow up ortho recommendations  - Monitor cbc daily    Will follow with attending with final recommendations Patient well know to heme onc service for recurrent visit to ED for neutropenia after using cocaine , refused bone marrow biopsy and further studies.    # Right hand cellulitis in the setting of neutropenia  His ANC 2 days ago was 0.02.   No differential from the last cbc , however his WBC is  3000 . so estimated ANC lower than 1  - Give neupogen 480 mcg daily  - Continue with ABX and follow up ortho recommendations  - Monitor cbc with differential daily    Will follow with attending with final recommendations Patient well know to heme onc service for recurrent visit to ED for neutropenia after using cocaine , refused bone marrow biopsy and further studies.    # Right hand cellulitis in the setting of neutropenia  His ANC 2 days ago was 0.02.   No differential from the last cbc , however his WBC is  3000 . so estimated ANC lower than 1  - Give neupogen 480 mcg SQ daily  - Continue with ABX and follow up ortho recommendations  - Monitor cbc with differential daily    We advised the patient to stop using cocaine . He wants to stop using cocaine, but at this point he has no interest to stop and wants to address his cellulitis issue first.    Will follow daily cbc with diff Patient well know to heme onc service for recurrent visit to ED for neutropenia after using cocaine , refused bone marrow biopsy and further studies.    # Right hand cellulitis in the setting of neutropenia  His ANC 2 days ago was 0.02.   No differential from the last cbc , however his WBC is  3000 . so estimated ANC lower than 1  - Give neupogen 480 mcg SQ daily  - Continue with ABX and follow up ortho recommendations  - Monitor cbc with differential daily    We advised the patient to stop using cocaine. He wants to stop using cocaine, but at this point he has no interest to stop and wants to address his cellulitis issue first.    Will follow daily cbc with diff

## 2018-10-28 NOTE — CONSULT NOTE ADULT - ASSESSMENT
IMPRESSION  Pt with cocaine (crack) induced neutropenia, Alcoholism and right 3rd finger/arm lymphangitis. Burned self on crack pipe.  Pt works as fish-cutter (diff includes stap/strep/pseud, vibrio and M marinum)    wbc 2.21, ESR 66  MRSA PCR negative 10/2    On cefepime   IVPB 2000 milliGRAM(s) IV Intermittent every 8 hours  vancomycin  IVPB 1000 milliGRAM(s) IV Intermittent every 12 hours    Pt with alkalosis,       SUGGESTIONs  Await blood cultures, urine culture, wound culture  Hand surgery consult  Continue cefepime & Vanco  Hematology F/U  Repeat white blood cell count IMPRESSION  Pt with cocaine (crack) induced neutropenia, Alcoholism and right 3rd finger/arm lymphangitis. Burned self on crack pipe.  Pt works as fish-cutter (diff includes stap/strep/pseud, vibrio and M marinum)    wbc 2.21, ESR 66  MRSA PCR negative 10/2    On cefepime   IVPB 2000 milliGRAM(s) IV Intermittent every 8 hours  vancomycin  IVPB 1000 milliGRAM(s) IV Intermittent every 12 hours    Pt with alkalosis,       SUGGESTIONs  Await blood cultures  Wound culture  Hand surgery F/U   Please order neupogen  Continue cefepime & Vanco  Hematology F/U  Repeat white blood cell count

## 2018-10-29 LAB
ANION GAP SERPL CALC-SCNC: 10 MMOL/L — SIGNIFICANT CHANGE UP (ref 7–14)
BASOPHILS # BLD AUTO: 0.07 K/UL — SIGNIFICANT CHANGE UP (ref 0–0.2)
BASOPHILS NFR BLD AUTO: 1 % — SIGNIFICANT CHANGE UP (ref 0–1)
BUN SERPL-MCNC: 11 MG/DL — SIGNIFICANT CHANGE UP (ref 10–20)
CALCIUM SERPL-MCNC: 8.4 MG/DL — LOW (ref 8.5–10.1)
CHLORIDE SERPL-SCNC: 97 MMOL/L — LOW (ref 98–110)
CO2 SERPL-SCNC: 26 MMOL/L — SIGNIFICANT CHANGE UP (ref 17–32)
CREAT SERPL-MCNC: 1.1 MG/DL — SIGNIFICANT CHANGE UP (ref 0.7–1.5)
CRP SERPL-MCNC: 15.88 MG/DL — HIGH (ref 0–0.4)
EOSINOPHIL # BLD AUTO: 0.16 K/UL — SIGNIFICANT CHANGE UP (ref 0–0.7)
EOSINOPHIL NFR BLD AUTO: 2.3 % — SIGNIFICANT CHANGE UP (ref 0–8)
ESTIMATED AVERAGE GLUCOSE: 123 MG/DL — HIGH (ref 68–114)
GLUCOSE SERPL-MCNC: 93 MG/DL — SIGNIFICANT CHANGE UP (ref 70–99)
HBA1C BLD-MCNC: 5.9 % — HIGH (ref 4–5.6)
HCT VFR BLD CALC: 32.5 % — LOW (ref 42–52)
HGB BLD-MCNC: 10.4 G/DL — LOW (ref 14–18)
IMM GRANULOCYTES NFR BLD AUTO: 5.7 % — HIGH (ref 0.1–0.3)
LYMPHOCYTES # BLD AUTO: 0.84 K/UL — LOW (ref 1.2–3.4)
LYMPHOCYTES # BLD AUTO: 12.2 % — LOW (ref 20.5–51.1)
MAGNESIUM SERPL-MCNC: 1.9 MG/DL — SIGNIFICANT CHANGE UP (ref 1.8–2.4)
MCHC RBC-ENTMCNC: 25.9 PG — LOW (ref 27–31)
MCHC RBC-ENTMCNC: 32 G/DL — SIGNIFICANT CHANGE UP (ref 32–37)
MCV RBC AUTO: 80.8 FL — SIGNIFICANT CHANGE UP (ref 80–94)
MONOCYTES # BLD AUTO: 0.38 K/UL — SIGNIFICANT CHANGE UP (ref 0.1–0.6)
MONOCYTES NFR BLD AUTO: 5.5 % — SIGNIFICANT CHANGE UP (ref 1.7–9.3)
NEUTROPHILS # BLD AUTO: 5.05 K/UL — SIGNIFICANT CHANGE UP (ref 1.4–6.5)
NEUTROPHILS NFR BLD AUTO: 73.3 % — SIGNIFICANT CHANGE UP (ref 42.2–75.2)
NIGHT BLUE STAIN TISS: SIGNIFICANT CHANGE UP
PLATELET # BLD AUTO: 229 K/UL — SIGNIFICANT CHANGE UP (ref 130–400)
POTASSIUM SERPL-MCNC: 3.8 MMOL/L — SIGNIFICANT CHANGE UP (ref 3.5–5)
POTASSIUM SERPL-SCNC: 3.8 MMOL/L — SIGNIFICANT CHANGE UP (ref 3.5–5)
RBC # BLD: 4.02 M/UL — LOW (ref 4.7–6.1)
RBC # FLD: 17.2 % — HIGH (ref 11.5–14.5)
SODIUM SERPL-SCNC: 133 MMOL/L — LOW (ref 135–146)
SPECIMEN SOURCE: SIGNIFICANT CHANGE UP
VANCOMYCIN TROUGH SERPL-MCNC: 16.1 UG/ML — HIGH (ref 5–10)
WBC # BLD: 6.89 K/UL — SIGNIFICANT CHANGE UP (ref 4.8–10.8)
WBC # FLD AUTO: 6.89 K/UL — SIGNIFICANT CHANGE UP (ref 4.8–10.8)

## 2018-10-29 RX ADMIN — ENOXAPARIN SODIUM 40 MILLIGRAM(S): 100 INJECTION SUBCUTANEOUS at 13:20

## 2018-10-29 RX ADMIN — TRAMADOL HYDROCHLORIDE 50 MILLIGRAM(S): 50 TABLET ORAL at 13:18

## 2018-10-29 RX ADMIN — Medication 480 MICROGRAM(S): at 13:18

## 2018-10-29 RX ADMIN — OXYCODONE AND ACETAMINOPHEN 2 TABLET(S): 5; 325 TABLET ORAL at 12:20

## 2018-10-29 RX ADMIN — Medication 250 MILLIGRAM(S): at 18:47

## 2018-10-29 RX ADMIN — TRAMADOL HYDROCHLORIDE 50 MILLIGRAM(S): 50 TABLET ORAL at 05:21

## 2018-10-29 RX ADMIN — TRAMADOL HYDROCHLORIDE 50 MILLIGRAM(S): 50 TABLET ORAL at 14:09

## 2018-10-29 RX ADMIN — TRAMADOL HYDROCHLORIDE 50 MILLIGRAM(S): 50 TABLET ORAL at 06:00

## 2018-10-29 RX ADMIN — CEFEPIME 100 MILLIGRAM(S): 1 INJECTION, POWDER, FOR SOLUTION INTRAMUSCULAR; INTRAVENOUS at 21:41

## 2018-10-29 RX ADMIN — OXYCODONE AND ACETAMINOPHEN 2 TABLET(S): 5; 325 TABLET ORAL at 11:31

## 2018-10-29 RX ADMIN — CEFEPIME 100 MILLIGRAM(S): 1 INJECTION, POWDER, FOR SOLUTION INTRAMUSCULAR; INTRAVENOUS at 13:19

## 2018-10-29 RX ADMIN — TRAMADOL HYDROCHLORIDE 50 MILLIGRAM(S): 50 TABLET ORAL at 21:41

## 2018-10-29 RX ADMIN — Medication 250 MILLIGRAM(S): at 05:21

## 2018-10-29 RX ADMIN — CEFEPIME 100 MILLIGRAM(S): 1 INJECTION, POWDER, FOR SOLUTION INTRAMUSCULAR; INTRAVENOUS at 05:20

## 2018-10-29 RX ADMIN — TRAMADOL HYDROCHLORIDE 50 MILLIGRAM(S): 50 TABLET ORAL at 22:15

## 2018-10-29 NOTE — PROGRESS NOTE ADULT - SUBJECTIVE AND OBJECTIVE BOX
infectious diseases progress note:  ADRIANNA GARZA is a 48yMale patient    CELLULITIS;LYMPHANGITIS        ROS:  CONSTITUTIONAL:  Negative fever or chills, feels well, good appetite  EYES:  Negative  blurry vision or double vision  CARDIOVASCULAR:  Negative for chest pain or palpitations  RESPIRATORY:  Negative for cough, wheezing, or SOB   GASTROINTESTINAL:  Negative for nausea, vomiting, diarrhea, constipation, or abdominal pain  GENITOURINARY:  Negative frequency, urgency or dysuria  NEUROLOGIC:  No headache, confusion, dizziness, lightheadedness    Allergies    No Known Allergies    Intolerances        ANTIBIOTICS/RELEVANT:  antimicrobials  cefepime   IVPB 2000 milliGRAM(s) IV Intermittent every 8 hours  cefepime   IVPB      vancomycin  IVPB 1000 milliGRAM(s) IV Intermittent every 12 hours    immunologic:  filgrastim-sndz Injectable 480 MICROGram(s) SubCutaneous daily  influenza   Vaccine 0.5 milliLiter(s) IntraMuscular once    OTHER:  enoxaparin Injectable 40 milliGRAM(s) SubCutaneous daily  oxyCODONE    5 mG/acetaminophen 325 mG 2 Tablet(s) Oral every 4 hours PRN  traMADol 50 milliGRAM(s) Oral three times a day      Objective:  T(F): 100 (10-29-18 @ 05:30), Max: 100.7 (10-28-18 @ 20:30)  HR: 83 (10-29-18 @ 05:30) (78 - 105)  BP: 110/62 (10-29-18 @ 05:30) (100/51 - 114/60)  RR: 20 (10-29-18 @ 05:30) (18 - 20)  SpO2: 95% (10-28-18 @ 21:14) (95% - 95%)    PHYSICAL EXAM  Constitutional:Well-developed, well nourished  Eyes:BUSHRA, EOMI  Ear/Nose/Throat: no oral lesion, no sinus tenderness on percussion	  Neck:no JVD, no lymphadenopathy, supple  Respiratory: CTA vicente  Cardiovascular: S1S2 RRR, no murmurs  Gastrointestinal:soft, (+) BS, no HSM  Extremities:no e/e/c    10-29    133<L>  |  97<L>  |  11  ----------------------------<  93  3.8   |  26  |  1.1    Mg     1.9     10-29    TPro  7.2  /  Alb  3.2<L>  /  TBili  0.4  /  DBili  <0.2  /  AST  19  /  ALT  17  /  AlkPhos  58  10-28    <--<9>>1.2                          10.4   6.89  )-----------( 229      ( 29 Oct 2018 07:13 )             32.5       PT/INR - ( 28 Oct 2018 06:46 )   PT: 16.20 sec;   INR: 1.41 ratio         PTT - ( 28 Oct 2018 06:46 )  PTT:46.2 sec    Culture - Fungal, Body Fluid (collected 28 Oct 2018 13:40)  Source: .Body Fluid Interstitial Fluid  Preliminary Report (29 Oct 2018 11:13):    Testing in progress    Culture - Blood (collected 27 Oct 2018 19:14)  Source: .Blood Blood  Preliminary Report (29 Oct 2018 06:01):    No growth to date.    Culture - Blood (collected 27 Oct 2018 19:14)  Source: .Blood Blood  Preliminary Report (29 Oct 2018 06:01):    No growth to date.    Culture - Blood (collected 25 Oct 2018 17:22)  Source: .Blood Blood  Preliminary Report (27 Oct 2018 01:01):    No growth to date.

## 2018-10-29 NOTE — PROGRESS NOTE ADULT - SUBJECTIVE AND OBJECTIVE BOX
ADRIANNA GARZA  48y Male    CHIEF COMPLAINT:    Patient is a 48y old  Male who presents with a chief complaint of Streaking Redness Up R Arm (29 Oct 2018 07:35)      INTERVAL HPI/OVERNIGHT EVENTS:    Patient seen and examined. No acute events overnight. Resting in bed, no complaints    ROS: All other systems are negative.    Vital Signs:    T(F): 100 (10-29-18 @ 05:30), Max: 100.7 (10-28-18 @ 20:30)  HR: 83 (10-29-18 @ 05:30) (78 - 105)  BP: 110/62 (10-29-18 @ 05:30) (100/51 - 114/60)  RR: 20 (10-29-18 @ 05:30) (18 - 20)  SpO2: 95% (10-28-18 @ 21:14) (95% - 95%)    PHYSICAL EXAM:    GENERAL:  NAD  SKIN: pustular lesion above left nipple and in left axilla  HEENT: Atraumatic. Normocephalic.   NECK: Supple, No JVD. No lymphadenopathy.  PULMONARY: CTA B/L. No wheezing. No rales  CVS: Normal S1, S2. Rate and Rhythm are regular. No murmurs.  ABDOMEN/GI: Soft, Nontender, Nondistended; BS present  MSK:  No edema B/L LE. Right arm at baseline, no erythema/swelling/clubbing. R third digit + dressing, no discharge, swelling signifficantly impoved  NEUROLOGIC:  No motor or sensory deficit.  PSYCH: Alert & oriented x 3, normal affect    Consultant(s) Notes Reviewed:  [x ] YES  [ ] NO  Care Discussed with Consultants/Other Providers [ x] YES  [ ] NO    LABS:                        10.4   6.89  )-----------( 229      ( 29 Oct 2018 07:13 )             32.5     10-29    133<L>  |  97<L>  |  11  ----------------------------<  93  3.8   |  26  |  1.1    Ca    8.4<L>      29 Oct 2018 07:13  Phos  2.9     10-28  Mg     1.9     10-29    TPro  7.2  /  Alb  3.2<L>  /  TBili  0.4  /  DBili  <0.2  /  AST  19  /  ALT  17  /  AlkPhos  58  10-28    PT/INR - ( 28 Oct 2018 06:46 )   PT: 16.20 sec;   INR: 1.41 ratio      PTT - ( 28 Oct 2018 06:46 )  PTT:46.2 sec  Culture - Blood (collected 27 Oct 2018 19:14)  Source: .Blood Blood  Preliminary Report (29 Oct 2018 06:01):    No growth to date.  Culture - Blood (collected 27 Oct 2018 19:14)  Source: .Blood Blood  Preliminary Report (29 Oct 2018 06:01):    No growth to date.    RADIOLOGY & ADDITIONAL TESTS:    Imaging or report Personally Reviewed:  [x] YES  [ ] NO    Medications:  Standing  cefepime   IVPB 2000 milliGRAM(s) IV Intermittent every 8 hours  cefepime   IVPB      enoxaparin Injectable 40 milliGRAM(s) SubCutaneous daily  filgrastim-sndz Injectable 480 MICROGram(s) SubCutaneous daily  influenza   Vaccine 0.5 milliLiter(s) IntraMuscular once  traMADol 50 milliGRAM(s) Oral three times a day  vancomycin  IVPB 1000 milliGRAM(s) IV Intermittent every 12 hours    oxyCODONE    5 mG/acetaminophen 325 mG 2 Tablet(s) Oral every 4 hours PRN

## 2018-10-29 NOTE — PROGRESS NOTE ADULT - SUBJECTIVE AND OBJECTIVE BOX
ADRIANNA GARZA 48y Male  MRN#: [id number]   CODE STATUS: FULL    SUBJECTIVE  Patient is a 48y old Male who presents with a chief complaint of Streaking Redness Up R Arm (29 Oct 2018 13:08)  Currently admitted to medicine with the primary diagnosis of Cellulitis  Hospital course has been complicated by   Today is hospital day 2d, and this morning he is _________ and reports ________ overnight events.     Present Today:           Richardson Catheter ()No/ ()Yes? Indication:          Central Line ()No/ ()Yes? Indication:          IV Fluids ()No/ ()Yes? Type:  Rate:  Indication:    OBJECTIVE  PAST MEDICAL & SURGICAL HISTORY  Neutropenia  Illicit drug use  Alcoholism  No significant past surgical history    ALLERGIES:  No Known Allergies    MEDICATIONS:  STANDING MEDICATIONS  cefepime   IVPB 2000 milliGRAM(s) IV Intermittent every 8 hours  cefepime   IVPB      enoxaparin Injectable 40 milliGRAM(s) SubCutaneous daily  filgrastim-sndz Injectable 480 MICROGram(s) SubCutaneous daily  influenza   Vaccine 0.5 milliLiter(s) IntraMuscular once  traMADol 50 milliGRAM(s) Oral three times a day  vancomycin  IVPB 1000 milliGRAM(s) IV Intermittent every 12 hours    PRN MEDICATIONS  oxyCODONE    5 mG/acetaminophen 325 mG 2 Tablet(s) Oral every 4 hours PRN      Home Medications  Home Medications:      VITAL SIGNS: Last 24 Hours  T(C): 36.1 (29 Oct 2018 13:35), Max: 38.2 (28 Oct 2018 20:30)  T(F): 97 (29 Oct 2018 13:35), Max: 100.7 (28 Oct 2018 20:30)  HR: 68 (29 Oct 2018 13:35) (68 - 105)  BP: 103/68 (29 Oct 2018 13:35) (100/51 - 114/60)  BP(mean): --  RR: 20 (29 Oct 2018 13:35) (18 - 20)  SpO2: 95% (28 Oct 2018 21:14) (95% - 95%)    LABS:                        10.4   6.89  )-----------( 229      ( 29 Oct 2018 07:13 )             32.5     10-29    133<L>  |  97<L>  |  11  ----------------------------<  93  3.8   |  26  |  1.1    Ca    8.4<L>      29 Oct 2018 07:13  Phos  2.9     10-28  Mg     1.9     10-29    TPro  7.2  /  Alb  3.2<L>  /  TBili  0.4  /  DBili  <0.2  /  AST  19  /  ALT  17  /  AlkPhos  58  10-28    PT/INR - ( 28 Oct 2018 06:46 )   PT: 16.20 sec;   INR: 1.41 ratio         PTT - ( 28 Oct 2018 06:46 )  PTT:46.2 sec          Culture - Fungal, Body Fluid (collected 28 Oct 2018 13:40)  Source: .Body Fluid Interstitial Fluid  Preliminary Report (29 Oct 2018 11:13):    Testing in progress    Culture - Blood (collected 27 Oct 2018 19:14)  Source: .Blood Blood  Preliminary Report (29 Oct 2018 06:01):    No growth to date.    Culture - Blood (collected 27 Oct 2018 19:14)  Source: .Blood Blood  Preliminary Report (29 Oct 2018 06:01):    No growth to date.          RADIOLOGY:    PHYSICAL EXAM:    GENERAL: NAD, well-developed, AAOx3  HEENT:  Atraumatic, Normocephalic. EOMI, PERRLA, conjunctiva and sclera clear, No JVD  PULMONARY: Clear to auscultation bilaterally; No wheeze  CARDIOVASCULAR: Regular rate and rhythm; No murmurs, rubs, or gallops  GASTROINTESTINAL: Soft, Nontender, Nondistended; Bowel sounds present  MUSCULOSKELETAL:  2+ Peripheral Pulses, No clubbing, cyanosis, or edema  NEUROLOGY: non-focal  SKIN: No rashes or lesions ADRIANNA GARZA 48y Male  MRN#: [id number]   CODE STATUS: FULL    SUBJECTIVE  Patient is a 48y old Male who presents with a chief complaint of Streaking Redness Up R Arm (29 Oct 2018 13:08)  Currently admitted to medicine with the primary diagnosis of Cellulitis    Today is hospital day 2d, and this morning he is doing better         OBJECTIVE  PAST MEDICAL & SURGICAL HISTORY  Neutropenia  Illicit drug use  Alcoholism  No significant past surgical history    ALLERGIES:  No Known Allergies    MEDICATIONS:  STANDING MEDICATIONS  cefepime   IVPB 2000 milliGRAM(s) IV Intermittent every 8 hours  cefepime   IVPB      enoxaparin Injectable 40 milliGRAM(s) SubCutaneous daily  filgrastim-sndz Injectable 480 MICROGram(s) SubCutaneous daily  influenza   Vaccine 0.5 milliLiter(s) IntraMuscular once  traMADol 50 milliGRAM(s) Oral three times a day  vancomycin  IVPB 1000 milliGRAM(s) IV Intermittent every 12 hours    PRN MEDICATIONS  oxyCODONE    5 mG/acetaminophen 325 mG 2 Tablet(s) Oral every 4 hours PRN      Home Medications  Home Medications:      VITAL SIGNS: Last 24 Hours  T(C): 36.1 (29 Oct 2018 13:35), Max: 38.2 (28 Oct 2018 20:30)  T(F): 97 (29 Oct 2018 13:35), Max: 100.7 (28 Oct 2018 20:30)  HR: 68 (29 Oct 2018 13:35) (68 - 105)  BP: 103/68 (29 Oct 2018 13:35) (100/51 - 114/60)  BP(mean): --  RR: 20 (29 Oct 2018 13:35) (18 - 20)  SpO2: 95% (28 Oct 2018 21:14) (95% - 95%)    LABS:                        10.4   6.89  )-----------( 229      ( 29 Oct 2018 07:13 )             32.5     10-29    133<L>  |  97<L>  |  11  ----------------------------<  93  3.8   |  26  |  1.1    Ca    8.4<L>      29 Oct 2018 07:13  Phos  2.9     10-28  Mg     1.9     10-29    TPro  7.2  /  Alb  3.2<L>  /  TBili  0.4  /  DBili  <0.2  /  AST  19  /  ALT  17  /  AlkPhos  58  10-28    PT/INR - ( 28 Oct 2018 06:46 )   PT: 16.20 sec;   INR: 1.41 ratio         PTT - ( 28 Oct 2018 06:46 )  PTT:46.2 sec          Culture - Fungal, Body Fluid (collected 28 Oct 2018 13:40)  Source: .Body Fluid Interstitial Fluid  Preliminary Report (29 Oct 2018 11:13):    Testing in progress    Culture - Blood (collected 27 Oct 2018 19:14)  Source: .Blood Blood  Preliminary Report (29 Oct 2018 06:01):    No growth to date.    Culture - Blood (collected 27 Oct 2018 19:14)  Source: .Blood Blood  Preliminary Report (29 Oct 2018 06:01):    No growth to date.          RADIOLOGY:    PHYSICAL EXAM:    GENERAL: NAD, well-developed, AAOx3  HEENT:  Atraumatic, Normocephalic. EOMI, PERRLA, conjunctiva and sclera clear, No JVD  PULMONARY: Clear to auscultation bilaterally; No wheeze  CARDIOVASCULAR: Regular rate and rhythm; No murmurs, rubs, or gallops  GASTROINTESTINAL: Soft, Nontender, Nondistended; Bowel sounds present  MUSCULOSKELETAL:  2+ Peripheral Pulses, No clubbing, cyanosis, or edema  NEUROLOGY: non-focal  SKIN: No rashes or lesions ADRIANNA GARZA 48y Male  MRN#: [id number]   CODE STATUS: FULL    SUBJECTIVE  Patient is a 48y old Male who presents with a chief complaint of Streaking Redness Up R Arm (29 Oct 2018 13:08)  Currently admitted to medicine with the primary diagnosis of Cellulitis    Today is hospital day 2d, and this morning he is doing better his right shoulder and forearm swelling improved , he denies any chills any fatigue, his finger is still swollen with open healing wound   he admits to still use actively cocaine       OBJECTIVE  PAST MEDICAL & SURGICAL HISTORY  Neutropenia  Illicit drug use  Alcoholism  No significant past surgical history    ALLERGIES:  No Known Allergies    MEDICATIONS:  STANDING MEDICATIONS  cefepime   IVPB 2000 milliGRAM(s) IV Intermittent every 8 hours  cefepime   IVPB      enoxaparin Injectable 40 milliGRAM(s) SubCutaneous daily  filgrastim-sndz Injectable 480 MICROGram(s) SubCutaneous daily  influenza   Vaccine 0.5 milliLiter(s) IntraMuscular once  traMADol 50 milliGRAM(s) Oral three times a day  vancomycin  IVPB 1000 milliGRAM(s) IV Intermittent every 12 hours    PRN MEDICATIONS  oxyCODONE    5 mG/acetaminophen 325 mG 2 Tablet(s) Oral every 4 hours PRN      Home Medications  Home Medications:      VITAL SIGNS: Last 24 Hours  T(C): 36.1 (29 Oct 2018 13:35), Max: 38.2 (28 Oct 2018 20:30)  T(F): 97 (29 Oct 2018 13:35), Max: 100.7 (28 Oct 2018 20:30)  HR: 68 (29 Oct 2018 13:35) (68 - 105)  BP: 103/68 (29 Oct 2018 13:35) (100/51 - 114/60)  BP(mean): --  RR: 20 (29 Oct 2018 13:35) (18 - 20)  SpO2: 95% (28 Oct 2018 21:14) (95% - 95%)    LABS:                        10.4   6.89  )-----------( 229      ( 29 Oct 2018 07:13 )             32.5     10-29    133<L>  |  97<L>  |  11  ----------------------------<  93  3.8   |  26  |  1.1    Ca    8.4<L>      29 Oct 2018 07:13  Phos  2.9     10-28  Mg     1.9     10-29    TPro  7.2  /  Alb  3.2<L>  /  TBili  0.4  /  DBili  <0.2  /  AST  19  /  ALT  17  /  AlkPhos  58  10-28    PT/INR - ( 28 Oct 2018 06:46 )   PT: 16.20 sec;   INR: 1.41 ratio         PTT - ( 28 Oct 2018 06:46 )  PTT:46.2 sec          Culture - Fungal, Body Fluid (collected 28 Oct 2018 13:40)  Source: .Body Fluid Interstitial Fluid  Preliminary Report (29 Oct 2018 11:13):    Testing in progress    Culture - Blood (collected 27 Oct 2018 19:14)  Source: .Blood Blood  Preliminary Report (29 Oct 2018 06:01):    No growth to date.    Culture - Blood (collected 27 Oct 2018 19:14)  Source: .Blood Blood  Preliminary Report (29 Oct 2018 06:01):    No growth to date.          RADIOLOGY:  - Xray: < from: Xray Hand 3 Views, Right (10.27.18 @ 20:28) >  impression:No acutely displaced fracture or malalignment. Diffuse third   finger swelling.     Unchanged third MCP sesamoid. Unchanged ulnar styloid accessory   ossification with deformity. No radiopaque foreign body      PHYSICAL EXAM:    GENERAL: NAD, well-developed, AAOx3  HEENT:  Atraumatic, Normocephalic. EOMI, PERRLA, conjunctiva and sclera clear, No JVD  PULMONARY: Clear to auscultation bilaterally; No wheeze  CARDIOVASCULAR: Regular rate and rhythm; No murmurs, rubs, or gallops  GASTROINTESTINAL: Soft, Nontender, Nondistended; Bowel sounds present  MUSCULOSKELETAL:    swollen right phalanx with open wound, no pus draining , decreased erythema in forearm   2+ Peripheral Pulses, No clubbing, cyanosis, or edema  NEUROLOGY: non-focal ADRIANNA GARZA 48y Male  MRN#: [id number]   CODE STATUS: FULL    SUBJECTIVE  Patient is a 48y old Male who presents with a chief complaint of Streaking Redness Up R Arm (29 Oct 2018 13:08)  Currently admitted to medicine with the primary diagnosis of Cellulitis    Today is hospital day 2d, and this morning he is doing better his right shoulder and forearm swelling improved , he denies any chills any fatigue, his finger is still swollen with open healing wound   he admits to still actively use cocaine       OBJECTIVE  PAST MEDICAL & SURGICAL HISTORY  Neutropenia  Illicit drug use  Alcoholism  No significant past surgical history    ALLERGIES:  No Known Allergies    MEDICATIONS:  STANDING MEDICATIONS  cefepime   IVPB 2000 milliGRAM(s) IV Intermittent every 8 hours  cefepime   IVPB      enoxaparin Injectable 40 milliGRAM(s) SubCutaneous daily  filgrastim-sndz Injectable 480 MICROGram(s) SubCutaneous daily  influenza   Vaccine 0.5 milliLiter(s) IntraMuscular once  traMADol 50 milliGRAM(s) Oral three times a day  vancomycin  IVPB 1000 milliGRAM(s) IV Intermittent every 12 hours    PRN MEDICATIONS  oxyCODONE    5 mG/acetaminophen 325 mG 2 Tablet(s) Oral every 4 hours PRN      Home Medications  Home Medications:      VITAL SIGNS: Last 24 Hours  T(C): 36.1 (29 Oct 2018 13:35), Max: 38.2 (28 Oct 2018 20:30)  T(F): 97 (29 Oct 2018 13:35), Max: 100.7 (28 Oct 2018 20:30)  HR: 68 (29 Oct 2018 13:35) (68 - 105)  BP: 103/68 (29 Oct 2018 13:35) (100/51 - 114/60)  BP(mean): --  RR: 20 (29 Oct 2018 13:35) (18 - 20)  SpO2: 95% (28 Oct 2018 21:14) (95% - 95%)    LABS:                        10.4   6.89  )-----------( 229      ( 29 Oct 2018 07:13 )             32.5     10-29    133<L>  |  97<L>  |  11  ----------------------------<  93  3.8   |  26  |  1.1    Ca    8.4<L>      29 Oct 2018 07:13  Phos  2.9     10-28  Mg     1.9     10-29    TPro  7.2  /  Alb  3.2<L>  /  TBili  0.4  /  DBili  <0.2  /  AST  19  /  ALT  17  /  AlkPhos  58  10-28    PT/INR - ( 28 Oct 2018 06:46 )   PT: 16.20 sec;   INR: 1.41 ratio         PTT - ( 28 Oct 2018 06:46 )  PTT:46.2 sec          Culture - Fungal, Body Fluid (collected 28 Oct 2018 13:40)  Source: .Body Fluid Interstitial Fluid  Preliminary Report (29 Oct 2018 11:13):    Testing in progress    Culture - Blood (collected 27 Oct 2018 19:14)  Source: .Blood Blood  Preliminary Report (29 Oct 2018 06:01):    No growth to date.    Culture - Blood (collected 27 Oct 2018 19:14)  Source: .Blood Blood  Preliminary Report (29 Oct 2018 06:01):    No growth to date.          RADIOLOGY:  - Xray: < from: Xray Hand 3 Views, Right (10.27.18 @ 20:28) >  impression:No acutely displaced fracture or malalignment. Diffuse third   finger swelling.     Unchanged third MCP sesamoid. Unchanged ulnar styloid accessory   ossification with deformity. No radiopaque foreign body      PHYSICAL EXAM:    GENERAL: NAD, well-developed, AAOx3  HEENT:  Atraumatic, Normocephalic. EOMI, PERRLA, conjunctiva and sclera clear, No JVD  PULMONARY: Clear to auscultation bilaterally; No wheeze  CARDIOVASCULAR: Regular rate and rhythm; No murmurs, rubs, or gallops  GASTROINTESTINAL: Soft, Nontender, Nondistended; Bowel sounds present  MUSCULOSKELETAL:    swollen right phalanx with open wound, no pus draining , decreased erythema in forearm   2+ Peripheral Pulses, No clubbing, cyanosis, or edema  NEUROLOGY: non-focal

## 2018-10-29 NOTE — PROGRESS NOTE ADULT - SUBJECTIVE AND OBJECTIVE BOX
seen and examined  feels much better  dutifully elevating hand  dressing and packing removed  no tracking erythema  swelling improved  very small amount of pus expressed  started on soaks    d/w Dr Patel, no indication for acute ortho intervention at this time    a/p: R 3rd digit infection  can eat. no need for OR at this point as long as improving  elevation - pillow provided  abx, ID  med mgmt    PT HANDLES FISH AT WORK - CONSIDER [employer] COVERAGE    soaks: order placed for nursing. TID soaks in warm water for 30 minutes each, then dry gauze and kerlex/frank dressing seen and examined  feels much better  dutifully elevating hand  dressing and packing removed  no tracking erythema  swelling improved  very small amount of pus expressed  started on soaks    d/w Dr Patel, no indication for acute ortho intervention at this time    a/p: R 3rd digit infection  can eat. no need for OR at this point as long as improving  elevation - pillow provided  abx, ID  med mgmt    PT HANDLES FISH AT WORK - CONSIDER [employer] COVERAGE    soaks: order placed for nursing. TID soaks in warm water for 30 minutes each, then dry gauze and kerlex/frank dressing    ROM encouraged

## 2018-10-30 ENCOUNTER — TRANSCRIPTION ENCOUNTER (OUTPATIENT)
Age: 48
End: 2018-10-30

## 2018-10-30 VITALS
DIASTOLIC BLOOD PRESSURE: 61 MMHG | TEMPERATURE: 98 F | SYSTOLIC BLOOD PRESSURE: 107 MMHG | HEART RATE: 70 BPM | RESPIRATION RATE: 18 BRPM

## 2018-10-30 LAB
-  AMIKACIN: SIGNIFICANT CHANGE UP
-  AMOXICILLIN/CLAVULANIC ACID: SIGNIFICANT CHANGE UP
-  AMPICILLIN/SULBACTAM: SIGNIFICANT CHANGE UP
-  AMPICILLIN: SIGNIFICANT CHANGE UP
-  AZTREONAM: SIGNIFICANT CHANGE UP
-  CEFAZOLIN: SIGNIFICANT CHANGE UP
-  CEFEPIME: SIGNIFICANT CHANGE UP
-  CEFOXITIN: SIGNIFICANT CHANGE UP
-  CEFTRIAXONE: SIGNIFICANT CHANGE UP
-  CIPROFLOXACIN: SIGNIFICANT CHANGE UP
-  ERTAPENEM: SIGNIFICANT CHANGE UP
-  GENTAMICIN: SIGNIFICANT CHANGE UP
-  IMIPENEM: SIGNIFICANT CHANGE UP
-  LEVOFLOXACIN: SIGNIFICANT CHANGE UP
-  MEROPENEM: SIGNIFICANT CHANGE UP
-  PIPERACILLIN/TAZOBACTAM: SIGNIFICANT CHANGE UP
-  TOBRAMYCIN: SIGNIFICANT CHANGE UP
-  TRIMETHOPRIM/SULFAMETHOXAZOLE: SIGNIFICANT CHANGE UP
ANION GAP SERPL CALC-SCNC: 13 MMOL/L — SIGNIFICANT CHANGE UP (ref 7–14)
ANISOCYTOSIS BLD QL: SLIGHT — SIGNIFICANT CHANGE UP
BASOPHILS # BLD AUTO: 0.16 K/UL — SIGNIFICANT CHANGE UP (ref 0–0.2)
BASOPHILS NFR BLD AUTO: 0.8 % — SIGNIFICANT CHANGE UP (ref 0–1)
BUN SERPL-MCNC: 10 MG/DL — SIGNIFICANT CHANGE UP (ref 10–20)
CALCIUM SERPL-MCNC: 8.3 MG/DL — LOW (ref 8.5–10.1)
CHLORIDE SERPL-SCNC: 100 MMOL/L — SIGNIFICANT CHANGE UP (ref 98–110)
CO2 SERPL-SCNC: 26 MMOL/L — SIGNIFICANT CHANGE UP (ref 17–32)
CREAT SERPL-MCNC: 1 MG/DL — SIGNIFICANT CHANGE UP (ref 0.7–1.5)
EOSINOPHIL # BLD AUTO: 0.53 K/UL — SIGNIFICANT CHANGE UP (ref 0–0.7)
EOSINOPHIL NFR BLD AUTO: 2.6 % — SIGNIFICANT CHANGE UP (ref 0–8)
GLUCOSE SERPL-MCNC: 97 MG/DL — SIGNIFICANT CHANGE UP (ref 70–99)
HCT VFR BLD CALC: 34.9 % — LOW (ref 42–52)
HGB BLD-MCNC: 11.1 G/DL — LOW (ref 14–18)
LYMPHOCYTES # BLD AUTO: 11.1 % — LOW (ref 20.5–51.1)
LYMPHOCYTES # BLD AUTO: 2.27 K/UL — SIGNIFICANT CHANGE UP (ref 1.2–3.4)
MANUAL SMEAR VERIFICATION: SIGNIFICANT CHANGE UP
MCHC RBC-ENTMCNC: 25.7 PG — LOW (ref 27–31)
MCHC RBC-ENTMCNC: 31.8 G/DL — LOW (ref 32–37)
MCV RBC AUTO: 80.8 FL — SIGNIFICANT CHANGE UP (ref 80–94)
METAMYELOCYTES # FLD: 0.9 % — HIGH (ref 0–0)
METHOD TYPE: SIGNIFICANT CHANGE UP
MONOCYTES # BLD AUTO: 0.69 K/UL — HIGH (ref 0.1–0.6)
MONOCYTES NFR BLD AUTO: 3.4 % — SIGNIFICANT CHANGE UP (ref 1.7–9.3)
MYELOCYTES NFR BLD: 1.7 % — HIGH (ref 0–0)
NEUTROPHILS # BLD AUTO: 15.55 K/UL — HIGH (ref 1.4–6.5)
NEUTROPHILS NFR BLD AUTO: 43.6 % — SIGNIFICANT CHANGE UP (ref 42.2–75.2)
NEUTS BAND # BLD: 32.5 % — HIGH (ref 0–6)
NRBC # BLD: 2 /100 — HIGH (ref 0–0)
NRBC # BLD: SIGNIFICANT CHANGE UP /100 WBCS (ref 0–0)
PLAT MORPH BLD: NORMAL — SIGNIFICANT CHANGE UP
PLATELET # BLD AUTO: 236 K/UL — SIGNIFICANT CHANGE UP (ref 130–400)
POTASSIUM SERPL-MCNC: 4.1 MMOL/L — SIGNIFICANT CHANGE UP (ref 3.5–5)
POTASSIUM SERPL-SCNC: 4.1 MMOL/L — SIGNIFICANT CHANGE UP (ref 3.5–5)
PROMYELOCYTES # FLD: 0.8 % — HIGH (ref 0–0)
RBC # BLD: 4.32 M/UL — LOW (ref 4.7–6.1)
RBC # FLD: 17.4 % — HIGH (ref 11.5–14.5)
RBC BLD AUTO: NORMAL — SIGNIFICANT CHANGE UP
SODIUM SERPL-SCNC: 139 MMOL/L — SIGNIFICANT CHANGE UP (ref 135–146)
VARIANT LYMPHS # BLD: 2.6 % — SIGNIFICANT CHANGE UP (ref 0–5)
WBC # BLD: 20.44 K/UL — HIGH (ref 4.8–10.8)
WBC # FLD AUTO: 20.44 K/UL — HIGH (ref 4.8–10.8)

## 2018-10-30 RX ADMIN — CEFEPIME 100 MILLIGRAM(S): 1 INJECTION, POWDER, FOR SOLUTION INTRAMUSCULAR; INTRAVENOUS at 05:55

## 2018-10-30 RX ADMIN — TRAMADOL HYDROCHLORIDE 50 MILLIGRAM(S): 50 TABLET ORAL at 14:23

## 2018-10-30 RX ADMIN — TRAMADOL HYDROCHLORIDE 50 MILLIGRAM(S): 50 TABLET ORAL at 22:10

## 2018-10-30 RX ADMIN — TRAMADOL HYDROCHLORIDE 50 MILLIGRAM(S): 50 TABLET ORAL at 06:00

## 2018-10-30 RX ADMIN — TRAMADOL HYDROCHLORIDE 50 MILLIGRAM(S): 50 TABLET ORAL at 21:40

## 2018-10-30 RX ADMIN — Medication 250 MILLIGRAM(S): at 05:55

## 2018-10-30 RX ADMIN — ENOXAPARIN SODIUM 40 MILLIGRAM(S): 100 INJECTION SUBCUTANEOUS at 11:33

## 2018-10-30 RX ADMIN — CEFEPIME 100 MILLIGRAM(S): 1 INJECTION, POWDER, FOR SOLUTION INTRAMUSCULAR; INTRAVENOUS at 21:40

## 2018-10-30 RX ADMIN — TRAMADOL HYDROCHLORIDE 50 MILLIGRAM(S): 50 TABLET ORAL at 05:55

## 2018-10-30 RX ADMIN — CEFEPIME 100 MILLIGRAM(S): 1 INJECTION, POWDER, FOR SOLUTION INTRAMUSCULAR; INTRAVENOUS at 14:23

## 2018-10-30 NOTE — DISCHARGE NOTE ADULT - HOSPITAL COURSE
47 yo M with PMHx of cocaine-induced neutropenia? [on prior admission, was thought to be secondary to underlying malignancy vs aplastic anemia vs Sweet's syndrome] on Neupogen, substance abuse, presents for R 3rd phalanx swelling and erythema with extension to shoulder.    #) Acute R 3rd digit ulceration with overlying cellulitis and lymphangiitis  - ESR 66, Lactate 1.2  - Hand Surgery: Started on broad spectrum antibiotics. underwent I and drainage yesterday  patient improving no need for OR today   - Xray: < from: Xray Hand 3 Views, Right (10.27.18 @ 20:28) >  impression:No acutely displaced fracture or malalignment. 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 patient refusing b.m. biopsy   today ANC>3000- stopped neupogen     #) Normocytic anemia likely 2/2 chronic disease, stable, asymptomatic  - Baseline Hb 10.5    #) Substance Abuse  - Follow-up [facility]    #) GI ppx: Not indicated  #) DVT ppx: Lovenox QD    #) Activity: Ambulate as tolerated  #) Diet: NPO except for medications    #) Dispo: From Home  #) Full Code 49 yo M with PMHx of cocaine-induced neutropenia? [on prior admission, was thought to be secondary to underlying malignancy vs aplastic anemia vs Sweet's syndrome] on Neupogen, substance abuse, presents for R 3rd phalanx swelling and erythema with extension to shoulder.    #) Acute R 3rd digit ulceration with overlying cellulitis and lymphangiitis  - ESR 66, Lactate 1.2  - Hand Surgery: Started on broad spectrum antibiotics. underwent I and drainage yesterday  patient improving no need for OR today   - Xray: < from: Xray Hand 3 Views, Right (10.27.18 @ 20:28) >  impression:No acutely displaced fracture or malalignment. 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 patient refusing b.m. biopsy   today ANC>3000- stopped neupogen   follow up [facility]     #) Normocytic anemia likely 2/2 chronic disease, stable, asymptomatic  - Baseline Hb 10.5    #) Substance Abuse  - Follow-up [facility]    #) GI ppx: Not indicated  #) DVT ppx: Lovenox QD    #) Activity: Ambulate as tolerated  #) Diet: NPO except for medications    #) Dispo: From Home  #) Full Code    counseled extensively on need to stop cocaine 49 yo M with PMHx of cocaine-induced neutropenia? [on prior admission, was thought to be secondary to underlying malignancy vs aplastic anemia vs Sweet's syndrome] on Neupogen, substance abuse, presents for R 3rd phalanx swelling and erythema with extension to shoulder.    #) Acute R 3rd digit ulceration with overlying cellulitis and lymphangiitis  - ESR 66, Lactate 1.2  - Hand Surgery: Started on broad spectrum antibiotics. underwent I and drainage yesterday  patient improving no need for OR today   - Xray: < from: Xray Hand 3 Views, Right (10.27.18 @ 20:28) >  impression:No acutely displaced fracture or malalignment. 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 patient refusing b.m. biopsy   today ANC>3000- stopped neupogen   follow up with [facility]     #) Normocytic anemia likely 2/2 chronic disease, stable, asymptomatic  - Baseline Hb 10.5    #) Substance Abuse  - Follow-up [facility]    #) GI ppx: Not indicated  #) DVT ppx: Lovenox QD    #) Activity: Ambulate as tolerated  #) Diet: NPO except for medications    #) Dispo: From Home  #) Full Code    counseled extensively on need to stop cocaine  32 minutes spent on discharge planning

## 2018-10-30 NOTE — DISCHARGE NOTE ADULT - ADDITIONAL INSTRUCTIONS
follow up with [facility] clinic at [place] [address] on nov 5 at 12:30 pm

## 2018-10-30 NOTE — PROGRESS NOTE ADULT - SUBJECTIVE AND OBJECTIVE BOX
ADRIANNA GARZA  48y Male    CHIEF COMPLAINT:    Patient is a 48y old  Male who presents with a chief complaint of Streaking Redness Up R Arm (30 Oct 2018 11:10)      INTERVAL HPI/OVERNIGHT EVENTS:    Patient seen and examined. No acute events overnight. C/o mild pain from 3rd right digit    ROS: All other systems are negative.    Vital Signs:    T(F): 98.5 (10-30-18 @ 05:15), Max: 98.5 (10-30-18 @ 05:15)  HR: 72 (10-30-18 @ 05:15) (68 - 94)  BP: 155/82 (10-30-18 @ 05:15) (103/68 - 155/82)  RR: 18 (10-30-18 @ 05:15) (18 - 20)  SpO2: --  I&O's Summary    30 Oct 2018 07:01  -  30 Oct 2018 11:45  --------------------------------------------------------  IN: 0 mL / OUT: 120 mL / NET: -120 mL      PHYSICAL EXAM:    GENERAL:  NAD  SKIN: pustular lesion above left nipple and in left axilla  HEENT: Atraumatic. Normocephalic.  NECK: Supple, No JVD. No lymphadenopathy.  PULMONARY: CTA B/L. No wheezing. No rales  CVS: Normal S1, S2. Rate and Rhythm are regular. No murmurs.  ABDOMEN/GI: Soft, Nontender, Nondistended; BS present  MSK:  No edema B/L LE. Right 3rd finger swollen and tender to touch, improving  NEUROLOGIC:  No motor or sensory deficit.  PSYCH: Alert & oriented x 3, normal affect    Consultant(s) Notes Reviewed:  [x ] YES  [ ] NO  Care Discussed with Consultants/Other Providers [ x] YES  [ ] NO    LABS:                        11.1   20.44 )-----------( 236      ( 30 Oct 2018 07:14 )             34.9     10-30    139  |  100  |  10  ----------------------------<  97  4.1   |  26  |  1.0    Ca    8.3<L>      30 Oct 2018 07:14  Mg     1.9     10-29  Culture - Abscess with Gram Stain (collected 28 Oct 2018 13:40)  Source: .Abscess Arm - Left  Preliminary Report (29 Oct 2018 19:59):    Few Morganella morganii    Culture - Abscess with Gram Stain (collected 28 Oct 2018 13:40)  Source: .Abscess Arm - Left  Preliminary Report (29 Oct 2018 20:01):    Few Morganella morganii    See previous culture [id number]    Culture - Fungal, Body Fluid (collected 28 Oct 2018 13:40)  Source: .Body Fluid Interstitial Fluid  Preliminary Report (29 Oct 2018 11:13):    Testing in progress    Culture - Acid Fast - Body Fluid w/Smear (collected 28 Oct 2018 13:40)  Source: .Body Fluid Interstitial Fluid    Culture - Blood (collected 28 Oct 2018 06:46)  Source: .Blood None  Preliminary Report (29 Oct 2018 17:01):    No growth to date.    Culture - Blood (collected 27 Oct 2018 19:14)  Source: .Blood Blood  Preliminary Report (29 Oct 2018 06:01):    No growth to date.    Culture - Blood (collected 27 Oct 2018 19:14)  Source: .Blood Blood  Preliminary Report (29 Oct 2018 06:01):    No growth to date.        RADIOLOGY & ADDITIONAL TESTS:      Imaging or report Personally Reviewed:  [x] YES  [ ] NO  EKG reviewed: [x] YES  [ ] NO    Medications:  Standing  cefepime   IVPB 2000 milliGRAM(s) IV Intermittent every 8 hours  cefepime   IVPB      enoxaparin Injectable 40 milliGRAM(s) SubCutaneous daily  influenza   Vaccine 0.5 milliLiter(s) IntraMuscular once  traMADol 50 milliGRAM(s) Oral three times a day    PRN Meds  oxyCODONE    5 mG/acetaminophen 325 mG 2 Tablet(s) Oral every 4 hours PRN

## 2018-10-30 NOTE — DISCHARGE NOTE ADULT - PATIENT PORTAL LINK FT
You can access the [employer] [facility] Patient Portal, offered by [facility], by registering with the following website: [email or web address]

## 2018-10-30 NOTE — DISCHARGE NOTE ADULT - CARE PROVIDER_API CALL
[facility],   [address] [place] [address]   [address]   [place] [address]  Phone: [phone]  Fax: [phone]

## 2018-10-30 NOTE — DISCHARGE NOTE ADULT - PLAN OF CARE
continue Antibiotic continue antibiotic for follow up with hem/onc  cannot stress how important it is to stop cocaine counseled about the emergent  need to stop illicit drug abuse  in light of infection and neutropenia

## 2018-10-30 NOTE — PROGRESS NOTE ADULT - SUBJECTIVE AND OBJECTIVE BOX
GREG ADRIANNA  48y, Male      OVERNIGHT EVENTS:  wcx with morganella    ROS negative except as per above    VITALS:  T(F): 98.9, Max: 98.9 (10-30-18 @ 13:27)  HR: 74  BP: 116/67  RR: 18Vital Signs Last 24 Hrs  T(C): 37.2 (30 Oct 2018 13:27), Max: 37.2 (30 Oct 2018 13:27)  T(F): 98.9 (30 Oct 2018 13:27), Max: 98.9 (30 Oct 2018 13:27)  HR: 74 (30 Oct 2018 15:21) (65 - 94)  BP: 116/67 (30 Oct 2018 15:21) (85/51 - 155/82)  BP(mean): --  RR: 18 (30 Oct 2018 13:27) (18 - 20)  SpO2: 96% (30 Oct 2018 15:21) (96% - 96%)    PHYSICAL EXAM  Gen: Awake and alert, non-toxic appearing, NAD  HEENT: NCAT. EOMI. MMM.   Extr: wwp, finger with dressing, decreased erythema of arm  Skin: no rash  Neuro: No focal deficits  Lines: clean      TESTS & MEASUREMENTS:                        11.1   20.44 )-----------( 236      ( 30 Oct 2018 07:14 )             34.9     10-30    139  |  100  |  10  ----------------------------<  97  4.1   |  26  |  1.0    Ca    8.3<L>      30 Oct 2018 07:14  Mg     1.9     10-29          Culture - Abscess with Gram Stain (collected 10-28-18 @ 13:40)  Source: .Abscess Arm - Left  Preliminary Report (10-29-18 @ 19:59):    Few Morganella morganii    Culture - Abscess with Gram Stain (collected 10-28-18 @ 13:40)  Source: .Abscess Arm - Left  Preliminary Report (10-29-18 @ 20:01):    Few Morganella morganii    See previous culture [id number]    Culture - Fungal, Body Fluid (collected 10-28-18 @ 13:40)  Source: .Body Fluid Interstitial Fluid  Preliminary Report (10-29-18 @ 11:13):    Testing in progress    Culture - Acid Fast - Body Fluid w/Smear (collected 10-28-18 @ 13:40)  Source: .Body Fluid Interstitial Fluid    Culture - Blood (collected 10-28-18 @ 06:46)  Source: .Blood None  Preliminary Report (10-29-18 @ 17:01):    No growth to date.    Culture - Blood (collected 10-27-18 @ 19:14)  Source: .Blood Blood  Preliminary Report (10-29-18 @ 06:01):    No growth to date.    Culture - Blood (collected 10-27-18 @ 19:14)  Source: .Blood Blood  Preliminary Report (10-29-18 @ 06:01):    No growth to date.    Culture - Blood (collected 10-25-18 @ 17:22)  Source: .Blood Blood  Preliminary Report (10-27-18 @ 01:01):    No growth to date.          RADIOLOGY & ADDITIONAL TESTS:    ANTIBIOTICS:  cefepime   IVPB   100 mL/Hr IV Intermittent (10-28-18 @ 04:47)    cefepime   IVPB   100 mL/Hr IV Intermittent (10-30-18 @ 14:23)   100 mL/Hr IV Intermittent (10-30-18 @ 05:55)   100 mL/Hr IV Intermittent (10-29-18 @ 21:41)   100 mL/Hr IV Intermittent (10-29-18 @ 13:19)   100 mL/Hr IV Intermittent (10-29-18 @ 05:20)   100 mL/Hr IV Intermittent (10-28-18 @ 21:17)   100 mL/Hr IV Intermittent (10-28-18 @ 13:08)    piperacillin/tazobactam IVPB.   200 mL/Hr IV Intermittent (10-27-18 @ 22:16)    vancomycin  IVPB   250 mL/Hr IV Intermittent (10-27-18 @ 20:22)    vancomycin  IVPB   250 mL/Hr IV Intermittent (10-30-18 @ 05:55)   250 mL/Hr IV Intermittent (10-29-18 @ 18:47)   250 mL/Hr IV Intermittent (10-29-18 @ 05:21)   250 mL/Hr IV Intermittent (10-28-18 @ 17:14)   250 mL/Hr IV Intermittent (10-28-18 @ 06:28)        cefepime   IVPB 2000 milliGRAM(s) IV Intermittent every 8 hours  cefepime   IVPB

## 2018-10-30 NOTE — PROGRESS NOTE ADULT - SUBJECTIVE AND OBJECTIVE BOX
Patient seen and examined this AM. Feels much better. Reports doing TID soaks as instructed.    Vital Signs Last 24 Hrs  T(C): 36.9 (30 Oct 2018 05:15), Max: 36.9 (30 Oct 2018 05:15)  T(F): 98.5 (30 Oct 2018 05:15), Max: 98.5 (30 Oct 2018 05:15)  HR: 72 (30 Oct 2018 05:15) (68 - 94)  BP: 155/82 (30 Oct 2018 05:15) (103/68 - 155/82)  BP(mean): --  RR: 18 (30 Oct 2018 05:15) (18 - 20)  SpO2: --    NAD  redness along arm resolved  marked reduction in finger swelling  able to range w/out pain, moving PIP well still stiff at DIP  no expressible pus  NVID                          11.1   20.44 )-----------( 236      ( 30 Oct 2018 07:14 )             34.9       Cx growing few morganella morganni    A/P: 48M s/p bedside I&D of R 3rd digit infection  - continue abx per ID, cultures now showing some growth  - contineu TID soaks then dry dressings  - c/w elevation  - ROM encouraged, patient shown how to do ROM at each joint  - care per medical team

## 2018-10-30 NOTE — DISCHARGE NOTE ADULT - PROVIDER TOKENS
FREE:[LAST:[[facility]],PHONE:[phone]],FAX:[phone]],ADDRESS:[[address] [place] [address]]]

## 2018-10-30 NOTE — DISCHARGE NOTE ADULT - MEDICATION SUMMARY - MEDICATIONS TO TAKE
I will START or STAY ON the medications listed below when I get home from the hospital:    ciprofloxacin 500 mg oral tablet  -- 1 tab(s) by mouth every 12 hours  -- Indication: For Cellulitis

## 2018-10-30 NOTE — PROGRESS NOTE ADULT - ATTENDING COMMENTS
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 d/c soon?
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

## 2018-10-30 NOTE — PROGRESS NOTE ADULT - SUBJECTIVE AND OBJECTIVE BOX
ADRIANNA GARZA 48y Male  MRN#: [id number]   CODE STATUS: FULL    SUBJECTIVE  Patient is a 48y old Male who presents with a chief complaint of Streaking Redness Up R Arm (29 Oct 2018 13:08)  Currently admitted to medicine with the primary diagnosis of Cellulitis    Today is hospital day 3d, and this morning he is doing better his right shoulder and forearm swelling and redness resolved ,   he denies any chills any fatigue, his finger is still swollen with open healing wound   he admits to still actively use cocaine       OBJECTIVE  PAST MEDICAL & SURGICAL HISTORY  Neutropenia  Illicit drug use  Alcoholism  No significant past surgical history    ALLERGIES:  No Known Allergies    MEDICATIONS:  STANDING MEDICATIONS  cefepime   IVPB 2000 milliGRAM(s) IV Intermittent every 8 hours  cefepime   IVPB      enoxaparin Injectable 40 milliGRAM(s) SubCutaneous daily  filgrastim-sndz Injectable 480 MICROGram(s) SubCutaneous daily  influenza   Vaccine 0.5 milliLiter(s) IntraMuscular once  traMADol 50 milliGRAM(s) Oral three times a day  vancomycin  IVPB 1000 milliGRAM(s) IV Intermittent every 12 hours    PRN MEDICATIONS  oxyCODONE    5 mG/acetaminophen 325 mG 2 Tablet(s) Oral every 4 hours PRN      Home Medications  Home Medications:      VITAL SIGNS: Last 24 Hours  Vital Signs Last 24 Hrs  T(C): 37.2 (30 Oct 2018 13:27), Max: 37.2 (30 Oct 2018 13:27)  T(F): 98.9 (30 Oct 2018 13:27), Max: 98.9 (30 Oct 2018 13:27)  HR: 74 (30 Oct 2018 15:21) (65 - 94)  BP: 116/67 (30 Oct 2018 15:21) (85/51 - 155/82)  BP(mean): --  RR: 18 (30 Oct 2018 13:27) (18 - 20)  SpO2: 96% (30 Oct 2018 15:21) (96% - 96%)    LABS:                                11.1   20.44 )-----------( 236      ( 30 Oct 2018 07:14 )             34.9     10-30    139  |  100  |  10  ----------------------------<  97  4.1   |  26  |  1.0    Ca    8.3<L>      30 Oct 2018 07:14  Mg     1.9     10-29        TPro  7.2  /  Alb  3.2<L>  /  TBili  0.4  /  DBili  <0.2  /  AST  19  /  ALT  17  /  AlkPhos  58  10-28    PT/INR - ( 28 Oct 2018 06:46 )   PT: 16.20 sec;   INR: 1.41 ratio         PTT - ( 28 Oct 2018 06:46 )  PTT:46.2 sec          Culture - Fungal, Body Fluid (collected 28 Oct 2018 13:40)  Source: .Body Fluid Interstitial Fluid  Preliminary Report (29 Oct 2018 11:13):    Testing in progress    Culture - Blood (collected 27 Oct 2018 19:14)  Source: .Blood Blood  Preliminary Report (29 Oct 2018 06:01):    No growth to date.    Culture - Blood (collected 27 Oct 2018 19:14)  Source: .Blood Blood  Preliminary Report (29 Oct 2018 06:01):    No growth to date.        RADIOLOGY:  - Xray: < from: Xray Hand 3 Views, Right (10.27.18 @ 20:28) >  impression:No acutely displaced fracture or malalignment. Diffuse third   finger swelling.     Unchanged third MCP sesamoid. Unchanged ulnar styloid accessory   ossification with deformity. No radiopaque foreign body      PHYSICAL EXAM:    GENERAL: NAD, well-developed, AAOx3  HEENT:  Atraumatic, Normocephalic. EOMI, PERRLA, conjunctiva and sclera clear, No JVD  PULMONARY: Clear to auscultation bilaterally; No wheeze  CARDIOVASCULAR: Regular rate and rhythm; No murmurs, rubs, or gallops  GASTROINTESTINAL: Soft, Nontender, Nondistended; Bowel sounds present  MUSCULOSKELETAL:    swollen right phalanx with open wound, no pus draining , decreased erythema in forearm   2+ Peripheral Pulses, No clubbing, cyanosis, or edema  NEUROLOGY: non-focal

## 2018-10-30 NOTE — DISCHARGE NOTE ADULT - CARE PLAN
Principal Discharge DX:	Cellulitis  Goal:	continue Antibiotic  Assessment and plan of treatment:	continue antibiotic for  Secondary Diagnosis:	Neutropenia  Assessment and plan of treatment:	follow up with hem/onc  cannot stress how important it is to stop cocaine  Secondary Diagnosis:	Illicit drug use  Assessment and plan of treatment:	counseled about the emergent  need to stop illicit drug abuse  in light of infection and neutropenia

## 2018-10-31 ENCOUNTER — APPOINTMENT (OUTPATIENT)
Dept: HEMATOLOGY ONCOLOGY | Facility: CLINIC | Age: 48
End: 2018-10-31

## 2018-10-31 LAB
-  AMPICILLIN/SULBACTAM: SIGNIFICANT CHANGE UP
-  CEFAZOLIN: SIGNIFICANT CHANGE UP
-  CLINDAMYCIN: SIGNIFICANT CHANGE UP
-  ERYTHROMYCIN: SIGNIFICANT CHANGE UP
-  GENTAMICIN: SIGNIFICANT CHANGE UP
-  OXACILLIN: SIGNIFICANT CHANGE UP
-  PENICILLIN: SIGNIFICANT CHANGE UP
-  RIFAMPIN: SIGNIFICANT CHANGE UP
-  TETRACYCLINE: SIGNIFICANT CHANGE UP
-  TRIMETHOPRIM/SULFAMETHOXAZOLE: SIGNIFICANT CHANGE UP
-  VANCOMYCIN: SIGNIFICANT CHANGE UP
ANION GAP SERPL CALC-SCNC: 12 MMOL/L — SIGNIFICANT CHANGE UP (ref 7–14)
BUN SERPL-MCNC: 10 MG/DL — SIGNIFICANT CHANGE UP (ref 10–20)
CALCIUM SERPL-MCNC: 8.5 MG/DL — SIGNIFICANT CHANGE UP (ref 8.5–10.1)
CHLORIDE SERPL-SCNC: 101 MMOL/L — SIGNIFICANT CHANGE UP (ref 98–110)
CO2 SERPL-SCNC: 27 MMOL/L — SIGNIFICANT CHANGE UP (ref 17–32)
CREAT SERPL-MCNC: 0.9 MG/DL — SIGNIFICANT CHANGE UP (ref 0.7–1.5)
CULTURE RESULTS: SIGNIFICANT CHANGE UP
GLUCOSE SERPL-MCNC: 102 MG/DL — HIGH (ref 70–99)
HCT VFR BLD CALC: 34.4 % — LOW (ref 42–52)
HGB BLD-MCNC: 10.7 G/DL — LOW (ref 14–18)
MCHC RBC-ENTMCNC: 25.4 PG — LOW (ref 27–31)
MCHC RBC-ENTMCNC: 31.1 G/DL — LOW (ref 32–37)
MCV RBC AUTO: 81.7 FL — SIGNIFICANT CHANGE UP (ref 80–94)
METHOD TYPE: SIGNIFICANT CHANGE UP
NRBC # BLD: 0 /100 WBCS — SIGNIFICANT CHANGE UP (ref 0–0)
PLATELET # BLD AUTO: 224 K/UL — SIGNIFICANT CHANGE UP (ref 130–400)
POTASSIUM SERPL-MCNC: 4.4 MMOL/L — SIGNIFICANT CHANGE UP (ref 3.5–5)
POTASSIUM SERPL-SCNC: 4.4 MMOL/L — SIGNIFICANT CHANGE UP (ref 3.5–5)
RBC # BLD: 4.21 M/UL — LOW (ref 4.7–6.1)
RBC # FLD: 17.8 % — HIGH (ref 11.5–14.5)
SODIUM SERPL-SCNC: 140 MMOL/L — SIGNIFICANT CHANGE UP (ref 135–146)
SPECIMEN SOURCE: SIGNIFICANT CHANGE UP
WBC # BLD: 9.25 K/UL — SIGNIFICANT CHANGE UP (ref 4.8–10.8)
WBC # FLD AUTO: 9.25 K/UL — SIGNIFICANT CHANGE UP (ref 4.8–10.8)

## 2018-10-31 RX ORDER — AZTREONAM 2 G
1 VIAL (EA) INJECTION
Qty: 20 | Refills: 0
Start: 2018-10-31 | End: 2018-11-09

## 2018-10-31 RX ORDER — CIPROFLOXACIN LACTATE 400MG/40ML
1 VIAL (ML) INTRAVENOUS
Qty: 14 | Refills: 0 | OUTPATIENT
Start: 2018-10-31 | End: 2018-11-06

## 2018-10-31 RX ADMIN — CEFEPIME 100 MILLIGRAM(S): 1 INJECTION, POWDER, FOR SOLUTION INTRAMUSCULAR; INTRAVENOUS at 05:57

## 2018-10-31 RX ADMIN — TRAMADOL HYDROCHLORIDE 50 MILLIGRAM(S): 50 TABLET ORAL at 05:57

## 2018-10-31 NOTE — PROGRESS NOTE ADULT - SUBJECTIVE AND OBJECTIVE BOX
ADRIANNA GARZA  48y  Male      Patient is a 48y old  Male who presents with a chief complaint of Streaking Redness Up R Arm (31 Oct 2018 08:27)      INTERVAL HPI/OVERNIGHT EVENTS:  no cp, sob, abd pain, fever    Vital Signs Last 24 Hrs  T(C): 36.7 (30 Oct 2018 21:18), Max: 37.2 (30 Oct 2018 13:27)  T(F): 98 (30 Oct 2018 21:18), Max: 98.9 (30 Oct 2018 13:27)  HR: 70 (30 Oct 2018 21:18) (65 - 74)  BP: 107/61 (30 Oct 2018 21:18) (85/51 - 116/67)  BP(mean): --  RR: 18 (30 Oct 2018 21:18) (18 - 18)  SpO2: 96% (30 Oct 2018 15:21) (96% - 96%)    PHYSICAL EXAM:  GENERAL: NAD, well-groomed, well-developed  HEAD:  Atraumatic, Normocephalic    CHEST/LUNG: Clear to auscultation bilaterally; No rales, rhonchi, wheezing, or rubs  HEART: Regular rate and rhythm; No murmurs, rubs, or gallops  ABDOMEN: Soft, Nontender, Nondistended; Bowel sounds present  EXTREMITIES:  2+ Peripheral Pulses, No clubbing, cyanosis, or edema      Consultant(s) Notes Reviewed:  [x ] YES  [ ] NO  Care Discussed with Consultants/Other Providers [ x] YES  [ ] NO    LABS:                        10.7   9.25  )-----------( 224      ( 31 Oct 2018 07:29 )             34.4     10-31    140  |  101  |  10  ----------------------------<  102<H>  4.4   |  27  |  0.9    Ca    8.5      31 Oct 2018 07:29            CAPILLARY BLOOD GLUCOSE          RADIOLOGY & ADDITIONAL TESTS:    Imaging Personally Reviewed:  [ ] YES  [ ] NO

## 2018-10-31 NOTE — PROGRESS NOTE ADULT - PROVIDER SPECIALTY LIST ADULT
Infectious Disease
Internal Medicine
Orthopedics
Internal Medicine

## 2018-10-31 NOTE — PROGRESS NOTE ADULT - SUBJECTIVE AND OBJECTIVE BOX
ADRIANNA GARZA  48y, Male      OVERNIGHT EVENTS:  wbc 20 after filgastrim  afebrile    ROS negative except as per above    VITALS:  T(F): 98, Max: 98.9 (10-30-18 @ 13:27)  HR: 70  BP: 107/61  RR: 18Vital Signs Last 24 Hrs  T(C): 36.7 (30 Oct 2018 21:18), Max: 37.2 (30 Oct 2018 13:27)  T(F): 98 (30 Oct 2018 21:18), Max: 98.9 (30 Oct 2018 13:27)  HR: 70 (30 Oct 2018 21:18) (65 - 74)  BP: 107/61 (30 Oct 2018 21:18) (85/51 - 116/67)  BP(mean): --  RR: 18 (30 Oct 2018 21:18) (18 - 18)  SpO2: 96% (30 Oct 2018 15:21) (96% - 96%)    PHYSICAL EXAM  Gen: Awake and alert, non-toxic appearing, NAD  HEENT: NCAT. EOMI. MMM.   Extr: wwp, finger with dressing, decreased erythema of arm  Skin: no rash  Neuro: No focal deficits  Lines: clean      TESTS & MEASUREMENTS:                        11.1   20.44 )-----------( 236      ( 30 Oct 2018 07:14 )             34.9     10-30    139  |  100  |  10  ----------------------------<  97  4.1   |  26  |  1.0    Ca    8.3<L>      30 Oct 2018 07:14          Culture - Abscess with Gram Stain (collected 10-28-18 @ 13:40)  Source: .Abscess Arm - Left  Preliminary Report (10-30-18 @ 20:24):    Few Morganella morganii    Few Alpha hemolytic strep "Susceptibilities not performed"  Organism: Morganella morganii (10-30-18 @ 18:47)  Organism: Morganella morganii (10-30-18 @ 18:47)      -  Amikacin: S <=8      -  Amoxicillin/Clavulanic Acid: R >16/8      -  Ampicillin: R >16 These ampicillin results predict results for amoxicillin      -  Ampicillin/Sulbactam: R >16/8      -  Aztreonam: S <=4      -  Cefazolin: R >16      -  Cefepime: S <=2      -  Cefoxitin: S <=4      -  Ceftriaxone: S <=1 Enterobacter, Citrobacter, and Serratia may develop resistance during prolonged therapy      -  Ciprofloxacin: S <=0.5      -  Ertapenem: S <=0.5      -  Gentamicin: S <=1      -  Imipenem: R 4      -  Levofloxacin: S <=1      -  Meropenem: S <=1      -  Piperacillin/Tazobactam: S <=8      -  Tobramycin: S <=2      -  Trimethoprim/Sulfamethoxazole: S <=0.5/9.5      Method Type: LUCINDA    Culture - Abscess with Gram Stain (collected 10-28-18 @ 13:40)  Source: .Abscess Arm - Left  Preliminary Report (10-30-18 @ 20:21):    Few Morganella morganii    See previous culture [id number]    Few Alpha hemolytic strep "Susceptibilities not performed"    Rare Staphylococcus aureus    Culture - Fungal, Body Fluid (collected 10-28-18 @ 13:40)  Source: .Body Fluid Interstitial Fluid  Preliminary Report (10-29-18 @ 11:13):    Testing in progress    Culture - Acid Fast - Body Fluid w/Smear (collected 10-28-18 @ 13:40)  Source: .Body Fluid Interstitial Fluid    Culture - Blood (collected 10-28-18 @ 06:46)  Source: .Blood None  Preliminary Report (10-29-18 @ 17:01):    No growth to date.    Culture - Blood (collected 10-27-18 @ 19:14)  Source: .Blood Blood  Preliminary Report (10-29-18 @ 06:01):    No growth to date.    Culture - Blood (collected 10-27-18 @ 19:14)  Source: .Blood Blood  Preliminary Report (10-29-18 @ 06:01):    No growth to date.    Culture - Blood (collected 10-25-18 @ 17:22)  Source: .Blood Blood  Final Report (10-31-18 @ 01:01):    No growth at 5 days.          RADIOLOGY & ADDITIONAL TESTS:    ANTIBIOTICS:  cefepime   IVPB   100 mL/Hr IV Intermittent (10-28-18 @ 04:47)    cefepime   IVPB   100 mL/Hr IV Intermittent (10-31-18 @ 05:57)   100 mL/Hr IV Intermittent (10-30-18 @ 21:40)   100 mL/Hr IV Intermittent (10-30-18 @ 14:23)   100 mL/Hr IV Intermittent (10-30-18 @ 05:55)   100 mL/Hr IV Intermittent (10-29-18 @ 21:41)   100 mL/Hr IV Intermittent (10-29-18 @ 13:19)   100 mL/Hr IV Intermittent (10-29-18 @ 05:20)   100 mL/Hr IV Intermittent (10-28-18 @ 21:17)   100 mL/Hr IV Intermittent (10-28-18 @ 13:08)    piperacillin/tazobactam IVPB.   200 mL/Hr IV Intermittent (10-27-18 @ 22:16)    vancomycin  IVPB   250 mL/Hr IV Intermittent (10-27-18 @ 20:22)    vancomycin  IVPB   250 mL/Hr IV Intermittent (10-30-18 @ 05:55)   250 mL/Hr IV Intermittent (10-29-18 @ 18:47)   250 mL/Hr IV Intermittent (10-29-18 @ 05:21)   250 mL/Hr IV Intermittent (10-28-18 @ 17:14)   250 mL/Hr IV Intermittent (10-28-18 @ 06:28)        cefepime   IVPB 2000 milliGRAM(s) IV Intermittent every 8 hours  cefepime   IVPB

## 2018-10-31 NOTE — PROGRESS NOTE ADULT - REASON FOR ADMISSION
Streaking Redness Up R Arm

## 2018-10-31 NOTE — PROGRESS NOTE ADULT - ASSESSMENT
49 yo M with PMHx of cocaine-induced neutropenia on Neupogen, substance abuse, presents for R 3rd phalanx swelling and erythema with extension to shoulder, which he initially burnt on a crack piper followed by subsequent repeated raw fish handling    ·	 Acute R 3rd digit wound/with surrounding cellulitis and lymphangitis, extending to entire arm.  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 Patient states that he gets these lesions after an infection and after he uses cocaine  -start hurricane mouth solution  -refused heme/on workup in the past  -Dr Sesay following    ·	Normocytic anemia likely 2/2 chronic disease  -at baseline, c/w daily CBC  -10/4: Iron 17, %sat 11, TIBC 155, Ferritin 1787. B12/Folate/LDH normal, Hapto 367    ·	Substance Abuse  -counseling provided, patient not ready to quit at this time    Dvt PPX: encourage ambulation    Dispo: Full code from home
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  - ESR 66, Lactate 1.2  -cellulitis resolved. 3rd digit swelling improving  - s/p I&D by surgery x2 with packing  -c/w dressing changes: TID soaks in warm water for 30 minutes each, then dry gauze and kerlex/frank dressing  - c/w Tramadol 50 mg Q8H, Percocet PRN for pain control, patient feels well on it  -Wound cx + morganella, awaiting sensitivities. Blood cultures negative  - c/w Cefepime 2 g Q8H, to switch to po once sensitivities result    ·	Cocaine-induced neutropenia?  - Admission WBC 3.01, now 20   -off neupogen   - will need [facility] follow up once discharged.     ·	Normocytic anemia likely 2/2 chronic disease  -at baseline    ·	Substance Abuse  -counseling provided, patient not ready to quit at this time    Dvt PPX: encourage ambulation    Dispo: Full code from home. Patient has an appt at the [facility] clinic 11/5/18 at 12:30 to establish care  Anticipate discharge home tomorrow    Case discussed with patient and resident
48M    Chronic neutropenia of unclear etiology ?cocaine induced, ?sweets ?behcets?  S/P repeat I&D wcx with morganella, alpha hemolytic strep    PLAN  - when ready for d/c, change to bactrim 1DS tab PO BID to finish 10 days (end 11/5)     Spectra [id number]
48M PMHx neutropenia on neupogen, substance abuse here for R 3rd phalanx ulceration s/p I&D.    1. Digit ulcer/ cellulitis, R 3rd phalanx  cultures with moraxella  plan for d/c today on bactrim per ID  s/p I&D no further intervention per hand  needs f/u hand outpt  2. Neutropenia  improved s/p Neupogen  outpt f/u  3. Anemia  stable  4. Substance abuse  counseled on cessation  outpt f/u  5. DVT ppx  d/c today
49 yo M with PMHx of cocaine-induced neutropenia? [on prior admission, was thought to be secondary to underlying malignancy vs aplastic anemia vs Sweet's syndrome] on Neupogen, substance abuse, presents for R 3rd phalanx swelling and erythema with extension to shoulder.    #) Acute R 3rd digit ulceration with overlying cellulitis and lymphangiitis  - ESR 66, Lactate 1.2  - Hand Surgery: Start on broad spectrum antibiotics. NPO at midnight. Strict extremity icing/elevation. Will reevaluate in AM for need for surgical intervention  - Follow-up official XR Hand 3-views read  - Pain Control: Tramadol 50 mg Q8H, Percocet PRN Mod  - Follow-up ID   - Start on Cefepime 2 g Q8H and Vancomycin 1 g BID    #) Cocaine-induced neutropenia? vs 2/2 malignancy vs Sweet's Syndrome  - Admission WBC 3.01  - He says that he gets weekly [facility]ogen shots at [facility]; however, there are no recent records on [place]  - Follow-up Hematology     #) Normocytic anemia likely 2/2 chronic disease, stable, asymptomatic  - Baseline Hb 10.5    #) Substance Abuse  - Follow-up [facility]    #) GI ppx: Not indicated  #) DVT ppx: Lovenox QD    #) Activity: Ambulate as tolerated  #) Diet: NPO except for medications    #) Dispo: From Home  #) Full Code
49 yo M with PMHx of cocaine-induced neutropenia? [on prior admission, was thought to be secondary to underlying malignancy vs aplastic anemia vs Sweet's syndrome] on Neupogen, substance abuse, presents for R 3rd phalanx swelling and erythema with extension to shoulder.    #) Acute R 3rd digit ulceration with overlying cellulitis and lymphangiitis  - ESR 66, Lactate 1.2  - Hand Surgery: Started on broad spectrum antibiotics. underwent I and drainage yesterday  patient improving no need for OR today   - Xray: < from: Xray Hand 3 Views, Right (10.27.18 @ 20:28) >  impression:No acutely displaced fracture or malalignment. Diffuse third   finger swelling.no foreigh body   Id following: continue cefepime 2 g every 8 hrs/ stop vancomycin  -follow up cx, prelim  +ve for morgani   f/u sensitivities as per ID tomorrow will decide on ABx and discharge     #) Cocaine-induced neutropenia? vs 2/2 malignancy vs Sweet's Syndrome  - Admission WBC 3.01  - Follow-up Hematology :cocaine induced neutropenia. patient refusing b.m. biopsy   today ANC>3000- stopped neupogen     #) Normocytic anemia likely 2/2 chronic disease, stable, asymptomatic  - Baseline Hb 10.5    #) Substance Abuse  - Follow-up [facility]    #) GI ppx: Not indicated  #) DVT ppx: Lovenox QD    #) Activity: Ambulate as tolerated  #) Diet: NPO except for medications    #) Dispo: From Home  #) Full Code
On cefepime   IVPB 2000 milliGRAM(s) IV Intermittent every 8 hours  vancomycin  IVPB 1000 milliGRAM(s) IV Intermittent every 12h    wbc higher on neupogen    S/P repeat I&D    PLAN  Await wound C&S  Called micro to confirm that sample is being processed for mycobacteria R/O M marinum  Continue current antibiotics
On cefepime   IVPB 2000 milliGRAM(s) IV Intermittent every 8 hours  vancomycin  IVPB 1000 milliGRAM(s) IV Intermittent every 12h    wbc higher on neupogen, unclear etiology of neutropenia ?cocaine induced, ?sweets ?behcets?  S/P repeat I&D wcx with morganella    PLAN  D/C vanc  Cont cefepime  f/u morganella wcx sensi, can likely d/c on cipro to finish 7 days
49 yo M with PMHx of cocaine-induced neutropenia? [on prior admission, was thought to be secondary to underlying malignancy vs aplastic anemia vs Sweet's syndrome] on Neupogen, substance abuse, presents for R 3rd phalanx swelling and erythema with extension to shoulder.    #) Acute R 3rd digit ulceration with overlying cellulitis and lymphangiitis  - ESR 66, Lactate 1.2  - Hand Surgery: Started on broad spectrum antibiotics. underwent I and drainage yesterday  patient improving no need for OR today   - Xray: < from: Xray Hand 3 Views, Right (10.27.18 @ 20:28) >  impression:No acutely displaced fracture or malalignment. 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 patient refusing b.m. biopsy   today ANC>3000- stopped neupogen     #) Normocytic anemia likely 2/2 chronic disease, stable, asymptomatic  - Baseline Hb 10.5    #) Substance Abuse  - Follow-up [facility]    #) GI ppx: Not indicated  #) DVT ppx: Lovenox QD    #) Activity: Ambulate as tolerated  #) Diet: NPO except for medications    #) Dispo: From Home  #) Full Code

## 2018-11-01 LAB
CULTURE RESULTS: SIGNIFICANT CHANGE UP
CULTURE RESULTS: SIGNIFICANT CHANGE UP
ORGANISM # SPEC MICROSCOPIC CNT: SIGNIFICANT CHANGE UP
SPECIMEN SOURCE: SIGNIFICANT CHANGE UP
SPECIMEN SOURCE: SIGNIFICANT CHANGE UP

## 2018-11-02 LAB
CULTURE RESULTS: SIGNIFICANT CHANGE UP
SPECIMEN SOURCE: SIGNIFICANT CHANGE UP

## 2018-11-06 DIAGNOSIS — L03.011 CELLULITIS OF RIGHT FINGER: ICD-10-CM

## 2018-11-06 DIAGNOSIS — D63.8 ANEMIA IN OTHER CHRONIC DISEASES CLASSIFIED ELSEWHERE: ICD-10-CM

## 2018-11-06 DIAGNOSIS — F14.188 COCAINE ABUSE WITH OTHER COCAINE-INDUCED DISORDER: ICD-10-CM

## 2018-11-06 DIAGNOSIS — L03.113 CELLULITIS OF RIGHT UPPER LIMB: ICD-10-CM

## 2018-11-06 DIAGNOSIS — E87.3 ALKALOSIS: ICD-10-CM

## 2018-11-06 DIAGNOSIS — D70.8 OTHER NEUTROPENIA: ICD-10-CM

## 2018-11-06 DIAGNOSIS — B96.89 OTHER SPECIFIED BACTERIAL AGENTS AS THE CAUSE OF DISEASES CLASSIFIED ELSEWHERE: ICD-10-CM

## 2018-11-06 DIAGNOSIS — L98.499 NON-PRESSURE CHRONIC ULCER OF SKIN OF OTHER SITES WITH UNSPECIFIED SEVERITY: ICD-10-CM

## 2018-11-06 NOTE — ED ADULT NURSE NOTE - NS ED NOTE  TALK SOMEONE YN
RF request from Carlos Rubi for Warfarin 6 MG #90. Please advise. Future appt:     Your appointments     Date & Time Appointment Department [facility])    Nov 08, 2018 10:00 AM CST Anti-Coag with EMG [address] [place] [address],[place]. [id number],
No

## 2018-11-07 ENCOUNTER — CHART COPY (OUTPATIENT)
Age: 48
End: 2018-11-07

## 2018-11-07 ENCOUNTER — APPOINTMENT (OUTPATIENT)
Dept: INTERNAL MEDICINE | Facility: CLINIC | Age: 48
End: 2018-11-07

## 2018-11-23 NOTE — H&P ADULT - NSHPLANGLIMITEDENGLISH_GEN_A_CORE
Vanessa Butterfield is requesting a refill on the following medications:   Requested Prescriptions     Pending Prescriptions Disp Refills    tazarotene (AVAGE) 0.1 % cream [Pharmacy Med Name: TAZAROTENE 0.1% CREAM] 30 g 2     Sig: apply topically every evening       Last OV 5/22/18    Future Appointments  Date Time Provider Zane Schaefer   12/3/2018 11:30 AM Dewayne Servin MD  derm [facility]   12/18/2018 1:30 PM [facility]   1/7/2019 2:15 PM Piper Abebe MD [facility] FP [facility]   1/22/2019 10:45 AM Yunior Daley MD Resp Spec Clide Mutters
No

## 2018-11-28 LAB
CULTURE RESULTS: SIGNIFICANT CHANGE UP
SPECIMEN SOURCE: SIGNIFICANT CHANGE UP

## 2018-12-19 LAB
CULTURE RESULTS: SIGNIFICANT CHANGE UP
SPECIMEN SOURCE: SIGNIFICANT CHANGE UP

## 2019-01-09 ENCOUNTER — EMERGENCY (EMERGENCY)
Facility: HOSPITAL | Age: 49
LOS: 0 days | Discharge: HOME | End: 2019-01-09
Admitting: PHYSICIAN ASSISTANT

## 2019-01-09 VITALS
OXYGEN SATURATION: 100 % | RESPIRATION RATE: 20 BRPM | TEMPERATURE: 97 F | SYSTOLIC BLOOD PRESSURE: 121 MMHG | DIASTOLIC BLOOD PRESSURE: 75 MMHG | HEART RATE: 85 BPM

## 2019-01-09 DIAGNOSIS — K12.2 CELLULITIS AND ABSCESS OF MOUTH: ICD-10-CM

## 2019-01-09 DIAGNOSIS — R22.0 LOCALIZED SWELLING, MASS AND LUMP, HEAD: ICD-10-CM

## 2019-01-09 DIAGNOSIS — J02.9 ACUTE PHARYNGITIS, UNSPECIFIED: ICD-10-CM

## 2019-01-09 RX ORDER — AMOXICILLIN 250 MG/5ML
1 SUSPENSION, RECONSTITUTED, ORAL (ML) ORAL
Qty: 20 | Refills: 0
Start: 2019-01-09 | End: 2019-01-18

## 2019-01-09 RX ORDER — DEXAMETHASONE 0.5 MG/5ML
10 ELIXIR ORAL ONCE
Qty: 0 | Refills: 0 | Status: COMPLETED | OUTPATIENT
Start: 2019-01-09 | End: 2019-01-09

## 2019-01-09 RX ADMIN — Medication 10 MILLIGRAM(S): at 10:21

## 2019-01-09 NOTE — ED PROVIDER NOTE - PROGRESS NOTE DETAILS
Patient advised to take Benadryl when he gets home. Not given in ED as patient will be driving home.

## 2019-01-09 NOTE — ED PROVIDER NOTE - OBJECTIVE STATEMENT
49 yo M here for sore throat since yesterday. +stuffy nose/congestion. + pain with swallowing. No fevers,  SOB, or cough.

## 2019-01-09 NOTE — ED PROVIDER NOTE - PHYSICAL EXAMINATION
Gen: Alert, NAD, well appearing  Head: NC, AT, PERRL, EOMI, normal lids/conjunctiva  ENT: normal hearing, patent oropharynx with erythema. +swelling to uvula.  No exudate. No trismus/drooling. No swelling to lips, tongue, or pharynx  Neck: +supple, no tenderness/meningismus,  Pulm: Bilateral BS, normal resp effort, no wheeze/stridor/retractions  CV: RRR, no murmer  Abd: soft, NT/ND, No guarding or rebound  Mskel: no edema/erythema/cyanosis  Skin: no rash, warm/dry  Neuro: AAOx3, no sensory/motor deficits

## 2019-01-09 NOTE — ED PROVIDER NOTE - NS ED ROS FT
Review of Systems    Constitutional: (-) fever, (-) chills  Eyes/ENT: (-) blurry vision, (-) epistaxis, (+) sorethroat  Cardiovascular: (-) chest pain, (-) syncope  Respiratory: (-) cough, (-) shortness of breath  Gastrointestinal: (-) pain, (-) nausea, (-) vomiting, (-) diarrhea  Musculoskeletal: (-) neck pain, (-) back pain, (-) joint pain  Integumentary: (-) rash, (-) edema  Neurological: (-) headache, (-) altered mental status

## 2019-01-28 ENCOUNTER — EMERGENCY (EMERGENCY)
Facility: HOSPITAL | Age: 49
LOS: 0 days | Discharge: HOME | End: 2019-01-28
Attending: EMERGENCY MEDICINE | Admitting: EMERGENCY MEDICINE

## 2019-01-28 VITALS
SYSTOLIC BLOOD PRESSURE: 121 MMHG | OXYGEN SATURATION: 98 % | RESPIRATION RATE: 18 BRPM | DIASTOLIC BLOOD PRESSURE: 68 MMHG | TEMPERATURE: 98 F | HEART RATE: 75 BPM

## 2019-01-28 VITALS
TEMPERATURE: 98 F | OXYGEN SATURATION: 97 % | SYSTOLIC BLOOD PRESSURE: 117 MMHG | DIASTOLIC BLOOD PRESSURE: 64 MMHG | HEART RATE: 77 BPM | RESPIRATION RATE: 20 BRPM

## 2019-01-28 DIAGNOSIS — Z79.2 LONG TERM (CURRENT) USE OF ANTIBIOTICS: ICD-10-CM

## 2019-01-28 DIAGNOSIS — K13.70 UNSPECIFIED LESIONS OF ORAL MUCOSA: ICD-10-CM

## 2019-01-28 DIAGNOSIS — D70.2 OTHER DRUG-INDUCED AGRANULOCYTOSIS: ICD-10-CM

## 2019-01-28 LAB
ALBUMIN SERPL ELPH-MCNC: 4 G/DL — SIGNIFICANT CHANGE UP (ref 3.5–5.2)
ALP SERPL-CCNC: 75 U/L — SIGNIFICANT CHANGE UP (ref 30–115)
ALT FLD-CCNC: 18 U/L — SIGNIFICANT CHANGE UP (ref 0–41)
ANION GAP SERPL CALC-SCNC: 18 MMOL/L — HIGH (ref 7–14)
AST SERPL-CCNC: 19 U/L — SIGNIFICANT CHANGE UP (ref 0–41)
BASOPHILS # BLD AUTO: 0 K/UL — SIGNIFICANT CHANGE UP (ref 0–0.2)
BASOPHILS NFR BLD AUTO: 0 % — SIGNIFICANT CHANGE UP (ref 0–1)
BILIRUB SERPL-MCNC: 0.2 MG/DL — SIGNIFICANT CHANGE UP (ref 0.2–1.2)
BUN SERPL-MCNC: 21 MG/DL — HIGH (ref 10–20)
CALCIUM SERPL-MCNC: 9.7 MG/DL — SIGNIFICANT CHANGE UP (ref 8.5–10.1)
CHLORIDE SERPL-SCNC: 95 MMOL/L — LOW (ref 98–110)
CO2 SERPL-SCNC: 25 MMOL/L — SIGNIFICANT CHANGE UP (ref 17–32)
CREAT SERPL-MCNC: 1.2 MG/DL — SIGNIFICANT CHANGE UP (ref 0.7–1.5)
EOSINOPHIL # BLD AUTO: 0.12 K/UL — SIGNIFICANT CHANGE UP (ref 0–0.7)
EOSINOPHIL NFR BLD AUTO: 4.4 % — SIGNIFICANT CHANGE UP (ref 0–8)
GIANT PLATELETS BLD QL SMEAR: PRESENT — SIGNIFICANT CHANGE UP
GLUCOSE SERPL-MCNC: 92 MG/DL — SIGNIFICANT CHANGE UP (ref 70–99)
HCT VFR BLD CALC: 40 % — LOW (ref 42–52)
HGB BLD-MCNC: 12.5 G/DL — LOW (ref 14–18)
LACTATE SERPL-SCNC: 0.5 MMOL/L — SIGNIFICANT CHANGE UP (ref 0.5–2.2)
LYMPHOCYTES # BLD AUTO: 1.15 K/UL — LOW (ref 1.2–3.4)
LYMPHOCYTES # BLD AUTO: 42.1 % — SIGNIFICANT CHANGE UP (ref 20.5–51.1)
MAGNESIUM SERPL-MCNC: 2 MG/DL — SIGNIFICANT CHANGE UP (ref 1.8–2.4)
MANUAL SMEAR VERIFICATION: SIGNIFICANT CHANGE UP
MCHC RBC-ENTMCNC: 24.9 PG — LOW (ref 27–31)
MCHC RBC-ENTMCNC: 31.3 G/DL — LOW (ref 32–37)
MCV RBC AUTO: 79.7 FL — LOW (ref 80–94)
MONOCYTES # BLD AUTO: 0.7 K/UL — HIGH (ref 0.1–0.6)
MONOCYTES NFR BLD AUTO: 25.5 % — HIGH (ref 1.7–9.3)
MYELOCYTES NFR BLD: 2.6 % — HIGH (ref 0–0)
NEUTROPHILS # BLD AUTO: 0.29 K/UL — LOW (ref 1.4–6.5)
NEUTROPHILS NFR BLD AUTO: 7.9 % — LOW (ref 42.2–75.2)
NEUTS BAND # BLD: 2.6 % — SIGNIFICANT CHANGE UP (ref 0–6)
NRBC # BLD: 0 /100 WBCS — SIGNIFICANT CHANGE UP (ref 0–0)
PLAT MORPH BLD: NORMAL — SIGNIFICANT CHANGE UP
PLATELET # BLD AUTO: 446 K/UL — HIGH (ref 130–400)
POTASSIUM SERPL-MCNC: 4.3 MMOL/L — SIGNIFICANT CHANGE UP (ref 3.5–5)
POTASSIUM SERPL-SCNC: 4.3 MMOL/L — SIGNIFICANT CHANGE UP (ref 3.5–5)
PROT SERPL-MCNC: 8.1 G/DL — HIGH (ref 6–8)
RBC # BLD: 5.02 M/UL — SIGNIFICANT CHANGE UP (ref 4.7–6.1)
RBC # FLD: 15.4 % — HIGH (ref 11.5–14.5)
RBC BLD AUTO: NORMAL — SIGNIFICANT CHANGE UP
SODIUM SERPL-SCNC: 138 MMOL/L — SIGNIFICANT CHANGE UP (ref 135–146)
VARIANT LYMPHS # BLD: 14.9 % — HIGH (ref 0–5)
WBC # BLD: 2.73 K/UL — LOW (ref 4.8–10.8)
WBC # FLD AUTO: 2.73 K/UL — LOW (ref 4.8–10.8)

## 2019-01-28 NOTE — ED ADULT TRIAGE NOTE - CHIEF COMPLAINT QUOTE
patient states "my immune system is 0". states he has sores on his tongue, fevers, sweats. 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 states he last used cocaine a couple days ago. denies chest pain/sob

## 2019-01-28 NOTE — ED PROVIDER NOTE - PHYSICAL EXAMINATION
VITAL SIGNS: I have reviewed nursing notes and confirm.  CONSTITUTIONAL: Well-developed; well-nourished; in no acute distress.  SKIN: Skin exam is warm and dry, no acute rash.  HEAD: Normocephalic; atraumatic.  EYES: Conjunctiva and sclera clear.  ENT: No nasal discharge; airway clear. (+) small white ulcers to buccal mucosa and under tongue. Non-tender.   NECK: Supple; non tender.  CARD: S1, S2 normal; no murmurs, gallops, or rubs. Regular rate and rhythm.  RESP: No wheezes, rales or rhonchi. Speaking in full sentences.   ABD: Normal bowel sounds; soft; non-distended; non-tender; No rebound or guarding.   EXT: Normal ROM. No clubbing, cyanosis or edema.  NEURO: Alert, oriented. Grossly unremarkable. No focal deficits.

## 2019-01-28 NOTE — ED PROVIDER NOTE - CARE PROVIDER_API CALL
Araceli Neal), HematologyOncology; Internal Medicine; Medical Oncology  [address] [place] [id number]  Phone: [phone]  Fax: [phone]    Haylee Gutierrez), Hematology; Internal Medicine; Medical Oncology  [address] [place] [address]  Phone: [phone]  Fax: [phone]

## 2019-01-28 NOTE — ED PROVIDER NOTE - ATTENDING CONTRIBUTION TO CARE
was present for and supervised the key and critical aspects of the procedures performed during the care of the patient. Patient presents for evaluation of wbc count, he has a history of cocaine use laced with levimasole, last use 4 days prior he denies any chest pain, he denies any shortness of breath, he denies any fevers or chills he denies any palpitations.  On physical exam the patient is nc/at perrla eomi oropharynx clear cta b/l, rrr s1s2 noted abd-soft nt ndbs+ ext from. Maureent has painful vesucular ulcerations in the oropharynx.  no other evidence of abnormalities ekg is non-ischemic.  we consulted hem/onc for further management at this time.

## 2019-01-28 NOTE — ED PROVIDER NOTE - MEDICAL DECISION MAKING DETAILS
I was present for and supervised the key and critical aspects of the procedures performed during the care of the patient. Patient presents for evaluation of wbc count, he has a history of cocaine use laced with levimasole, last use 4 days prior he denies any chest pain, he denies any shortness of breath, he denies any fevers or chills he denies any palpitations.  On physical exam the patient is nc/at perrla eomi oropharynx clear cta b/l, rrr s1s2 noted abd-soft nt ndbs+ ext from.  we obtained cbc and consulted hem patient will be discharged with follow up in am

## 2019-01-28 NOTE — ED ADULT NURSE NOTE - CHPI ED NUR SYMPTOMS NEG
no nausea/no vomiting/no tingling/no dizziness/no pain/no weakness/no decreased eating/drinking/no chills

## 2019-01-28 NOTE — ED PROVIDER NOTE - PROGRESS NOTE DETAILS
Discussed case with hem/onc fellow, recommends pt call [facility] tomorrow for appointment to receive Neupogen. Pt understands need to follow-up. Strict return precautions given. Agreeable to d/c.

## 2019-01-28 NOTE — ED ADULT NURSE NOTE - OBJECTIVE STATEMENT
Pt presents to ER with complaints of subjective fever and sores under tongue, pt states he used "600 of cocaine" and pt states pts "immune system is always zero after cocaine use" Pt denies any chest pain/sob.

## 2019-01-28 NOTE — ED PROVIDER NOTE - CARE PROVIDERS DIRECT ADDRESSES
[email or web address]

## 2019-01-28 NOTE — ED ADULT NURSE NOTE - CINV DISCH TEACH PARTICIP
educated pt to return to er with fever and educated pt to follow up with outpatient with teach back/Patient

## 2019-01-29 ENCOUNTER — INBOUND DOCUMENT (OUTPATIENT)
Age: 49
End: 2019-01-29

## 2019-01-30 ENCOUNTER — APPOINTMENT (OUTPATIENT)
Dept: HEMATOLOGY ONCOLOGY | Facility: CLINIC | Age: 49
End: 2019-01-30

## 2019-01-30 ENCOUNTER — APPOINTMENT (OUTPATIENT)
Dept: INFUSION THERAPY | Facility: CLINIC | Age: 49
End: 2019-01-30

## 2019-01-30 VITALS
WEIGHT: 222 LBS | RESPIRATION RATE: 14 BRPM | DIASTOLIC BLOOD PRESSURE: 78 MMHG | TEMPERATURE: 97.9 F | SYSTOLIC BLOOD PRESSURE: 116 MMHG | HEART RATE: 78 BPM

## 2019-01-30 VITALS — BODY MASS INDEX: 27.75 KG/M2 | HEIGHT: 75 IN

## 2019-01-30 DIAGNOSIS — F14.10 COCAINE ABUSE, UNCOMPLICATED: ICD-10-CM

## 2019-01-30 DIAGNOSIS — D70.9 NEUTROPENIA, UNSPECIFIED: ICD-10-CM

## 2019-01-30 RX ORDER — FILGRASTIM 480MCG/1.6
480 VIAL (ML) INJECTION ONCE
Qty: 0 | Refills: 0 | Status: COMPLETED | OUTPATIENT
Start: 2019-01-30 | End: 2019-01-30

## 2019-01-30 RX ADMIN — Medication 480 MICROGRAM(S): at 16:30

## 2019-01-30 NOTE — ASSESSMENT
[FreeTextEntry1] : \par \par Patient well known to heme onc service for recurrent visit to ED for neutropenia\par after using cocaine , refused bone marrow biopsy and further studies.\par \par \par We advised the patient to stop using cocaine. He wants to stop using cocaine,\par but at this point he has no interest to stop and wants to address his\par cellulitis issue first.\par \par neupogen 480mcg x2\par bone marrow biopsy in 2 weeks\par \par \par
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
## 2019-01-30 NOTE — REVIEW OF SYSTEMS
[Fever] : no fever [Chills] : no chills [Night Sweats] : no night sweats [Fatigue] : no fatigue [Recent Change In Weight] : ~T no recent weight change [Eye Pain] : no eye pain [Negative] : Allergic/Immunologic

## 2019-01-30 NOTE — PHYSICAL EXAM
[Restricted in physically strenuous activity but ambulatory and able to carry out work of a light or sedentary nature] : Status 1- Restricted in physically strenuous activity but ambulatory and able to carry out work of a light or sedentary nature, e.g., light house work, office work [Normal] : affect appropriate [de-identified] : aphthous ulcers

## 2019-01-31 ENCOUNTER — APPOINTMENT (OUTPATIENT)
Dept: INFUSION THERAPY | Facility: CLINIC | Age: 49
End: 2019-01-31

## 2019-01-31 ENCOUNTER — OUTPATIENT (OUTPATIENT)
Dept: OUTPATIENT SERVICES | Facility: HOSPITAL | Age: 49
LOS: 1 days | Discharge: HOME | End: 2019-01-31

## 2019-01-31 RX ORDER — FILGRASTIM 480MCG/1.6
480 VIAL (ML) INJECTION ONCE
Qty: 0 | Refills: 0 | Status: COMPLETED | OUTPATIENT
Start: 2019-01-31 | End: 2019-01-31

## 2019-01-31 RX ORDER — FILGRASTIM 480MCG/1.6
480 VIAL (ML) INJECTION ONCE
Qty: 0 | Refills: 0 | Status: DISCONTINUED | OUTPATIENT
Start: 2019-01-31 | End: 2019-01-31

## 2019-01-31 RX ADMIN — Medication 480 MICROGRAM(S): at 15:58

## 2019-02-01 DIAGNOSIS — D70.9 NEUTROPENIA, UNSPECIFIED: ICD-10-CM

## 2019-02-07 DIAGNOSIS — D70.2 OTHER DRUG-INDUCED AGRANULOCYTOSIS: ICD-10-CM

## 2019-02-13 ENCOUNTER — APPOINTMENT (OUTPATIENT)
Dept: HEMATOLOGY ONCOLOGY | Facility: CLINIC | Age: 49
End: 2019-02-13

## 2019-02-14 NOTE — PROGRESS NOTE ADULT - MINUTES
Patient returned call.     Subjective measures:  Patient failing following subjective benchmarks: recent fall and he broke his shoulder.  He is trying to use nightly.  He has increased his humidity and this has been helpful.     Action plan:pt to have 30 day [facility] visit.     30

## 2019-04-29 ENCOUNTER — EMERGENCY (EMERGENCY)
Facility: HOSPITAL | Age: 49
LOS: 0 days | Discharge: LEFT BEFORE DISCHARGE | End: 2019-04-29
Attending: EMERGENCY MEDICINE | Admitting: EMERGENCY MEDICINE
Payer: COMMERCIAL

## 2019-04-29 VITALS
TEMPERATURE: 97 F | OXYGEN SATURATION: 98 % | SYSTOLIC BLOOD PRESSURE: 130 MMHG | RESPIRATION RATE: 18 BRPM | DIASTOLIC BLOOD PRESSURE: 74 MMHG | HEART RATE: 56 BPM

## 2019-04-29 VITALS
RESPIRATION RATE: 18 BRPM | SYSTOLIC BLOOD PRESSURE: 129 MMHG | OXYGEN SATURATION: 99 % | TEMPERATURE: 98 F | HEART RATE: 81 BPM | DIASTOLIC BLOOD PRESSURE: 77 MMHG

## 2019-04-29 DIAGNOSIS — R07.89 OTHER CHEST PAIN: ICD-10-CM

## 2019-04-29 DIAGNOSIS — Z79.2 LONG TERM (CURRENT) USE OF ANTIBIOTICS: ICD-10-CM

## 2019-04-29 DIAGNOSIS — F17.200 NICOTINE DEPENDENCE, UNSPECIFIED, UNCOMPLICATED: ICD-10-CM

## 2019-04-29 LAB
ALBUMIN SERPL ELPH-MCNC: 3.8 G/DL — SIGNIFICANT CHANGE UP (ref 3.5–5.2)
ALP SERPL-CCNC: 82 U/L — SIGNIFICANT CHANGE UP (ref 30–115)
ALT FLD-CCNC: 16 U/L — SIGNIFICANT CHANGE UP (ref 0–41)
ANION GAP SERPL CALC-SCNC: 14 MMOL/L — SIGNIFICANT CHANGE UP (ref 7–14)
AST SERPL-CCNC: 20 U/L — SIGNIFICANT CHANGE UP (ref 0–41)
BASOPHILS # BLD AUTO: 0.05 K/UL — SIGNIFICANT CHANGE UP (ref 0–0.2)
BASOPHILS NFR BLD AUTO: 1.1 % — HIGH (ref 0–1)
BILIRUB SERPL-MCNC: <0.2 MG/DL — SIGNIFICANT CHANGE UP (ref 0.2–1.2)
BUN SERPL-MCNC: 20 MG/DL — SIGNIFICANT CHANGE UP (ref 10–20)
CALCIUM SERPL-MCNC: 8.7 MG/DL — SIGNIFICANT CHANGE UP (ref 8.5–10.1)
CHLORIDE SERPL-SCNC: 99 MMOL/L — SIGNIFICANT CHANGE UP (ref 98–110)
CO2 SERPL-SCNC: 24 MMOL/L — SIGNIFICANT CHANGE UP (ref 17–32)
CREAT SERPL-MCNC: 1.1 MG/DL — SIGNIFICANT CHANGE UP (ref 0.7–1.5)
EOSINOPHIL # BLD AUTO: 0.18 K/UL — SIGNIFICANT CHANGE UP (ref 0–0.7)
EOSINOPHIL NFR BLD AUTO: 3.9 % — SIGNIFICANT CHANGE UP (ref 0–8)
GLUCOSE SERPL-MCNC: 83 MG/DL — SIGNIFICANT CHANGE UP (ref 70–99)
HCT VFR BLD CALC: 37.8 % — LOW (ref 42–52)
HGB BLD-MCNC: 12.1 G/DL — LOW (ref 14–18)
IMM GRANULOCYTES NFR BLD AUTO: 0.2 % — SIGNIFICANT CHANGE UP (ref 0.1–0.3)
LYMPHOCYTES # BLD AUTO: 1.15 K/UL — LOW (ref 1.2–3.4)
LYMPHOCYTES # BLD AUTO: 24.6 % — SIGNIFICANT CHANGE UP (ref 20.5–51.1)
MCHC RBC-ENTMCNC: 26.1 PG — LOW (ref 27–31)
MCHC RBC-ENTMCNC: 32 G/DL — SIGNIFICANT CHANGE UP (ref 32–37)
MCV RBC AUTO: 81.5 FL — SIGNIFICANT CHANGE UP (ref 80–94)
MONOCYTES # BLD AUTO: 0.46 K/UL — SIGNIFICANT CHANGE UP (ref 0.1–0.6)
MONOCYTES NFR BLD AUTO: 9.9 % — HIGH (ref 1.7–9.3)
NEUTROPHILS # BLD AUTO: 2.82 K/UL — SIGNIFICANT CHANGE UP (ref 1.4–6.5)
NEUTROPHILS NFR BLD AUTO: 60.3 % — SIGNIFICANT CHANGE UP (ref 42.2–75.2)
NRBC # BLD: 0 /100 WBCS — SIGNIFICANT CHANGE UP (ref 0–0)
PLATELET # BLD AUTO: 225 K/UL — SIGNIFICANT CHANGE UP (ref 130–400)
POTASSIUM SERPL-MCNC: 4.3 MMOL/L — SIGNIFICANT CHANGE UP (ref 3.5–5)
POTASSIUM SERPL-SCNC: 4.3 MMOL/L — SIGNIFICANT CHANGE UP (ref 3.5–5)
PROT SERPL-MCNC: 7.1 G/DL — SIGNIFICANT CHANGE UP (ref 6–8)
RBC # BLD: 4.64 M/UL — LOW (ref 4.7–6.1)
RBC # FLD: 17.2 % — HIGH (ref 11.5–14.5)
SODIUM SERPL-SCNC: 137 MMOL/L — SIGNIFICANT CHANGE UP (ref 135–146)
TROPONIN T SERPL-MCNC: <0.01 NG/ML — SIGNIFICANT CHANGE UP
WBC # BLD: 4.67 K/UL — LOW (ref 4.8–10.8)
WBC # FLD AUTO: 4.67 K/UL — LOW (ref 4.8–10.8)

## 2019-04-29 PROCEDURE — 99285 EMERGENCY DEPT VISIT HI MDM: CPT

## 2019-04-29 PROCEDURE — 71046 X-RAY EXAM CHEST 2 VIEWS: CPT | Mod: 26

## 2019-04-29 NOTE — ED PROVIDER NOTE - NS ED ROS FT
Constitutional: + chills. no fever, no recent weight loss, change in appetite or malaise  Cardiac: + mid sternal chest heaviness. No SOB   Respiratory: + productive cough. No respiratory distress  GI: No nausea, vomiting, diarrhea or abdominal pain.  : No dysuria, frequency, urgency or hematuria  MS: no pain to back or extremities, no loss of ROM, no weakness  Extrem: + b/l leg swelling (chronic)  Neuro: No headache or weakness. No LOC.  Skin: No skin rash.  Except as documented in the HPI, all other systems are negative.

## 2019-04-29 NOTE — ED PROVIDER NOTE - ATTENDING CONTRIBUTION TO CARE
49M PMH substance abuse/etoh , last used months ago, p/w CP. R chest heaviness x 2 days, constant nonradiating. nothing makes it better or worse. assoc 2 days of wheezing at night, sub fever, dry cough. no trauma. no tearing bp. no le edema, le pain, immobilization, hormones, hemoptysis. no prior cardiac work up. exsmoker (states quit 1 week ago). father had stents in 60s.     on exam, AFVSS, well maribeth nad, ncat, eomi, perrla, mmm, lctab, rrr nl s1s2 no mrg, abd soft ntnd, aaox3, no focal deficits, no le edema or calf ttp,     a/p; CP, cough ?wheeze - none on exam, sub fever, afebrile in ED, r/o ACS, r/o PNA, will do labs, ekg/trop, CXR. PERC neg, H&P not consistent w pe, dissection, ptx, esoph perf, tamponade.

## 2019-04-29 NOTE — ED PROVIDER NOTE - CLINICAL SUMMARY MEDICAL DECISION MAKING FREE TEXT BOX
ekg/trop neg, CXR clear. pt refusing EDOU or to stay for serial enzyme/ekg, wants work note and to follow up w cardiology as outpt, has capacity to refuse. no si, hi, hallucination. no intoxication. will have to sign out ama. understands can come back anytime if changes mind.     Patient is awake/alert/interactive with normal mental status and normal neurologic function. Patient reports no SI/HI and demonstrates normal thought processes with no evidence of intoxication, delirium, delusions or hallucinations. Patient requesting to leave against medical advice at this time. Advised patient of potential risks of leaving AMA which include potential disability or death. Attempted to convince patient to stay and continue work up/treatment and patient refused. Patient has capacity to make medical decisions at this time and will be signed out AMA. Patient instructed to follow up with his primary care provider as an outpatient and is aware they can return to the emergency department at any time for evaluation.

## 2019-04-29 NOTE — ED PROVIDER NOTE - CARE PROVIDER_API CALL
David Ashby)  Cardiovascular Disease  [address] [place] [address]  Phone: [phone]  Fax: [phone]  Follow Up Time:

## 2019-04-29 NOTE — ED PROVIDER NOTE - OBJECTIVE STATEMENT
49 year old M with hx of Etoh abuse/cocaine use c/o 1 day of mid sternal chest pain. The pain is heavy and constant and not affected by exertion/inspiration/movement. + cough x 1 day. + chills, night sweats. Pt sts he has not drank/used cocaine in the last 4 months. + smoking hx. Pt has never seen a cardiologist. He denies any fever, sob, recent travel, sick contacts, abdominal pain, n/v/d.

## 2019-04-29 NOTE — ED PROVIDER NOTE - PHYSICAL EXAMINATION
CONSTITUTIONAL: Well-appearing; well-nourished; in no apparent distress.   NECK: Supple; non-tender; no cervical lymphadenopathy.   CARDIOVASCULAR: Normal S1, S2; no murmurs, rubs, or gallops.   Chest: No chest all tenderness  RESPIRATORY: Normal chest excursion with respiration; breath sounds clear and equal bilaterally; no wheezes, rhonchi, or rales.  GI/: Normal bowel sounds; non-distended; non-tender; no palpable organomegaly.   MS: No evidence of trauma or deformity.  Normal ROM in all four extremities; non-tender to palpation; distal pulses are normal.  Extrem: + b/l mild peripheral edema (chronic)  SKIN: Normal for age and race; warm; dry; good turgor; no apparent lesions or exudate.   NEURO/PSYCH: A & O x 4; grossly unremarkable. mood and manner are appropriate.

## 2019-05-20 NOTE — ED PROVIDER NOTE - OBJECTIVE STATEMENT
[age] yo M with PMHx of alcohol abuse, cocaine abuse, and neutropenia 2/2 levamisole laced cocaine. Pt states he developed ulcers in his mouth a few days ago and states this happens when his cell counts are low. Pt denies other complaints. Last used cocaine 4 days ago. Pt denies fever, chills, nausea, vomiting, abdominal pain, diarrhea, headache, dizziness, weakness, chest pain, SOB, back pain, LOC, trauma, urinary symptoms, cough, calf pain/swelling, recent travel, recent surgery.

## 2019-08-12 ENCOUNTER — OUTPATIENT (OUTPATIENT)
Dept: OUTPATIENT SERVICES | Facility: HOSPITAL | Age: 49
LOS: 1 days | Discharge: HOME | End: 2019-08-12
Payer: COMMERCIAL

## 2019-08-12 VITALS
HEART RATE: 56 BPM | HEIGHT: 75 IN | SYSTOLIC BLOOD PRESSURE: 112 MMHG | TEMPERATURE: 96 F | OXYGEN SATURATION: 97 % | RESPIRATION RATE: 18 BRPM | WEIGHT: 220.02 LBS | DIASTOLIC BLOOD PRESSURE: 79 MMHG

## 2019-08-12 DIAGNOSIS — M20.11 HALLUX VALGUS (ACQUIRED), RIGHT FOOT: ICD-10-CM

## 2019-08-12 DIAGNOSIS — Z01.818 ENCOUNTER FOR OTHER PREPROCEDURAL EXAMINATION: ICD-10-CM

## 2019-08-12 DIAGNOSIS — M20.41 OTHER HAMMER TOE(S) (ACQUIRED), RIGHT FOOT: ICD-10-CM

## 2019-08-12 LAB
ALBUMIN SERPL ELPH-MCNC: 4.2 G/DL — SIGNIFICANT CHANGE UP (ref 3.5–5.2)
ALP SERPL-CCNC: 87 U/L — SIGNIFICANT CHANGE UP (ref 30–115)
ALT FLD-CCNC: 14 U/L — SIGNIFICANT CHANGE UP (ref 0–41)
ANION GAP SERPL CALC-SCNC: 11 MMOL/L — SIGNIFICANT CHANGE UP (ref 7–14)
APPEARANCE UR: CLEAR — SIGNIFICANT CHANGE UP
APTT BLD: 45.4 SEC — HIGH (ref 27–39.2)
AST SERPL-CCNC: 21 U/L — SIGNIFICANT CHANGE UP (ref 0–41)
BACTERIA # UR AUTO: 0 — SIGNIFICANT CHANGE UP
BASOPHILS # BLD AUTO: 0.07 K/UL — SIGNIFICANT CHANGE UP (ref 0–0.2)
BASOPHILS NFR BLD AUTO: 1.3 % — HIGH (ref 0–1)
BILIRUB SERPL-MCNC: 0.3 MG/DL — SIGNIFICANT CHANGE UP (ref 0.2–1.2)
BILIRUB UR-MCNC: NEGATIVE — SIGNIFICANT CHANGE UP
BUN SERPL-MCNC: 15 MG/DL — SIGNIFICANT CHANGE UP (ref 10–20)
CALCIUM SERPL-MCNC: 9.2 MG/DL — SIGNIFICANT CHANGE UP (ref 8.5–10.1)
CHLORIDE SERPL-SCNC: 103 MMOL/L — SIGNIFICANT CHANGE UP (ref 98–110)
CO2 SERPL-SCNC: 26 MMOL/L — SIGNIFICANT CHANGE UP (ref 17–32)
COLOR SPEC: YELLOW — SIGNIFICANT CHANGE UP
CREAT SERPL-MCNC: 1 MG/DL — SIGNIFICANT CHANGE UP (ref 0.7–1.5)
DIFF PNL FLD: SIGNIFICANT CHANGE UP
EOSINOPHIL # BLD AUTO: 0.19 K/UL — SIGNIFICANT CHANGE UP (ref 0–0.7)
EOSINOPHIL NFR BLD AUTO: 3.5 % — SIGNIFICANT CHANGE UP (ref 0–8)
EPI CELLS # UR: 0 /HPF — SIGNIFICANT CHANGE UP (ref 0–5)
GLUCOSE SERPL-MCNC: 72 MG/DL — SIGNIFICANT CHANGE UP (ref 70–99)
GLUCOSE UR QL: NEGATIVE — SIGNIFICANT CHANGE UP
HCT VFR BLD CALC: 43.8 % — SIGNIFICANT CHANGE UP (ref 42–52)
HGB BLD-MCNC: 13.3 G/DL — LOW (ref 14–18)
HYALINE CASTS # UR AUTO: 0 /LPF — SIGNIFICANT CHANGE UP (ref 0–7)
IMM GRANULOCYTES NFR BLD AUTO: 0.5 % — HIGH (ref 0.1–0.3)
INR BLD: 0.99 RATIO — SIGNIFICANT CHANGE UP (ref 0.65–1.3)
KETONES UR-MCNC: SIGNIFICANT CHANGE UP
LEUKOCYTE ESTERASE UR-ACNC: NEGATIVE — SIGNIFICANT CHANGE UP
LYMPHOCYTES # BLD AUTO: 1.25 K/UL — SIGNIFICANT CHANGE UP (ref 1.2–3.4)
LYMPHOCYTES # BLD AUTO: 22.7 % — SIGNIFICANT CHANGE UP (ref 20.5–51.1)
MCHC RBC-ENTMCNC: 25.6 PG — LOW (ref 27–31)
MCHC RBC-ENTMCNC: 30.4 G/DL — LOW (ref 32–37)
MCV RBC AUTO: 84.2 FL — SIGNIFICANT CHANGE UP (ref 80–94)
MONOCYTES # BLD AUTO: 0.49 K/UL — SIGNIFICANT CHANGE UP (ref 0.1–0.6)
MONOCYTES NFR BLD AUTO: 8.9 % — SIGNIFICANT CHANGE UP (ref 1.7–9.3)
NEUTROPHILS # BLD AUTO: 3.47 K/UL — SIGNIFICANT CHANGE UP (ref 1.4–6.5)
NEUTROPHILS NFR BLD AUTO: 63.1 % — SIGNIFICANT CHANGE UP (ref 42.2–75.2)
NITRITE UR-MCNC: NEGATIVE — SIGNIFICANT CHANGE UP
NRBC # BLD: 0 /100 WBCS — SIGNIFICANT CHANGE UP (ref 0–0)
PH UR: 6 — SIGNIFICANT CHANGE UP (ref 5–8)
PLATELET # BLD AUTO: 393 K/UL — SIGNIFICANT CHANGE UP (ref 130–400)
POTASSIUM SERPL-MCNC: 5.3 MMOL/L — HIGH (ref 3.5–5)
POTASSIUM SERPL-SCNC: 5.3 MMOL/L — HIGH (ref 3.5–5)
PROT SERPL-MCNC: 7.9 G/DL — SIGNIFICANT CHANGE UP (ref 6–8)
PROT UR-MCNC: ABNORMAL
PROTHROM AB SERPL-ACNC: 11.4 SEC — SIGNIFICANT CHANGE UP (ref 9.95–12.87)
RBC # BLD: 5.2 M/UL — SIGNIFICANT CHANGE UP (ref 4.7–6.1)
RBC # FLD: 17.9 % — HIGH (ref 11.5–14.5)
RBC CASTS # UR COMP ASSIST: 1 /HPF — SIGNIFICANT CHANGE UP (ref 0–4)
SODIUM SERPL-SCNC: 140 MMOL/L — SIGNIFICANT CHANGE UP (ref 135–146)
SP GR SPEC: 1.03 — HIGH (ref 1.01–1.02)
UROBILINOGEN FLD QL: SIGNIFICANT CHANGE UP
WBC # BLD: 5.5 K/UL — SIGNIFICANT CHANGE UP (ref 4.8–10.8)
WBC # FLD AUTO: 5.5 K/UL — SIGNIFICANT CHANGE UP (ref 4.8–10.8)
WBC UR QL: 0 /HPF — SIGNIFICANT CHANGE UP (ref 0–5)

## 2019-08-12 PROCEDURE — 93010 ELECTROCARDIOGRAM REPORT: CPT

## 2019-08-12 NOTE — H&P PST ADULT - NSICDXPASTMEDICALHX_GEN_ALL_CORE_FT
PAST MEDICAL HISTORY:  Alcoholism     History of bunionectomy     Illicit drug use     Neutropenia PAST MEDICAL HISTORY:  Alcoholism clean 1 yr    History of bunionectomy     Illicit drug use pt denies    Neutropenia pt denies

## 2019-08-15 NOTE — ASU PATIENT PROFILE, ADULT - PMH
Alcoholism  clean 1 yr  History of bunionectomy    Illicit drug use  pt denies  Neutropenia  pt denies

## 2019-08-16 ENCOUNTER — OUTPATIENT (OUTPATIENT)
Dept: OUTPATIENT SERVICES | Facility: HOSPITAL | Age: 49
LOS: 1 days | Discharge: HOME | End: 2019-08-16
Payer: COMMERCIAL

## 2019-08-16 ENCOUNTER — RESULT REVIEW (OUTPATIENT)
Age: 49
End: 2019-08-16

## 2019-08-16 VITALS
OXYGEN SATURATION: 98 % | SYSTOLIC BLOOD PRESSURE: 126 MMHG | HEIGHT: 75 IN | WEIGHT: 220.02 LBS | HEART RATE: 72 BPM | TEMPERATURE: 98 F | DIASTOLIC BLOOD PRESSURE: 64 MMHG | RESPIRATION RATE: 18 BRPM

## 2019-08-16 VITALS
OXYGEN SATURATION: 98 % | SYSTOLIC BLOOD PRESSURE: 119 MMHG | RESPIRATION RATE: 20 BRPM | DIASTOLIC BLOOD PRESSURE: 79 MMHG | HEART RATE: 66 BPM

## 2019-08-16 DIAGNOSIS — M20.41 OTHER HAMMER TOE(S) (ACQUIRED), RIGHT FOOT: ICD-10-CM

## 2019-08-16 DIAGNOSIS — M20.11 HALLUX VALGUS (ACQUIRED), RIGHT FOOT: ICD-10-CM

## 2019-08-16 PROCEDURE — 88311 DECALCIFY TISSUE: CPT | Mod: 26

## 2019-08-16 PROCEDURE — 88341 IMHCHEM/IMCYTCHM EA ADD ANTB: CPT | Mod: 26

## 2019-08-16 PROCEDURE — 88342 IMHCHEM/IMCYTCHM 1ST ANTB: CPT | Mod: 26

## 2019-08-16 PROCEDURE — 88304 TISSUE EXAM BY PATHOLOGIST: CPT | Mod: 26

## 2019-08-16 RX ORDER — ONDANSETRON 8 MG/1
4 TABLET, FILM COATED ORAL ONCE
Refills: 0 | Status: DISCONTINUED | OUTPATIENT
Start: 2019-08-16 | End: 2019-08-31

## 2019-08-16 RX ORDER — SODIUM CHLORIDE 9 MG/ML
1000 INJECTION, SOLUTION INTRAVENOUS
Refills: 0 | Status: DISCONTINUED | OUTPATIENT
Start: 2019-08-16 | End: 2019-08-31

## 2019-08-16 RX ORDER — HYDROMORPHONE HYDROCHLORIDE 2 MG/ML
1 INJECTION INTRAMUSCULAR; INTRAVENOUS; SUBCUTANEOUS
Refills: 0 | Status: DISCONTINUED | OUTPATIENT
Start: 2019-08-16 | End: 2019-08-16

## 2019-08-16 RX ORDER — OXYCODONE AND ACETAMINOPHEN 5; 325 MG/1; MG/1
2 TABLET ORAL ONCE
Refills: 0 | Status: DISCONTINUED | OUTPATIENT
Start: 2019-08-16 | End: 2019-08-16

## 2019-08-16 RX ORDER — HYDROMORPHONE HYDROCHLORIDE 2 MG/ML
0.5 INJECTION INTRAMUSCULAR; INTRAVENOUS; SUBCUTANEOUS
Refills: 0 | Status: DISCONTINUED | OUTPATIENT
Start: 2019-08-16 | End: 2019-08-16

## 2019-08-16 RX ADMIN — SODIUM CHLORIDE 100 MILLILITER(S): 9 INJECTION, SOLUTION INTRAVENOUS at 20:08

## 2019-08-16 NOTE — BRIEF OPERATIVE NOTE - ASSISTANT(S)
Wilfrido Arango PGY3 Brendan Richter PGY2 Latha Guerin PGY1 Wilfrido Arango PGY3 Brendan Richter PGY2 Rebecca Guerin PGY1

## 2019-08-16 NOTE — ASU DISCHARGE PLAN (ADULT/PEDIATRIC) - CALL YOUR DOCTOR IF YOU HAVE ANY OF THE FOLLOWING:
Increased irritability or sluggishness/Numbness, tingling, color or temperature change to extremity/Bleeding that does not stop

## 2019-08-16 NOTE — BRIEF OPERATIVE NOTE - NSICDXBRIEFPROCEDURE_GEN_ALL_CORE_FT
PROCEDURES:  Arthroplasty, toe 16-Aug-2019 19:56:03  Wilfrido Arango  Bunionectomy 16-Aug-2019 19:55:42  Wilfrido Arango

## 2019-08-16 NOTE — BRIEF OPERATIVE NOTE - NSICDXBRIEFPREOP_GEN_ALL_CORE_FT
PRE-OP DIAGNOSIS:  Hammertoe of right foot 16-Aug-2019 19:56:52  Wilfrido Arango  Hallux abductovalgus, right 16-Aug-2019 19:56:15  Wilfrido Arango

## 2019-08-30 LAB — SURGICAL PATHOLOGY STUDY: SIGNIFICANT CHANGE UP

## 2019-09-09 PROBLEM — F10.20 ALCOHOL DEPENDENCE, UNCOMPLICATED: Chronic | Status: ACTIVE | Noted: 2018-05-05

## 2019-09-09 PROBLEM — Z98.890 OTHER SPECIFIED POSTPROCEDURAL STATES: Chronic | Status: ACTIVE | Noted: 2019-08-12

## 2019-09-09 PROBLEM — F19.90 OTHER PSYCHOACTIVE SUBSTANCE USE, UNSPECIFIED, UNCOMPLICATED: Chronic | Status: ACTIVE | Noted: 2018-09-01

## 2019-09-09 PROBLEM — D70.9 NEUTROPENIA, UNSPECIFIED: Chronic | Status: ACTIVE | Noted: 2018-09-25

## 2019-09-10 ENCOUNTER — OUTPATIENT (OUTPATIENT)
Dept: OUTPATIENT SERVICES | Facility: HOSPITAL | Age: 49
LOS: 1 days | Discharge: HOME | End: 2019-09-10
Payer: COMMERCIAL

## 2019-09-10 ENCOUNTER — RESULT REVIEW (OUTPATIENT)
Age: 49
End: 2019-09-10

## 2019-09-10 VITALS
DIASTOLIC BLOOD PRESSURE: 83 MMHG | HEART RATE: 64 BPM | RESPIRATION RATE: 17 BRPM | HEIGHT: 75 IN | OXYGEN SATURATION: 99 % | SYSTOLIC BLOOD PRESSURE: 147 MMHG | TEMPERATURE: 97 F | WEIGHT: 225.09 LBS

## 2019-09-10 VITALS — DIASTOLIC BLOOD PRESSURE: 77 MMHG | HEART RATE: 57 BPM | SYSTOLIC BLOOD PRESSURE: 109 MMHG | RESPIRATION RATE: 16 BRPM

## 2019-09-10 PROCEDURE — 88304 TISSUE EXAM BY PATHOLOGIST: CPT | Mod: 26

## 2019-09-10 PROCEDURE — 88311 DECALCIFY TISSUE: CPT | Mod: 26

## 2019-09-10 RX ORDER — ONDANSETRON 8 MG/1
4 TABLET, FILM COATED ORAL ONCE
Refills: 0 | Status: DISCONTINUED | OUTPATIENT
Start: 2019-09-10 | End: 2019-10-05

## 2019-09-10 RX ORDER — OXYCODONE AND ACETAMINOPHEN 5; 325 MG/1; MG/1
1 TABLET ORAL EVERY 4 HOURS
Refills: 0 | Status: DISCONTINUED | OUTPATIENT
Start: 2019-09-10 | End: 2019-09-10

## 2019-09-10 RX ORDER — SODIUM CHLORIDE 9 MG/ML
1000 INJECTION, SOLUTION INTRAVENOUS
Refills: 0 | Status: DISCONTINUED | OUTPATIENT
Start: 2019-09-10 | End: 2019-10-05

## 2019-09-10 RX ORDER — MORPHINE SULFATE 50 MG/1
2 CAPSULE, EXTENDED RELEASE ORAL
Refills: 0 | Status: DISCONTINUED | OUTPATIENT
Start: 2019-09-10 | End: 2019-09-10

## 2019-09-10 RX ADMIN — SODIUM CHLORIDE 100 MILLILITER(S): 9 INJECTION, SOLUTION INTRAVENOUS at 10:27

## 2019-09-10 NOTE — BRIEF OPERATIVE NOTE - NSICDXBRIEFPOSTOP_GEN_ALL_CORE_FT
POST-OP DIAGNOSIS:  Hallux valgus with bunions of left foot 10-Sep-2019 10:03:37  Brendan Richter  Hammertoe of left foot 10-Sep-2019 10:03:03  Brendan Richter

## 2019-09-10 NOTE — CHART NOTE - NSCHARTNOTEFT_GEN_A_CORE
PACU ANESTHESIA ADMISSION NOTE      Procedure:   Post op diagnosis:      ____  Intubated  TV:______       Rate: ______      FiO2: ______    __x__  Patent Airway    x____  Full return of protective reflexes    ___x_  Full recovery from anesthesia / back to baseline status    Vitals:  T(C): 36 (09-10-19 @ 07:10), Max: 36 (09-10-19 @ 06:38)  HR: 64 (09-10-19 @ 07:10) (64 - 64)  BP: 147/83 (09-10-19 @ 07:10) (147/83 - 147/83)  RR: 17 (09-10-19 @ 07:10) (17 - 17)  SpO2: 99% (09-10-19 @ 07:10) (99% - 99%)    Mental Status:  _x___ Awake   __x___ Alert   _____ Drowsy   _____ Sedated    Nausea/Vomiting:  ____ NO  _x_____Yes,   See Post - Op Orders          Pain Scale (0-10):  _____    Treatment: ____ None    __x__ See Post - Op/PCA Orders    Post - Operative Fluids:   ____ Oral   __x__ See Post - Op Orders    Plan: Discharge:   _x__Home       _____Floor     _____Critical Care    _____  Other:_________________    Comments: uneventful anesthesia course no complications. VItals stable. Pt transferred to PACU

## 2019-09-10 NOTE — PRE-ANESTHESIA EVALUATION ADULT - NSANTHOSAYNRD_GEN_A_CORE
No. MAVIS screening performed.  STOP BANG Legend: 0-2 = LOW Risk; 3-4 = INTERMEDIATE Risk; 5-8 = HIGH Risk/see mavis sheet

## 2019-09-10 NOTE — ASU DISCHARGE PLAN (ADULT/PEDIATRIC) - ASU DC SPECIAL INSTRUCTIONSFT
pt to follow up with Dr. Seymour on Friday  pt to apply CAM boot left foot  pt can ambulate as tolerated with CAM boot on  Don't need to change surgical dressings

## 2019-09-10 NOTE — BRIEF OPERATIVE NOTE - COMMENTS
2%lido/0.5 Marcaine mix 20 cc  tourniquet 105mins  8:2 mix dexa/0.5 Marcaine post op  3-0 vicryl 4-0 nylon 2/3/4/5  2-0 vicryl 3-0 nylon hallux

## 2019-09-10 NOTE — BRIEF OPERATIVE NOTE - OPERATION/FINDINGS
bony prominent dorsal/medial first metatarsal head of left foot, bony promient at the PIPJ 2/3/4/5 digits

## 2019-09-10 NOTE — ASU DISCHARGE PLAN (ADULT/PEDIATRIC) - CALL YOUR DOCTOR IF YOU HAVE ANY OF THE FOLLOWING:
Unable to urinate/Inability to tolerate liquids or foods/Numbness, tingling, color or temperature change to extremity/Swelling that gets worse/Pain not relieved by Medications/Nausea and vomiting that does not stop/Bleeding that does not stop/Wound/Surgical Site with redness, or foul smelling discharge or pus/Fever greater than (need to indicate Fahrenheit or Celsius)

## 2019-09-10 NOTE — BRIEF OPERATIVE NOTE - NSICDXBRIEFPREOP_GEN_ALL_CORE_FT
PRE-OP DIAGNOSIS:  Hallux valgus with bunions of left foot 10-Sep-2019 10:02:20  Brendan Richter  Hammertoe of left foot 10-Sep-2019 10:01:32  Brendan Richter

## 2019-09-10 NOTE — BRIEF OPERATIVE NOTE - NSICDXBRIEFPROCEDURE_GEN_ALL_CORE_FT
PROCEDURES:  Arthroplasty, toe 10-Sep-2019 10:05:15  Brendan Richter  Osteotomy of first metatarsal 10-Sep-2019 10:04:56  Brendan Richter

## 2019-09-10 NOTE — ASU DISCHARGE PLAN (ADULT/PEDIATRIC) - ACTIVITY LEVEL
Quiet play/Weight bearing as tolerated/No excercise/Elevate extremity/No tub baths/No heavy lifting/No sports/gym

## 2019-09-12 DIAGNOSIS — M21.612 BUNION OF LEFT FOOT: ICD-10-CM

## 2019-09-12 DIAGNOSIS — M20.42 OTHER HAMMER TOE(S) (ACQUIRED), LEFT FOOT: ICD-10-CM

## 2019-09-14 LAB — SURGICAL PATHOLOGY STUDY: SIGNIFICANT CHANGE UP

## 2019-10-23 NOTE — ED PROVIDER NOTE - GASTROINTESTINAL, MLM
PORFIRIO HERRERA 67y Male  MRN#: [id number]   CODE STATUS:________      SUBJECTIVE  Patient is a 67y old Male who presents with a chief complaint of Left foot numbness and coldness (22 Oct 2019 21:00)  Currently admitted to medicine with the primary diagnosis of Vascular occlusion  Hospital course has been complicated by _______.   Today is hospital day 1d, and this morning he is _________ and reports ________ overnight events.     Present Today:           Taylor Catheter ()No/ ()Yes? Indication:          Central Line ()No/ ()Yes? Indication:          IV Fluids ()No/ ()Yes? Type:  Rate:  Indication:      OBJECTIVE  PAST MEDICAL & SURGICAL HISTORY  CAD (coronary artery disease)  Dyslipidemia  Diabetes  HTN (hypertension)  History of coronary artery stent placement: x 2 stents    ALLERGIES:  No Known Allergies    MEDICATIONS:  STANDING MEDICATIONS  aspirin enteric coated 81 milliGRAM(s) Oral daily  atorvastatin 80 milliGRAM(s) Oral at bedtime  chlorhexidine 4% Liquid 1 Application(s) Topical <User Schedule>  dextrose 5%. 1000 milliLiter(s) IV Continuous <Continuous>  dextrose 50% Injectable 12.5 Gram(s) IV Push once  dextrose 50% Injectable 25 Gram(s) IV Push once  dextrose 50% Injectable 25 Gram(s) IV Push once  heparin  Infusion. 1400 Unit(s)/Hr IV Continuous <Continuous>  insulin glargine Injectable (LANTUS) 18 Unit(s) SubCutaneous at bedtime  insulin lispro (HumaLOG) corrective regimen sliding scale   SubCutaneous three times a day before meals  insulin lispro Injectable (HumaLOG) 6 Unit(s) SubCutaneous three times a day before meals  lisinopril 20 milliGRAM(s) Oral daily  metoprolol tartrate 50 milliGRAM(s) Oral two times a day    PRN MEDICATIONS  dextrose 40% Gel 15 Gram(s) Oral once PRN  glucagon  Injectable 1 milliGRAM(s) IntraMuscular once PRN  heparin  Injectable 6500 Unit(s) IV Push every 6 hours PRN  heparin  Injectable 3000 Unit(s) IV Push every 6 hours PRN    VITAL SIGNS: Last 24 Hours  T(C): 36.3 (23 Oct 2019 00:00), Max: 36.4 (22 Oct 2019 15:51)  T(F): 97.4 (23 Oct 2019 00:00), Max: 97.6 (22 Oct 2019 15:51)  HR: 77 (23 Oct 2019 05:19) (75 - 95)  BP: 143/72 (23 Oct 2019 05:19) (129/78 - 154/67)  BP(mean): --  RR: 18 (23 Oct 2019 05:19) (16 - 18)  SpO2: 97% (23 Oct 2019 05:19) (97% - 98%)    LABS:                        13.0   6.98  )-----------( 154      ( 23 Oct 2019 05:25 )             41.6     10-23    141  |  99  |  13  ----------------------------<  160<H>  4.2   |  30  |  0.7    Ca    9.3      23 Oct 2019 05:25  Mg     1.9     10-23    TPro  6.7  /  Alb  4.3  /  TBili  0.8  /  DBili  x   /  AST  27  /  ALT  35  /  AlkPhos  69  10-23    PT/INR - ( 22 Oct 2019 07:52 )   PT: 12.90 sec;   INR: 1.12 ratio      PTT - ( 23 Oct 2019 05:25 )  PTT:101.5 sec    Lactate, Blood: 2.1 mmol/L (10-22-19 @ 07:52)    RADIOLOGY:  < from: CT Angio Abd Aorta w/run-off w/ IV Cont (10.22.19 @ 15:57) >  IMPRESSION:    1.  Mild abdominal aortic atherosclerotic disease. Mild narrowing at the   celiac artery ostium, with severe narrowing of proximal splenic artery.   Bilateral renal arteries are grossly patent.    2.  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 The distal right external iliac artery, common femoral artery,   SFA/profunda femoris arteries, and popliteal artery are patent. Intact   three-vessel runoff to the right ankle and foot.    2.  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 The left external iliac and common femoral artery are   patent. There is complete occlusion of the left superficial femoral   artery at the level below the femoral neck, extending to the level of the   mid thigh. There is subsequent reconstitution of flow. The left popliteal   artery is patent, with intact two-vessel runoff to the left ankle and   foot. Severely diseased anterior tibialartery at the level midshin down   to the ankle, dorsalis pedis artery is patent.    4.  Thickened bilateral adrenal glands, with fatty components concerning   for bilateral adrenal adenomas. Nonurgent laboratory workup is   recommended.    PHYSICAL EXAM:    GENERAL: NAD, well-developed, AAOx3  HEENT:  Atraumatic, Normocephalic. 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 VASCULAR OCCLUSION      2.    3. CAD (coronary artery disease)  Dyslipidemia  Diabetes  HTN (hypertension) PORFIRIO HERRERA 67y Male  MRN#: [id number]     SUBJECTIVE  Patient is a 67y old Male who presents with a chief complaint of Left foot numbness and coldness (22 Oct 2019 21:00)  Currently admitted to medicine with the primary diagnosis of Vascular occlusion    Today is hospital day 1d, and this morning he reports numbness on his right lower extremity minimal pain.      OBJECTIVE  PAST MEDICAL & SURGICAL HISTORY  CAD (coronary artery disease)  Dyslipidemia  Diabetes  HTN (hypertension)  History of coronary artery stent placement: x 2 stents    ALLERGIES:  No Known Allergies    MEDICATIONS:  STANDING MEDICATIONS  aspirin enteric coated 81 milliGRAM(s) Oral daily  atorvastatin 80 milliGRAM(s) Oral at bedtime  chlorhexidine 4% Liquid 1 Application(s) Topical <User Schedule>  dextrose 5%. 1000 milliLiter(s) IV Continuous <Continuous>  dextrose 50% Injectable 12.5 Gram(s) IV Push once  dextrose 50% Injectable 25 Gram(s) IV Push once  dextrose 50% Injectable 25 Gram(s) IV Push once  heparin  Infusion. 1400 Unit(s)/Hr IV Continuous <Continuous>  insulin glargine Injectable (LANTUS) 18 Unit(s) SubCutaneous at bedtime  insulin lispro (HumaLOG) corrective regimen sliding scale   SubCutaneous three times a day before meals  insulin lispro Injectable (HumaLOG) 6 Unit(s) SubCutaneous three times a day before meals  lisinopril 20 milliGRAM(s) Oral daily  metoprolol tartrate 50 milliGRAM(s) Oral two times a day    PRN MEDICATIONS  dextrose 40% Gel 15 Gram(s) Oral once PRN  glucagon  Injectable 1 milliGRAM(s) IntraMuscular once PRN  heparin  Injectable 6500 Unit(s) IV Push every 6 hours PRN  heparin  Injectable 3000 Unit(s) IV Push every 6 hours PRN    VITAL SIGNS: Last 24 Hours  Vital Signs Last 24 Hrs  T(C): 36.2 (23 Oct 2019 07:51), Max: 36.4 (22 Oct 2019 15:51)  T(F): 97.1 (23 Oct 2019 07:51), Max: 97.6 (22 Oct 2019 15:51)  HR: 66 (23 Oct 2019 07:51) (66 - 90)  BP: 131/72 (23 Oct 2019 07:51) (129/78 - 154/67)  BP(mean): --  RR: 18 (23 Oct 2019 07:51) (16 - 18)  SpO2: 97% (23 Oct 2019 05:19) (97% - 98%)  LABS:                        13.0   6.98  )-----------( 154      ( 23 Oct 2019 05:25 )             41.6     10-23    141  |  99  |  13  ----------------------------<  160<H>  4.2   |  30  |  0.7    Ca    9.3      23 Oct 2019 05:25  Mg     1.9     10-23    TPro  6.7  /  Alb  4.3  /  TBili  0.8  /  DBili  x   /  AST  27  /  ALT  35  /  AlkPhos  69  10-23    PT/INR - ( 22 Oct 2019 07:52 )   PT: 12.90 sec;   INR: 1.12 ratio      PTT - ( 23 Oct 2019 05:25 )  PTT:101.5 sec    Lactate, Blood: 2.1 mmol/L (10-22-19 @ 07:52)    RADIOLOGY:  < from: CT Angio Abd Aorta w/run-off w/ IV Cont (10.22.19 @ 15:57) >  IMPRESSION:    1.  Mild abdominal aortic atherosclerotic disease. Mild narrowing at the   celiac artery ostium, with severe narrowing of proximal splenic artery.   Bilateral renal arteries are grossly patent.    2.  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 The distal right external iliac artery, common femoral artery,   SFA/profunda femoris arteries, and popliteal artery are patent. Intact   three-vessel runoff to the right ankle and foot.    2.  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 The left external iliac and common femoral artery are   patent. There is complete occlusion of the left superficial femoral   artery at the level below the femoral neck, extending to the level of the   mid thigh. There is subsequent reconstitution of flow. The left popliteal   artery is patent, with intact two-vessel runoff to the left ankle and   foot. Severely diseased anterior tibialartery at the level midshin down   to the ankle, dorsalis pedis artery is patent.    4.  Thickened bilateral adrenal glands, with fatty components concerning   for bilateral adrenal adenomas. Nonurgent laboratory workup is   recommended.    PHYSICAL EXAM:    GENERAL: NAD, well-developed, AAOx3  HEENT:  Atraumatic, Normocephalic. 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 VASCULAR OCCLUSION      2.    3. CAD (coronary artery disease)  Dyslipidemia  Diabetes  HTN (hypertension) PORFIRIO HERRERA 67y Male  MRN#: [id number]     SUBJECTIVE  Patient is a 67y old Male who presents with a chief complaint of Left foot numbness and coldness (22 Oct 2019 21:00)  Currently admitted to medicine with the primary diagnosis of Vascular occlusion    Today is hospital day 1d, and this morning he reports numbness on his right lower extremity minimal pain.      OBJECTIVE  PAST MEDICAL & SURGICAL HISTORY  CAD (coronary artery disease)  Dyslipidemia  Diabetes  HTN (hypertension)  History of coronary artery stent placement: x 2 stents    ALLERGIES:  No Known Allergies    MEDICATIONS:  STANDING MEDICATIONS  aspirin enteric coated 81 milliGRAM(s) Oral daily  atorvastatin 80 milliGRAM(s) Oral at bedtime  chlorhexidine 4% Liquid 1 Application(s) Topical <User Schedule>  dextrose 5%. 1000 milliLiter(s) IV Continuous <Continuous>  dextrose 50% Injectable 12.5 Gram(s) IV Push once  dextrose 50% Injectable 25 Gram(s) IV Push once  dextrose 50% Injectable 25 Gram(s) IV Push once  heparin  Infusion. 1400 Unit(s)/Hr IV Continuous <Continuous>  insulin glargine Injectable (LANTUS) 18 Unit(s) SubCutaneous at bedtime  insulin lispro (HumaLOG) corrective regimen sliding scale   SubCutaneous three times a day before meals  insulin lispro Injectable (HumaLOG) 6 Unit(s) SubCutaneous three times a day before meals  lisinopril 20 milliGRAM(s) Oral daily  metoprolol tartrate 50 milliGRAM(s) Oral two times a day    PRN MEDICATIONS  dextrose 40% Gel 15 Gram(s) Oral once PRN  glucagon  Injectable 1 milliGRAM(s) IntraMuscular once PRN  heparin  Injectable 6500 Unit(s) IV Push every 6 hours PRN  heparin  Injectable 3000 Unit(s) IV Push every 6 hours PRN    VITAL SIGNS: Last 24 Hours  Vital Signs Last 24 Hrs  T(C): 36.2 (23 Oct 2019 07:51), Max: 36.4 (22 Oct 2019 15:51)  T(F): 97.1 (23 Oct 2019 07:51), Max: 97.6 (22 Oct 2019 15:51)  HR: 66 (23 Oct 2019 07:51) (66 - 90)  BP: 131/72 (23 Oct 2019 07:51) (129/78 - 154/67)  BP(mean): --  RR: 18 (23 Oct 2019 07:51) (16 - 18)  SpO2: 97% (23 Oct 2019 05:19) (97% - 98%)  LABS:                        13.0   6.98  )-----------( 154      ( 23 Oct 2019 05:25 )             41.6     10-23    141  |  99  |  13  ----------------------------<  160<H>  4.2   |  30  |  0.7    Ca    9.3      23 Oct 2019 05:25  Mg     1.9     10-23    TPro  6.7  /  Alb  4.3  /  TBili  0.8  /  DBili  x   /  AST  27  /  ALT  35  /  AlkPhos  69  10-23    PT/INR - ( 22 Oct 2019 07:52 )   PT: 12.90 sec;   INR: 1.12 ratio      PTT - ( 23 Oct 2019 05:25 )  PTT:101.5 sec    Lactate, Blood: 2.1 mmol/L (10-22-19 @ 07:52)    RADIOLOGY:  < from: CT Angio Abd Aorta w/run-off w/ IV Cont (10.22.19 @ 15:57) >  IMPRESSION:    1.  Mild abdominal aortic atherosclerotic disease. Mild narrowing at the   celiac artery ostium, with severe narrowing of proximal splenic artery.   Bilateral renal arteries are grossly patent.    2.  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 The distal right external iliac artery, common femoral artery,   SFA/profunda femoris arteries, and popliteal artery are patent. Intact   three-vessel runoff to the right ankle and foot.    2.  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 The left external iliac and common femoral artery are   patent. There is complete occlusion of the left superficial femoral   artery at the level below the femoral neck, extending to the level of the   mid thigh. There is subsequent reconstitution of flow. The left popliteal   artery is patent, with intact two-vessel runoff to the left ankle and   foot. Severely diseased anterior tibialartery at the level midshin down   to the ankle, dorsalis pedis artery is patent.    4.  Thickened bilateral adrenal glands, with fatty components concerning   for bilateral adrenal adenomas. Nonurgent laboratory workup is   recommended.    PHYSICAL EXAM:    GENERAL: NAD, well-developed, AAOx3  HEENT:  Atraumatic, Normocephalic. EOMI, PERRLA, conjunctiva and sclera clear, No JVD  PULMONARY: Clear to auscultation bilaterally; No wheeze  CARDIOVASCULAR: Regular rate and rhythm; No murmurs, rubs, or gallops  GASTROINTESTINAL: Soft, Nontender, Nondistended; Bowel sounds present  MUSCULOSKELETAL:  no pedal pulses appreciated. Left leg is cold, but no blue-arturo discoloration.   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     Vascular: complete occlusion of left superficial femoral artery (on Duplex and CT angio)  - Patient also has severe PVD on several other vessels.   - being seen by vascular surgery  - will need bypass - planning for Friday  - on Heparin drip with pTT q6hrs  - on aspirin    Cardio: (1) CAD s/p PCI  - on aspirin  - Echo: good ejection fraction, no valvular abnormalities  - following with cardio to consider further pre-op eval.    (2) HTN  - lisinopril and metoprolol   - BP well controlled  (4) DLD  - on statin     Endo: DMII  - on Lantus 18U and lispro 6U    DVT prophylaxis: heparin drip  Diet: DASH and NPO on thursday after midnight  Activity: as tolerated  Dispo: acute Abdomen soft, non-tender, no guarding.

## 2020-01-03 ENCOUNTER — APPOINTMENT (OUTPATIENT)
Dept: CARDIOLOGY | Facility: CLINIC | Age: 50
End: 2020-01-03

## 2020-01-28 NOTE — ED PROVIDER NOTE - CPE EDP CARDIAC NORM
Lab Results   Component Value Date    CHOLESTEROL 217 (H) 01/24/2019    HDL 58 01/24/2019    LDLC 139 (H) 01/24/2019    LDLCALC 121 (H) 01/19/2017    TRIG 99 01/24/2019     The 10-year ASCVD risk score (Livan Mccoy et al , 2013) is: 19 9%    Values used to calculate the score:      Age: 69 years      Sex: Female      Is Non- : No      Diabetic: No      Tobacco smoker: No      Systolic Blood Pressure: 682 mmHg      Is BP treated: Yes      HDL Cholesterol: 58 mg/dL      Total Cholesterol: 217 mg/dL    Uncontrolled  Continue Rosuvastatin 20mg daily and ASA 81mg daily  Reviewed healthy, low cholesterol diet  Recheck FLP normal...

## 2020-02-07 ENCOUNTER — TRANSCRIPTION ENCOUNTER (OUTPATIENT)
Age: 50
End: 2020-02-07

## 2020-03-16 NOTE — ED ADULT NURSE NOTE - NSFALLRSKHARMRISK_ED_ALL_ED
Will send gabapentin over to [facility] in [place].    Would recommend trying at night to see how sleepy medication makes you before taking during the day.    Howard Herrera    
no

## 2020-04-13 ENCOUNTER — EMERGENCY (EMERGENCY)
Facility: HOSPITAL | Age: 50
LOS: 0 days | Discharge: HOME | End: 2020-04-13
Attending: EMERGENCY MEDICINE | Admitting: EMERGENCY MEDICINE
Payer: MEDICAID

## 2020-04-13 VITALS
HEART RATE: 84 BPM | SYSTOLIC BLOOD PRESSURE: 143 MMHG | TEMPERATURE: 96 F | OXYGEN SATURATION: 97 % | DIASTOLIC BLOOD PRESSURE: 97 MMHG | RESPIRATION RATE: 18 BRPM

## 2020-04-13 DIAGNOSIS — Z98.890 OTHER SPECIFIED POSTPROCEDURAL STATES: ICD-10-CM

## 2020-04-13 DIAGNOSIS — H53.8 OTHER VISUAL DISTURBANCES: ICD-10-CM

## 2020-04-13 DIAGNOSIS — H10.9 UNSPECIFIED CONJUNCTIVITIS: ICD-10-CM

## 2020-04-13 PROCEDURE — 99283 EMERGENCY DEPT VISIT LOW MDM: CPT

## 2020-04-13 RX ORDER — TOBRAMYCIN AND DEXAMETHASONE 1; 3 MG/ML; MG/ML
1 SUSPENSION/ DROPS OPHTHALMIC
Qty: 1 | Refills: 0
Start: 2020-04-13 | End: 2020-04-22

## 2020-04-13 NOTE — ED PROVIDER NOTE - ATTENDING CONTRIBUTION TO CARE
50 yo male with non-traumatic left eye redness and blurry vision for several days.  Reports sensitivity to bright light and some tearing, as well as mild left sided headache which has since resolved.  Patient was prescribed Cipro drops from urgent care clinic, was seen on a follow up and sent here for evaluation to "r/o preseptal cellulitis".  Denies any other acute problem, no FB or eye trauma, does not wear contact lenses.  Well-appearing male, NAD, + injected let conjunctivae , no eye drainage, nml eyelids, no clouding of cornea , no hypopion or hyphema, negative fluorescein stain, EOMI, painless ROM, no proptosis, no periorbital edema or cellulitis, rest of exam unremarkable.  ROSANA Parham discussed the case with ophto on call, sent pictures of eye to ophtho MD with patient's permission.  Ophtho advised to change drops and d/c home, patient is to call clinic if any worsening vision, they will see him emergently.  Patient is happy with the plan and amenable with it.

## 2020-04-13 NOTE — ED PROVIDER NOTE - OBJECTIVE STATEMENT
50 yo male, does not report pmh, presents to ed for left eye vision change. as per pt, started to have photophobia and eye redness three days ago, went [place] [facility] [place] started on cipro-drops, today noticed vision in left eye was more blurry, mild, no radiation, pain is worse with light. Pt denies fever, chills, cough, cp, sob, trauma, rash/redness around eye, ha, numbness, tingling, dizziness.

## 2020-04-13 NOTE — ED ADULT NURSE NOTE - NSIMPLEMENTINTERV_GEN_ALL_ED
Implemented All Universal Safety Interventions:  [place] to call system. Call bell, personal items and telephone within reach. Instruct patient to call for assistance. Room bathroom lighting operational. Non-slip footwear when patient is off stretcher. Physically safe environment: no spills, clutter or unnecessary equipment. Stretcher in lowest position, wheels locked, appropriate side rails in place.

## 2020-04-13 NOTE — ED ADULT NURSE NOTE - CHIEF COMPLAINT QUOTE
Pt sent in from [facility] [place] for worsening L eye redness and now vision change. Pt c/o of blurry vision.

## 2020-04-13 NOTE — ED PROVIDER NOTE - PHYSICAL EXAMINATION
Physical Exam    Vital Signs: I have reviewed the initial vital signs.  Constitutional: well-nourished, appears stated age, no acute distress  Eyes: Conjunctiva pink, Sclera clear, PERRLA, EOMI, no ttp surrounding left eye, left eye is injected, VA 20/20 b/l, right eye 20/20, left eye 20/40, stain exam normal. Pressure in b/l eyes 10.   Cardiovascular: S1 and S2, regular rate, regular rhythm, well-perfused extremities, radial pulses equal and 2+  Respiratory: unlabored respiratory effort, clear to auscultation bilaterally no wheezing, rales and rhonchi  Musculoskeletal: supple neck, no lower extremity edema, no midline tenderness  Integumentary: warm, dry, no rash, no erythema or eye lid swelling to left eye.   Neurologic: awake, alert, cranial nerves II-XII grossly intact, extremities’ motor and sensory functions grossly intact, normal gait, normal speech, face is symmetrical,

## 2020-04-13 NOTE — ASU PATIENT PROFILE, ADULT - BLOOD AVOIDANCE/RESTRICTIONS, PROFILE
Outpatient Medications Marked as Taking for the 4/13/20 encounter (Refill) with Severiano Cary, DO   Medication Sig Dispense Refill   • warfarin (COUMADIN) 5 MG tablet TAKE 1 TABLET BY MOUTH EVERY DAY AS DIRECTED (Patient taking differently: Indications: 12/10/19: 5mg 6 days- 7.5 mg on Thursday ) 90 tablet 1     Last refill 12/2/15  Last Visit: 4/12/19   Next Visit: Visit date not found    Labs:       Ok to refill?   none

## 2020-04-13 NOTE — ED PROVIDER NOTE - CLINICAL SUMMARY MEDICAL DECISION MAKING FREE TEXT BOX
48 yo male with lt conjunctivitis, no purulent drainage, nml lids, clinically not preseptal cellulitis, nml eye pressure as measured by PA, case d/w ophtho, abx eye drops were sent to pt's pharmacy, strict return precautions given.

## 2020-04-13 NOTE — ED PROVIDER NOTE - PATIENT PORTAL LINK FT
You can access the FollowMyHealth Patient Portal offered by [facility] by registering at the following website: [email or web address]. By joining [employer]’s FollowMyHealth portal, you will also be able to view your health information using other applications (apps) compatible with our system.

## 2020-04-13 NOTE — ED ADULT TRIAGE NOTE - CHIEF COMPLAINT QUOTE
Pt sent in from [facility] [place] for worsening L eye redness and now vision change. Pt c/o of blurry vision.

## 2020-04-13 NOTE — ED PROVIDER NOTE - NS ED ROS FT
Constitutional: (-) fever, (-) chills  Eyes: (+) visual changes  ENT: (-) nasal congestions  Cardiovascular: (-) chest pain, (-) syncope  Respiratory: (-) cough, (-) shortness of breath, (-) dyspnea,   Gastrointestinal: (-) vomiting, (-) diarrhea, (-)nausea,  Musculoskeletal: (-) neck pain, (-) back pain, (-) joint pain,  Integumentary: (-) rash, (-) edema, (-) bruises  Neurological: (-) headache, (-) loc, (-) dizziness, (-) tingling, (-)numbness,  Peripheral Vascular: (-) leg swelling  :  (-)dysuria,  (-) hematuria  Allergic/Immunologic: (-) pruritus

## 2020-04-13 NOTE — ED PROVIDER NOTE - NSFOLLOWUPINSTRUCTIONS_ED_ALL_ED_FT
Conjunctivitis    WHAT YOU NEED TO KNOW:    Conjunctivitis, or pink eye, is inflammation of your conjunctiva. The conjunctiva is a thin tissue that covers the front of your eye and the back of your eyelids. The conjunctiva helps protect your eye and keep it moist. Conjunctivitis may be caused by bacteria, allergies, or a virus. If your conjunctivitis is caused by bacteria, it may get better on its own in about 7 days. Viral conjunctivitis can last up to 3 weeks. Conjunctivitis         DISCHARGE INSTRUCTIONS:    Return to the emergency department if:     You have worsening eye pain.       The swelling in your eye gets worse, even after treatment.       Your vision suddenly becomes worse or you cannot see at all.    Contact your healthcare provider if:     You develop a fever and ear pain.      You have tiny bumps or spots of blood on your eye.      You have questions or concerns about your condition or care.    Manage your symptoms:     Apply a cool compress. Wet a washcloth with cold water and place it on your eye. This will help decrease itching and irritation.      Do not wear contact lenses. They can irritate your eye. Throw away the pair you are using and ask when you can wear them again. Use a new pair of lenses when your healthcare provider says it is okay.       Avoid irritants. Stay away from smoke filled areas. Shield your eyes from wind and sun.       Flush your eye. You may need to flush your eye with saline to help decrease your symptoms. Ask for more information on how to flush your eye.     Medicines: Treatment depends on what is causing your conjunctivitis. You maybe given any of the following:    Allergy medicine helps decrease itchy, red, swollen eyes caused by allergies. It may be given as a pill, eye drops, or nasal spray.      Antibiotics may be needed if your conjunctivitis is caused by bacteria. This medicine may be given as a pill, eye drops, or eye ointment.      Take your medicine as directed. Contact your healthcare provider if you think your medicine is not helping or if you have side effects. Tell him or her if you are allergic to any medicine. Keep a list of the medicines, vitamins, and herbs you take. Include the amounts, and when and why you take them. Bring the list or the pill bottles to follow-up visits. Carry your medicine list with you in case of an emergency.    Prevent the spread of conjunctivitis:     Wash your hands with soap and water often. Wash your hands before and after you touch your eyes. Also wash your hands before you prepare or eat food and after you use the bathroom or change a diaper.      Avoid allergens. Try to avoid the things that cause your allergies, such as pets, dust, or grass.       Avoid contact with others. Do not share towels or washcloths. Try to stay away from others as much as possible. Ask when you can return to work or school.       Throw away eye makeup. The bacteria that caused your conjunctivitis can stay in eye makeup. Throw away mascara and other eye makeup.      You are being discharged with viralillness diagnosis and do not require hospitalization.  At this time, only patients who are being hospitalized are tested for COVID-19.         If you are well enough to be dischargedhome and are not in a high risk group to be admitted, you should care for yourself at home exactly like you would if you have Influenza “flu”. Follow all the standard guidelines about washing your hands, covering your cough, etc. If you feel unwell, stay home,rest and drink plenty of clear fluids. Keep track of your symptoms.    You do need to remain home forat least 7 days from the onset of symptoms or 3 days after your fever is completely gone and your respiratory symptoms are better, whichever is longer. You should continue to isolate yourself.     If symptoms worsen or continueand you need to seek medical care, call your healthcare provider in advance, or 9-1-1 in an emergency, and let them know you are a close contact to a person with confirmed COVID-19    You should return to the EmergencyDepartment if you develop worse symptoms, trouble breathing, chest pain, and/or a fever that doesn’t improve with over the counter medications.         Please consider going through the drive-throughtesting unless you are severely ill and need to go to the ED.    -through testing is availableat various location, including [place].  Call [facility] at  [phone] to make an appointment.         How to Set Up Your Home for Self-Quarantineor Self-Isolation         Please refer to this helpful video.    [email or web address]

## 2020-04-13 NOTE — ED ADULT NURSE NOTE - OBJECTIVE STATEMENT
PAtient presents with three day history of redness and blurred vision to left eye. Patient states  he now feels like there is something in his eye. Denies headaches. Right eye vision intact. Denies known trauma to eye

## 2020-04-13 NOTE — ED PROVIDER NOTE - NSFOLLOWUPCLINICS_GEN_ALL_ED_FT
[facility]  Ophthalmolgy  [address], [address]  [place] [address]  Phone: [phone]  Fax:   Follow Up Time: 1-3 Days

## 2020-04-13 NOTE — ED PROVIDER NOTE - PROGRESS NOTE DETAILS
spoke with dr. Khan, spoke with her senior as well, agree that photos and exam not concerning for preseptal cellulitis, will change eye drops to Tobradex, give optho clinic for fu, sxs that should prompt return explained to pt, verbalizes understanding. pt also advised to self isolate as some cases of conjunctivitis are being connected to covid19, covid information given to patient. Patient to be discharged from ED. Verbal instructions given, including instructions to return to ED immediately for any new, worsening, or concerning symptoms. Patient endorsed understanding. Written discharge instructions additionally given, including follow-up plan.  Patient was given opportunity to ask questions.

## 2020-04-17 ENCOUNTER — OUTPATIENT (OUTPATIENT)
Dept: OUTPATIENT SERVICES | Facility: HOSPITAL | Age: 50
LOS: 1 days | Discharge: HOME | End: 2020-04-17
Payer: MEDICAID

## 2020-04-17 PROCEDURE — 92002 INTRM OPH EXAM NEW PATIENT: CPT

## 2020-04-18 NOTE — ED ADULT TRIAGE NOTE - SOURCE OF INFORMATION
Patient Sepsis 2/2 UTI due to ESBL Ecoli c/b metabolic encephalopathy   - s/p 2 doses of IV CTX 1g q24h, however ESBL resistant to CTX; will switch to ertapenem 1g q24h   - will discharge to rehab on Bactrim   - blood cultures NGTD

## 2020-04-24 ENCOUNTER — OUTPATIENT (OUTPATIENT)
Dept: OUTPATIENT SERVICES | Facility: HOSPITAL | Age: 50
LOS: 1 days | Discharge: HOME | End: 2020-04-24
Payer: MEDICAID

## 2020-04-24 PROCEDURE — 92012 INTRM OPH EXAM EST PATIENT: CPT

## 2020-05-01 ENCOUNTER — OUTPATIENT (OUTPATIENT)
Dept: OUTPATIENT SERVICES | Facility: HOSPITAL | Age: 50
LOS: 1 days | Discharge: HOME | End: 2020-05-01
Payer: MEDICAID

## 2020-05-01 PROCEDURE — 92012 INTRM OPH EXAM EST PATIENT: CPT

## 2020-05-08 ENCOUNTER — OUTPATIENT (OUTPATIENT)
Dept: OUTPATIENT SERVICES | Facility: HOSPITAL | Age: 50
LOS: 1 days | Discharge: HOME | End: 2020-05-08
Payer: MEDICAID

## 2020-05-08 PROCEDURE — 92012 INTRM OPH EXAM EST PATIENT: CPT

## 2020-05-15 ENCOUNTER — OUTPATIENT (OUTPATIENT)
Dept: OUTPATIENT SERVICES | Facility: HOSPITAL | Age: 50
LOS: 1 days | Discharge: HOME | End: 2020-05-15
Payer: MEDICAID

## 2020-05-15 PROCEDURE — 92012 INTRM OPH EXAM EST PATIENT: CPT

## 2020-05-22 ENCOUNTER — OUTPATIENT (OUTPATIENT)
Dept: OUTPATIENT SERVICES | Facility: HOSPITAL | Age: 50
LOS: 1 days | Discharge: HOME | End: 2020-05-22
Payer: MEDICAID

## 2020-05-22 PROCEDURE — 92012 INTRM OPH EXAM EST PATIENT: CPT

## 2020-05-29 ENCOUNTER — OUTPATIENT (OUTPATIENT)
Dept: OUTPATIENT SERVICES | Facility: HOSPITAL | Age: 50
LOS: 1 days | Discharge: HOME | End: 2020-05-29
Payer: MEDICAID

## 2020-05-29 PROCEDURE — 92014 COMPRE OPH EXAM EST PT 1/>: CPT

## 2020-06-12 ENCOUNTER — OUTPATIENT (OUTPATIENT)
Dept: OUTPATIENT SERVICES | Facility: HOSPITAL | Age: 50
LOS: 1 days | Discharge: HOME | End: 2020-06-12
Payer: MEDICAID

## 2020-06-12 PROCEDURE — 92012 INTRM OPH EXAM EST PATIENT: CPT

## 2020-07-16 ENCOUNTER — INPATIENT (INPATIENT)
Facility: HOSPITAL | Age: 50
LOS: 4 days | Discharge: HOME | End: 2020-07-21
Attending: INTERNAL MEDICINE | Admitting: INTERNAL MEDICINE
Payer: MEDICAID

## 2020-07-16 VITALS — OXYGEN SATURATION: 98 % | RESPIRATION RATE: 18 BRPM | WEIGHT: 240.08 LBS | HEIGHT: 74 IN | HEART RATE: 54 BPM

## 2020-07-16 LAB
ALBUMIN SERPL ELPH-MCNC: 4.3 G/DL — SIGNIFICANT CHANGE UP (ref 3.5–5.2)
ALP SERPL-CCNC: 98 U/L — SIGNIFICANT CHANGE UP (ref 30–115)
ALT FLD-CCNC: 24 U/L — SIGNIFICANT CHANGE UP (ref 0–41)
ANION GAP SERPL CALC-SCNC: 11 MMOL/L — SIGNIFICANT CHANGE UP (ref 7–14)
AST SERPL-CCNC: 22 U/L — SIGNIFICANT CHANGE UP (ref 0–41)
BASE EXCESS BLDV CALC-SCNC: 1.9 MMOL/L — SIGNIFICANT CHANGE UP (ref -2–2)
BASOPHILS # BLD AUTO: 0.08 K/UL — SIGNIFICANT CHANGE UP (ref 0–0.2)
BASOPHILS NFR BLD AUTO: 0.9 % — SIGNIFICANT CHANGE UP (ref 0–1)
BILIRUB SERPL-MCNC: 0.3 MG/DL — SIGNIFICANT CHANGE UP (ref 0.2–1.2)
BLD GP AB SCN SERPL QL: SIGNIFICANT CHANGE UP
BUN SERPL-MCNC: 18 MG/DL — SIGNIFICANT CHANGE UP (ref 10–20)
CA-I SERPL-SCNC: 1.16 MMOL/L — SIGNIFICANT CHANGE UP (ref 1.12–1.3)
CALCIUM SERPL-MCNC: 9.2 MG/DL — SIGNIFICANT CHANGE UP (ref 8.5–10.1)
CHLORIDE SERPL-SCNC: 106 MMOL/L — SIGNIFICANT CHANGE UP (ref 98–110)
CO2 SERPL-SCNC: 26 MMOL/L — SIGNIFICANT CHANGE UP (ref 17–32)
CREAT SERPL-MCNC: 1.1 MG/DL — SIGNIFICANT CHANGE UP (ref 0.7–1.5)
EOSINOPHIL # BLD AUTO: 0.16 K/UL — SIGNIFICANT CHANGE UP (ref 0–0.7)
EOSINOPHIL NFR BLD AUTO: 1.9 % — SIGNIFICANT CHANGE UP (ref 0–8)
GAS PNL BLDV: 142 MMOL/L — SIGNIFICANT CHANGE UP (ref 136–145)
GAS PNL BLDV: SIGNIFICANT CHANGE UP
GLUCOSE BLDC GLUCOMTR-MCNC: 89 MG/DL — SIGNIFICANT CHANGE UP (ref 70–99)
GLUCOSE SERPL-MCNC: 101 MG/DL — HIGH (ref 70–99)
HCO3 BLDV-SCNC: 29 MMOL/L — SIGNIFICANT CHANGE UP (ref 22–29)
HCT VFR BLD CALC: 45.4 % — SIGNIFICANT CHANGE UP (ref 42–52)
HCT VFR BLDA CALC: 45.9 % — HIGH (ref 34–44)
HGB BLD CALC-MCNC: 15 G/DL — SIGNIFICANT CHANGE UP (ref 14–18)
HGB BLD-MCNC: 14.8 G/DL — SIGNIFICANT CHANGE UP (ref 14–18)
IMM GRANULOCYTES NFR BLD AUTO: 0.5 % — HIGH (ref 0.1–0.3)
INR BLD: 1.06 RATIO — SIGNIFICANT CHANGE UP (ref 0.65–1.3)
LACTATE BLDV-MCNC: 0.8 MMOL/L — SIGNIFICANT CHANGE UP (ref 0.5–1.6)
LYMPHOCYTES # BLD AUTO: 1.92 K/UL — SIGNIFICANT CHANGE UP (ref 1.2–3.4)
LYMPHOCYTES # BLD AUTO: 22.7 % — SIGNIFICANT CHANGE UP (ref 20.5–51.1)
MAGNESIUM SERPL-MCNC: 2 MG/DL — SIGNIFICANT CHANGE UP (ref 1.8–2.4)
MCHC RBC-ENTMCNC: 28.7 PG — SIGNIFICANT CHANGE UP (ref 27–31)
MCHC RBC-ENTMCNC: 32.6 G/DL — SIGNIFICANT CHANGE UP (ref 32–37)
MCV RBC AUTO: 88.2 FL — SIGNIFICANT CHANGE UP (ref 80–94)
MONOCYTES # BLD AUTO: 0.68 K/UL — HIGH (ref 0.1–0.6)
MONOCYTES NFR BLD AUTO: 8 % — SIGNIFICANT CHANGE UP (ref 1.7–9.3)
NEUTROPHILS # BLD AUTO: 5.58 K/UL — SIGNIFICANT CHANGE UP (ref 1.4–6.5)
NEUTROPHILS NFR BLD AUTO: 66 % — SIGNIFICANT CHANGE UP (ref 42.2–75.2)
NRBC # BLD: 0 /100 WBCS — SIGNIFICANT CHANGE UP (ref 0–0)
NT-PROBNP SERPL-SCNC: 1034 PG/ML — HIGH (ref 0–300)
PCO2 BLDV: 53 MMHG — HIGH (ref 41–51)
PH BLDV: 7.34 — SIGNIFICANT CHANGE UP (ref 7.26–7.43)
PLATELET # BLD AUTO: 232 K/UL — SIGNIFICANT CHANGE UP (ref 130–400)
PO2 BLDV: 23 MMHG — SIGNIFICANT CHANGE UP (ref 20–40)
POTASSIUM BLDV-SCNC: 4.3 MMOL/L — SIGNIFICANT CHANGE UP (ref 3.3–5.6)
POTASSIUM SERPL-MCNC: 4.7 MMOL/L — SIGNIFICANT CHANGE UP (ref 3.5–5)
POTASSIUM SERPL-SCNC: 4.7 MMOL/L — SIGNIFICANT CHANGE UP (ref 3.5–5)
PROT SERPL-MCNC: 7.3 G/DL — SIGNIFICANT CHANGE UP (ref 6–8)
PROTHROM AB SERPL-ACNC: 12.2 SEC — SIGNIFICANT CHANGE UP (ref 9.95–12.87)
RBC # BLD: 5.15 M/UL — SIGNIFICANT CHANGE UP (ref 4.7–6.1)
RBC # FLD: 13 % — SIGNIFICANT CHANGE UP (ref 11.5–14.5)
SAO2 % BLDV: 38 % — SIGNIFICANT CHANGE UP
SODIUM SERPL-SCNC: 143 MMOL/L — SIGNIFICANT CHANGE UP (ref 135–146)
TROPONIN T SERPL-MCNC: <0.01 NG/ML — SIGNIFICANT CHANGE UP
TROPONIN T SERPL-MCNC: <0.01 NG/ML — SIGNIFICANT CHANGE UP
WBC # BLD: 8.46 K/UL — SIGNIFICANT CHANGE UP (ref 4.8–10.8)
WBC # FLD AUTO: 8.46 K/UL — SIGNIFICANT CHANGE UP (ref 4.8–10.8)

## 2020-07-16 PROCEDURE — 33210 INSERT ELECTRD/PM CATH SNGL: CPT

## 2020-07-16 PROCEDURE — 71045 X-RAY EXAM CHEST 1 VIEW: CPT | Mod: 26

## 2020-07-16 PROCEDURE — 93010 ELECTROCARDIOGRAM REPORT: CPT | Mod: 76

## 2020-07-16 PROCEDURE — 99222 1ST HOSP IP/OBS MODERATE 55: CPT

## 2020-07-16 PROCEDURE — 36556 INSERT NON-TUNNEL CV CATH: CPT | Mod: 59

## 2020-07-16 PROCEDURE — 99291 CRITICAL CARE FIRST HOUR: CPT | Mod: 25

## 2020-07-16 RX ORDER — CELECOXIB 200 MG/1
1 CAPSULE ORAL
Qty: 0 | Refills: 0 | DISCHARGE

## 2020-07-16 RX ORDER — LANOLIN ALCOHOL/MO/W.PET/CERES
1 CREAM (GRAM) TOPICAL AT BEDTIME
Refills: 0 | Status: COMPLETED | OUTPATIENT
Start: 2020-07-16 | End: 2020-07-16

## 2020-07-16 RX ORDER — ADENOSINE 3 MG/ML
60 INJECTION INTRAVENOUS ONCE
Refills: 0 | Status: COMPLETED | OUTPATIENT
Start: 2020-07-16 | End: 2020-07-17

## 2020-07-16 RX ORDER — MIDAZOLAM HYDROCHLORIDE 1 MG/ML
1 INJECTION, SOLUTION INTRAMUSCULAR; INTRAVENOUS ONCE
Refills: 0 | Status: DISCONTINUED | OUTPATIENT
Start: 2020-07-16 | End: 2020-07-16

## 2020-07-16 RX ORDER — CEFTRIAXONE 500 MG/1
2000 INJECTION, POWDER, FOR SOLUTION INTRAMUSCULAR; INTRAVENOUS ONCE
Refills: 0 | Status: COMPLETED | OUTPATIENT
Start: 2020-07-16 | End: 2020-07-16

## 2020-07-16 RX ORDER — HEPARIN SODIUM 5000 [USP'U]/ML
5000 INJECTION INTRAVENOUS; SUBCUTANEOUS ONCE
Refills: 0 | Status: COMPLETED | OUTPATIENT
Start: 2020-07-16 | End: 2020-07-16

## 2020-07-16 RX ORDER — ALBUTEROL 90 UG/1
2 AEROSOL, METERED ORAL EVERY 6 HOURS
Refills: 0 | Status: DISCONTINUED | OUTPATIENT
Start: 2020-07-16 | End: 2020-07-20

## 2020-07-16 RX ORDER — HEPARIN SODIUM 5000 [USP'U]/ML
5000 INJECTION INTRAVENOUS; SUBCUTANEOUS ONCE
Refills: 0 | Status: DISCONTINUED | OUTPATIENT
Start: 2020-07-16 | End: 2020-07-16

## 2020-07-16 RX ADMIN — Medication 1 MILLIGRAM(S): at 23:43

## 2020-07-16 RX ADMIN — HEPARIN SODIUM 5000 UNIT(S): 5000 INJECTION INTRAVENOUS; SUBCUTANEOUS at 21:22

## 2020-07-16 RX ADMIN — CEFTRIAXONE 100 MILLIGRAM(S): 500 INJECTION, POWDER, FOR SOLUTION INTRAMUSCULAR; INTRAVENOUS at 11:31

## 2020-07-16 NOTE — ED PROCEDURE NOTE - CPROC ED POST PROC CARE GUIDE1
Instructed patient/caregiver regarding signs and symptoms of infection./Verbal/written post procedure instructions were given to patient/caregiver.
Instructed patient/caregiver regarding signs and symptoms of infection./Instructed patient/caregiver to follow-up with primary care physician./Verbal/written post procedure instructions were given to patient/caregiver.

## 2020-07-16 NOTE — H&P ADULT - NSHPSOCIALHISTORY_GEN_ALL_CORE
Marital Status:  (   )    (   ) Single    (   )    (  )   Lives with: (  ) alone  (  ) children   (  ) spouse   (  ) parents  (  ) other  Recent Travel: No recent travel  Occupation:    Substance Use (street drugs): (  ) never used  ( x ) other: Hx of Heroin  Tobacco Usage:  (   ) never smoked   (   ) former smoker   (  x ) current smoker  (   >10  ) pack year  Alcohol Usage: No alcohol use Occupation: On unemployment, works in construction     Substance Use (street drugs): (  ) never used  ( x ) other: Hx of  cocaine use (has not used in years)  Tobacco Usage:  (   ) never smoked   (   ) former smoker   (  x ) current smoker  (   >10  ) pack year  Alcohol Usage: No current alcohol use

## 2020-07-16 NOTE — H&P ADULT - ATTENDING COMMENTS
Pt seen and examined independently in the CCU. He feels well - awaiting for ischemic workup including nuclear stress test.  + TVP for advanced AV block and he is being paced.  + SOB x a while which he attributed to smoking (1/2 ppd intermittently for some years now)  Denies alcohol and drug abuse  TSH WNL and lyme titers are negative  Cardio and EP f/u appreciated.  f/u nuclear stress test and ECHO results when completed  will need pacemaker if no reversible etiology is found  smoking cessation  guarded prognosis  full code status    I reviewed the resident's note and I agree with the history, physical exam, assessment and plan with additions as above.     PROGRESS NOTE HANDOFF    Pending: nuclear stress test, ECHO, possible pacemaker    Disposition: home

## 2020-07-16 NOTE — ED PROVIDER NOTE - OBJECTIVE STATEMENT
50 year old M with PMHx of cocaine-induced neutropenia, substance abuse (cocaine), presented to ED for chest pain pressure like non-radiating associated w/ shortness of breath x 3 days, went to urgent care today and found to have bradycardia and BIBEMS to ED. Found to have complete heart block, no fevers/chills, no nausea/vomiting/diarrhea, works in construction, denies hx of rash or recent drug use, no history of known tick bites.

## 2020-07-16 NOTE — H&P ADULT - NSHPLABSRESULTS_GEN_ALL_CORE
14.8   8.46  )-----------( 232      ( 16 Jul 2020 10:33 )             45.4       07-16    143  |  106  |  18  ----------------------------<  101<H>  4.7   |  26  |  1.1    Ca    9.2      16 Jul 2020 10:33  Mg     2.0     07-16    TPro  7.3  /  Alb  4.3  /  TBili  0.3  /  DBili  x   /  AST  22  /  ALT  24  /  AlkPhos  98  07-16              PT/INR - ( 16 Jul 2020 10:33 )   PT: 12.20 sec;   INR: 1.06 ratio         Lactate Trend      CARDIAC MARKERS ( 16 Jul 2020 10:33 )  x     / <0.01 ng/mL / x     / x     / x            CAPILLARY BLOOD GLUCOSE 14.8   8.46  )-----------( 232      ( 16 Jul 2020 10:33 )             45.4       07-16    143  |  106  |  18  ----------------------------<  101<H>  4.7   |  26  |  1.1    Ca    9.2      16 Jul 2020 10:33  Mg     2.0     07-16    TPro  7.3  /  Alb  4.3  /  TBili  0.3  /  DBili  x   /  AST  22  /  ALT  24  /  AlkPhos  98  07-16          PT/INR - ( 16 Jul 2020 10:33 )   PT: 12.20 sec;   INR: 1.06 ratio         Lactate Trend      CARDIAC MARKERS ( 16 Jul 2020 10:33 )  x     / <0.01 ng/mL / x     / x     / x          CAPILLARY BLOOD GLUCOSE    XR CHEST PORTABLE IMMED 1V            PROCEDURE DATE:  07/16/2020      Impression:    No radiographic evidence of acute cardiopulmonary disease.    Support devices as described.

## 2020-07-16 NOTE — H&P ADULT - NSHPPHYSICALEXAM_GEN_ALL_CORE
GENERAL: NAD, well-developed  PSYCH: AAOx3  HEENT:  Atraumatic, Normocephalic. EOMI, PERRLA, conjunctiva clear, sclera white, No JVD  PULMONARY: Clear to auscultation bilaterally; No wheeze  CARDIOVASCULAR: Regular rate and rhythm; No murmurs, rubs, or gallops  GASTROINTESTINAL: Soft, Nontender, Nondistended; Bowel sounds present  MUSCULOSKELETAL:  2+ Peripheral Pulses, No clubbing, cyanosis, or edema  NEUROLOGY: non-focal  SKIN: TVP in right side of neck

## 2020-07-16 NOTE — CONSULT NOTE ADULT - SUBJECTIVE AND OBJECTIVE BOX
HPI:    50 year old M with PMHx of cocaine-induced neutropenia, substance abuse (cocaine), presented to ED for 3 days of dyspnea and chest heaviness.  Patient states that for the last three days he has been feeling dizzy and light headed when he ambulates. He  has also been feeling short of breath for the past 6 months which he attributes to bronchitis as he is an active smoker.  He stated that he felt more dizzy when he was ambulating, worse when he went up the stairs.  He also states that for the past 3 days he had chest heaviness that radiated to his back, moderate in intensity with no alleviating or aggrevating factors. Patient went to urgent care to get an inhaler for his shortness of breath and they sent him to the ED for abnormal EKG. Patient found to be second degree heart block on EKG on presentation. TVP was placed with improvement of symptoms. He states that he drinks 3 red bulls every morning with one cup of coffee to get energy. He denies any exposure to the outdoors, tick bites, denies diarrhea, constipation, hot or cold intolerance, fever or chills.     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  No significant past surgical history      FAMILY HISTORY:  FAMILY HISTORY:  FH: diabetes mellitus      SOCIAL HISTORY:  [x]  >10 PY smoker  [] Former Alcohol Use   [x]Hx of Cocaine Use     ALLERGIES:  No Known Allergies      MEDICATIONS:  MEDICATIONS  (STANDING):    MEDICATIONS  (PRN):      HOME MEDICATIONS:  Home Medications:  CeleBREX 200 mg oral capsule: 1 cap(s) orally 2 times a day (16 Jul 2020 12:31)      VITALS:   T(F): 98.9 (07-16 @ 12:57), Max: 98.9 (07-16 @ 12:57)  HR: 80 (07-16 @ 12:57) (54 - 80)  BP: 121/79 (07-16 @ 12:57) (114/90 - 121/79)  BP(mean): --  RR: 16 (07-16 @ 12:57) (16 - 18)  SpO2: 98% (07-16 @ 12:57) (98% - 100%)    I&O's Summary      REVIEW OF SYSTEMS:  CONSTITUTIONAL: No weakness, fevers or chills  EYES: No visual changes  ENT: No vertigo or throat pain   NECK: No pain or stiffness  RESPIRATORY: No cough, wheezing, hemoptysis; No shortness of breath  CARDIOVASCULAR: No chest pain or palpitations  GASTROINTESTINAL: No abdominal or epigastric pain. No nausea, vomiting, or hematemesis; No diarrhea or constipation. No melena or hematochezia.  GENITOURINARY: No dysuria, frequency or hematuria  NEUROLOGICAL: No numbness or weakness  SKIN: No itching, no rashes  MSK: No pain    PHYSICAL EXAM:  NEURO: patient is awake , alert and oriented  GEN: Not in acute distress  NECK: no thyroid enlargement, no JVD  LUNGS: Clear to auscultation bilaterally   CARDIOVASCULAR: S1/S2 present, RRR , no murmurs or rubs, no carotid bruits,  + PP bilaterally  ABD: Soft, non-tender, non-distended, +BS  EXT: No KUMAR  SKIN: Intact    LABS:                        14.8   8.46  )-----------( 232      ( 16 Jul 2020 10:33 )             45.4     07-16    143  |  106  |  18  ----------------------------<  101<H>  4.7   |  26  |  1.1    Ca    9.2      16 Jul 2020 10:33  Mg     2.0     07-16    TPro  7.3  /  Alb  4.3  /  TBili  0.3  /  DBili  x   /  AST  22  /  ALT  24  /  AlkPhos  98  07-16    PT/INR - ( 16 Jul 2020 10:33 )   PT: 12.20 sec;   INR: 1.06 ratio           Troponin T, Serum: <0.01 ng/mL (07-16-20 @ 10:33)    CARDIAC MARKERS ( 16 Jul 2020 10:33 )  x     / <0.01 ng/mL / x     / x     / x            Troponin trend:    Serum Pro-Brain Natriuretic Peptide: 1034 pg/mL (07-16-20 @ 10:44)      RADIOLOGY:  -CXR:   XR CHEST PORTABLE IMMED 1V            PROCEDURE DATE:  07/16/2020        Impression:    No radiographic evidence of acute cardiopulmonary disease.    Support devices as described.    -TTE:  Pending     -CCTA:  None    -STRESS TEST: None    -CATHETERIZATION: None       ECG:       Diagnosis Line Sinus bradycardia with marked sinus arrhythmia with 1st degree A-V block  Otherwise normal ECG    TELEMETRY EVENTS:  Paced Rhythm

## 2020-07-16 NOTE — PATIENT PROFILE ADULT - PRO INTERPRETER NEED 2
Per staff, patient called and asked to withdraw her message. She received the answer she needed and no further follow-up call is necessary.   English

## 2020-07-16 NOTE — CONSULT NOTE ADULT - ATTENDING COMMENTS
Patient seen and examined at bedside. Discussed with the cardiology fellow.  Discussed with cardiology team on rounds. D/w CCU team.  Agree with findings as documented.    Advanced AV block. S/p TVP placement.    F/u TSH, T4, f/u lyme titers  2d echo  ischemic w/u - obtain nuclear adenosine stress test (TVP is in place)  CXR    If no reversible cause identified - will need a DDD pacemaker placement.  Obtain EP evaluation.

## 2020-07-16 NOTE — CONSULT NOTE ADULT - SUBJECTIVE AND OBJECTIVE BOX
HPI:  50 year old M with PMHx of cocaine-induced neutropenia, substance abuse (cocaine), presented to ED for 3 days of dyspnea and chest heaviness.  Patient states that for the last three days he has been feeling dizzy and light headed when he ambulates. He  has also been feeling short of breath for the past 6 months which he attributes to bronchitis as he is an active smoker.  He stated that he felt more dizzy when he was ambulating, worse when he went up the stairs.  He also states that for the past 3 days he had chest heaviness that radiated to his back, moderate in intensity with no alleviating or aggrevating factors. Patient went to urgent care to get an inhaler for his shortness of breath and they sent him to the ED for abnormal EKG. Patient found to be second degree heart block on EKG on presentation. TVP was placed with improvement of symptoms. He states that he drinks 3 red bulls every morning with one cup of coffee to get energy. He denies any exposure to the outdoors, tick bites, denies diarrhea, constipation, hot or cold intolerance, fever or chills.     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  No significant past surgical history      FAMILY HISTORY:  FAMILY HISTORY:  FH: diabetes mellitus      SOCIAL HISTORY:  []smoker  []Alcohol  []Drug    ALLERGIES:  No Known Allergies      MEDICATIONS:  MEDICATIONS  (STANDING):    MEDICATIONS  (PRN):      HOME MEDICATIONS:  Home Medications:  CeleBREX 200 mg oral capsule: 1 cap(s) orally 2 times a day (16 Jul 2020 12:31)      VITALS:   T(F): --  HR: 80 (07-16 @ 11:24) (54 - 80)  BP: 118/70 (07-16 @ 11:24) (114/90 - 118/70)  BP(mean): --  RR: 18 (07-16 @ 11:24) (17 - 18)  SpO2: 100% (07-16 @ 11:24) (98% - 100%)    I&O's Summary      REVIEW OF SYSTEMS:  CONSTITUTIONAL: No weakness, fevers or chills  EYES: No visual changes  ENT: No vertigo or throat pain   NECK: No pain or stiffness  RESPIRATORY: No cough, wheezing, hemoptysis; No shortness of breath  CARDIOVASCULAR: No chest pain or palpitations  GASTROINTESTINAL: No abdominal or epigastric pain. No nausea, vomiting, or hematemesis; No diarrhea or constipation. No melena or hematochezia.  GENITOURINARY: No dysuria, frequency or hematuria  NEUROLOGICAL: No numbness or weakness  SKIN: No itching, no rashes  MSK: No pain    PHYSICAL EXAM:  NEURO: patient is awake , alert and oriented  GEN: Not in acute distress  NECK: no thyroid enlargement, no JVD  LUNGS: Clear to auscultation bilaterally   CARDIOVASCULAR: S1/S2 present, RRR , no murmurs or rubs, no carotid bruits,  + PP bilaterally  ABD: Soft, non-tender, non-distended, +BS  EXT: No KUMAR  SKIN: TVP Rt    LABS:                        14.8   8.46  )-----------( 232      ( 16 Jul 2020 10:33 )             45.4     07-16    143  |  106  |  18  ----------------------------<  101<H>  4.7   |  26  |  1.1    Ca    9.2      16 Jul 2020 10:33  Mg     2.0     07-16    TPro  7.3  /  Alb  4.3  /  TBili  0.3  /  DBili  x   /  AST  22  /  ALT  24  /  AlkPhos  98  07-16    PT/INR - ( 16 Jul 2020 10:33 )   PT: 12.20 sec;   INR: 1.06 ratio           Troponin T, Serum: <0.01 ng/mL (07-16-20 @ 10:33)    CARDIAC MARKERS ( 16 Jul 2020 10:33 )  x     / <0.01 ng/mL / x     / x     / x            Troponin trend:    Serum Pro-Brain Natriuretic Peptide: 1034 pg/mL (07-16-20 @ 10:44)          RADIOLOGY:  -CXR:    EXAM:  XR CHEST PORTABLE IMMED 1V            PROCEDURE DATE:  07/16/2020      Impression:    No radiographic evidence of acute cardiopulmonary disease.    Support devices as described    -TTE: Pending     -CCTA: None         -STRESS TEST: None    -CATHETERIZATION: None      ECG:      Diagnosis Line Sinus bradycardia with marked sinus arrhythmia with 1st degree A-V block  Otherwise normal ECG      TELEMETRY EVENTS:  Paced rhythm HPI:  50 year old M with PMHx of cocaine-induced neutropenia, substance abuse (cocaine), presented to ED for 3 days of dyspnea and chest heaviness.  Patient states that for the last three days he has been feeling dizzy and light headed when he ambulates. He  has also been feeling short of breath for the past 6 months which he attributes to bronchitis as he is an active smoker.  He stated that he felt more dizzy when he was ambulating, worse when he went up the stairs.  He also states that for the past 3 days he had chest heaviness that radiated to his back, moderate in intensity with no alleviating or aggrevating factors. Patient went to urgent care to get an inhaler for his shortness of breath and they sent him to the ED for abnormal EKG. Patient found to be second degree heart block on EKG on presentation. TVP was placed with improvement of symptoms. He states that he drinks 3 red bulls every morning with one cup of coffee to get energy. He denies any exposure to the outdoors, tick bites, denies diarrhea, constipation, hot or cold intolerance, fever or chills.     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  No significant past surgical history      FAMILY HISTORY:  FAMILY HISTORY: No FH of premature CAD   FH: diabetes mellitus      SOCIAL HISTORY:  [x]Current smoker  []No current Alcohol  []Hx of Cocaine use Drug > 1 year ago     ALLERGIES:  No Known Allergies      MEDICATIONS:  MEDICATIONS  (STANDING):    MEDICATIONS  (PRN):      HOME MEDICATIONS:  Home Medications:  CeleBREX 200 mg oral capsule: 1 cap(s) orally 2 times a day (16 Jul 2020 12:31)      VITALS:   T(F): --  HR: 80 (07-16 @ 11:24) (54 - 80)  BP: 118/70 (07-16 @ 11:24) (114/90 - 118/70)  BP(mean): --  RR: 18 (07-16 @ 11:24) (17 - 18)  SpO2: 100% (07-16 @ 11:24) (98% - 100%)    I&O's Summary      REVIEW OF SYSTEMS:  CONSTITUTIONAL: No weakness, fevers or chills  EYES: No visual changes  ENT: No vertigo or throat pain   NECK: No pain or stiffness  RESPIRATORY: No cough, wheezing, hemoptysis; No shortness of breath  CARDIOVASCULAR: No chest pain or palpitations  GASTROINTESTINAL: No abdominal or epigastric pain. No nausea, vomiting, or hematemesis; No diarrhea or constipation. No melena or hematochezia.  GENITOURINARY: No dysuria, frequency or hematuria  NEUROLOGICAL: No numbness or weakness  SKIN: No itching, no rashes  MSK: No pain    PHYSICAL EXAM:  NEURO: patient is awake , alert and oriented  GEN: Not in acute distress  NECK: no thyroid enlargement, no JVD  LUNGS: Clear to auscultation bilaterally   CARDIOVASCULAR: S1/S2 present, RRR , no murmurs or rubs, no carotid bruits,  + PP bilaterally  ABD: Soft, non-tender, non-distended, +BS  EXT: No KUMAR  SKIN: TVP Rt    LABS:                        14.8   8.46  )-----------( 232      ( 16 Jul 2020 10:33 )             45.4     07-16    143  |  106  |  18  ----------------------------<  101<H>  4.7   |  26  |  1.1    Ca    9.2      16 Jul 2020 10:33  Mg     2.0     07-16    TPro  7.3  /  Alb  4.3  /  TBili  0.3  /  DBili  x   /  AST  22  /  ALT  24  /  AlkPhos  98  07-16    PT/INR - ( 16 Jul 2020 10:33 )   PT: 12.20 sec;   INR: 1.06 ratio           Troponin T, Serum: <0.01 ng/mL (07-16-20 @ 10:33)    CARDIAC MARKERS ( 16 Jul 2020 10:33 )  x     / <0.01 ng/mL / x     / x     / x            Troponin trend:    Serum Pro-Brain Natriuretic Peptide: 1034 pg/mL (07-16-20 @ 10:44)          RADIOLOGY:  -CXR:    EXAM:  XR CHEST PORTABLE IMMED 1V            PROCEDURE DATE:  07/16/2020      Impression:    No radiographic evidence of acute cardiopulmonary disease.    Support devices as described    -TTE: Pending     -CCTA: None         -STRESS TEST: None    -CATHETERIZATION: None      ECG:      Diagnosis Line Sinus bradycardia with marked sinus arrhythmia with 1st degree A-V block  Otherwise normal ECG      TELEMETRY EVENTS:  Paced rhythm HPI:  50 year old M with PMHx of cocaine-induced neutropenia, substance abuse (cocaine), presented to ED for 3 days of dyspnea and chest heaviness.  Patient states that for the last three days he has been feeling dizzy and light headed when he ambulates. He  has also been feeling short of breath for the past 6 months which he attributes to bronchitis as he is an active smoker.  He stated that he felt more dizzy when he was ambulating, worse when he went up the stairs.  He also states that for the past 3 days he had chest heaviness that radiated to his back, moderate in intensity with no alleviating or aggrevating factors. Patient went to urgent care to get an inhaler for his shortness of breath and they sent him to the ED for abnormal EKG. Patient found to be second degree heart block on EKG on presentation. TVP was placed with improvement of symptoms. He states that he drinks 3 red bulls every morning with one cup of coffee to get energy. He denies any exposure to the outdoors, tick bites, denies diarrhea, constipation, hot or cold intolerance, fever or chills.       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  No significant past surgical history      FAMILY HISTORY:  FAMILY HISTORY: No FH of premature CAD   FH: diabetes mellitus      SOCIAL HISTORY:  [x]Current smoker  []No current Alcohol  [x]Hx of Cocaine use Drug > 1 year ago     ALLERGIES:  No Known Allergies      MEDICATIONS:  MEDICATIONS  (STANDING):    MEDICATIONS  (PRN):      HOME MEDICATIONS:  Home Medications:  CeleBREX 200 mg oral capsule: 1 cap(s) orally 2 times a day (16 Jul 2020 12:31)      VITALS:   T(F): --  HR: 80 (07-16 @ 11:24) (54 - 80)  BP: 118/70 (07-16 @ 11:24) (114/90 - 118/70)  BP(mean): --  RR: 18 (07-16 @ 11:24) (17 - 18)  SpO2: 100% (07-16 @ 11:24) (98% - 100%)    I&O's Summary      REVIEW OF SYSTEMS:  CONSTITUTIONAL: No weakness, fevers or chills  EYES: No visual changes  ENT: No vertigo or throat pain   NECK: No pain or stiffness  RESPIRATORY: No cough, wheezing, hemoptysis; No shortness of breath  CARDIOVASCULAR: No chest pain or palpitations  GASTROINTESTINAL: No abdominal or epigastric pain. No nausea, vomiting, or hematemesis; No diarrhea or constipation. No melena or hematochezia.  GENITOURINARY: No dysuria, frequency or hematuria  NEUROLOGICAL: No numbness or weakness  SKIN: No itching, no rashes  MSK: No pain    PHYSICAL EXAM:  NEURO: patient is awake , alert and oriented  Psych: appropriate  GEN: Not in acute distress  NECK: no thyroid enlargement, no JVD  LUNGS: Clear to auscultation bilaterally   CARDIOVASCULAR: S1/S2 present, RRR , no murmurs or rubs, no carotid bruits,  + PP bilaterally  ABD: Soft, non-tender, non-distended, +BS  EXT/MSK: No KUMAR  SKIN: TVP Rt    LABS:                        14.8   8.46  )-----------( 232      ( 16 Jul 2020 10:33 )             45.4     07-16    143  |  106  |  18  ----------------------------<  101<H>  4.7   |  26  |  1.1    Ca    9.2      16 Jul 2020 10:33  Mg     2.0     07-16    TPro  7.3  /  Alb  4.3  /  TBili  0.3  /  DBili  x   /  AST  22  /  ALT  24  /  AlkPhos  98  07-16    PT/INR - ( 16 Jul 2020 10:33 )   PT: 12.20 sec;   INR: 1.06 ratio           Troponin T, Serum: <0.01 ng/mL (07-16-20 @ 10:33)    CARDIAC MARKERS ( 16 Jul 2020 10:33 )  x     / <0.01 ng/mL / x     / x     / x            Troponin trend:    Serum Pro-Brain Natriuretic Peptide: 1034 pg/mL (07-16-20 @ 10:44)          RADIOLOGY:  -CXR:    EXAM:  XR CHEST PORTABLE IMMED 1V            PROCEDURE DATE:  07/16/2020      Impression:    No radiographic evidence of acute cardiopulmonary disease.    Support devices as described    -TTE: Pending     -CCTA: None         -STRESS TEST: None    -CATHETERIZATION: None      ECG:      Diagnosis Line Sinus bradycardia with marked sinus arrhythmia with 1st degree A-V block  Otherwise normal ECG      TELEMETRY EVENTS:  Paced rhythm

## 2020-07-16 NOTE — CONSULT NOTE ADULT - ASSESSMENT
50 year old M with PMHx of cocaine-induced neutropenia, substance abuse (cocaine), presented to ED for 3 days of dyspnea and chest heaviness.     IMPRESSION:    2nd degree AV block, Mobitz II, Symptomatic     RECOMMENDATIONS:    Rule out reversible causes, Obtain TSH, Lyme Titers, Troponin, Utox   TTE  May need Ischemic workup if infectious etiology not found   If no ischemic cause found may need to rule infiltrative disease   EP consult for PPM 50 year old M with PMHx of cocaine-induced neutropenia, substance abuse (cocaine), presented to ED for 3 days of dyspnea and chest heaviness.     IMPRESSION:    2nd degree AV block, Mobitz II, Symptomatic     RECOMMENDATIONS:    Rule out reversible causes, Obtain TSH, Lyme Titers, Troponin, Utox   TTE  May need Ischemic workup if infectious etiology, thyroid disease not found   If no ischemic cause found may need to rule infiltrative disease   EP consult for PPM

## 2020-07-16 NOTE — ED PROVIDER NOTE - ATTENDING CONTRIBUTION TO CARE
49 yo M presented to ED for 3 days of SOB and chest heaviness. No palpitations, nausea, vomiting, fever, chills, cough. Patient went to urgent care and sent him to the ED for abnormal EKG.    Pts EKG demonstrated third degree heart block.     On PE patient in NAD  Cardio: bradycardic, no murmurs   Lung: CTA  Abdomen SNTND       Will do labs, CXR, EKG, transvenous pacemaker

## 2020-07-16 NOTE — ED ADULT NURSE NOTE - OBJECTIVE STATEMENT
patient complaining of SOB,  bradycardia, and chest discomfort for 3 days. patient BIBA, A&O x3, VSS, O2 sat 98% on room air. denies n/v/d, fever, chills. no signs of distress at this time.

## 2020-07-16 NOTE — ED PROVIDER NOTE - PHYSICAL EXAMINATION
VITAL SIGNS: I have reviewed nursing notes and confirm.  CONSTITUTIONAL: well-appearing, non-toxic  SKIN: Warm dry, normal skin turgor  HEAD: NCAT  EYES: EOMI, PERRLA, no scleral icterus  ENT: Moist mucous membranes, normal pharynx   NECK: Supple; non tender. Full ROM.   CARD: Bradycardia, no murmurs, rubs  RESP: clear to ausculation b/l.  No rales, rhonchi, or wheezing.  ABD: soft, + BS, non-tender, non-distended, no rebound or guarding.   EXT: Full ROM, no bony tenderness, no pedal edema, no calf tenderness  NEURO: SILVA x 4  PSYCH: Cooperative, appropriate.

## 2020-07-16 NOTE — ED PROVIDER NOTE - NS ED ROS FT
Constitutional: See HPI.  Eyes: No visual changes, eye pain or discharge. No Photophobia  ENMT: No hearing changes, pain, discharge or infections. No neck pain or stiffness. No limited ROM  Cardiac: see hpi   Respiratory: No cough or respiratory distress.   GI: No nausea, vomiting, diarrhea or abdominal pain.  : No dysuria, frequency or burning. No Discharge  MS: No myalgia, muscle weakness, joint pain or back pain.  Neuro: No headache or weakness. No LOC.  Skin: No skin rash.  Except as documented in the HPI, all other systems are negative.

## 2020-07-16 NOTE — H&P ADULT - HISTORY OF PRESENT ILLNESS
49 yo M presented to ED for 3 days of SOB and chest heaviness.  Patient states that for the last three days he has been feeling dizzy and light headed when he ambulates. Patient went to urgent care and sent him to the ED for abnormal EKG. Patient found to be in third degree heart block. TVP was placed with improvement of symptoms. He states that he drinks 3 red bulls every morning with one cup of coffee to get energy. He denies any exposure to the outdoors, tick bites, denies diarrhea, constipation, hot or cold intolerance. 50 year old M with PMHx of cocaine-induced neutropenia, substance abuse (cocaine), presented to ED for 3 days of dyspnea and chest heaviness.  Patient states that for the last three days he has been feeling dizzy and light headed when he ambulates. Patient went to urgent care and sent him to the ED for abnormal EKG. Patient found to be in third degree heart block. TVP was placed with improvement of symptoms. He states that he drinks 3 red bulls every morning with one cup of coffee to get energy. He denies any exposure to the outdoors, tick bites, denies diarrhea, constipation, hot or cold intolerance. 50 year old M with PMHx of cocaine-induced neutropenia, substance abuse (cocaine), presented to ED for 3 days of dyspnea and chest heaviness.  Patient states that for the last three days he has been feeling dizzy and light headed when he ambulates. He  has also been feeling short of breath for the past 6 months which he attributes to bronchitis as he is an active smoker.  He stated that he felt more dizzy when he was ambulating, worse when he went up the stairs.  He also states that for the past 3 days he had chest heaviness that radiated to his back, moderate in intensity with no alleviating or aggrevating factors. Patient went to urgent care to get an inhaler for his shortness of breath and they sent him to the ED for abnormal EKG. Patient found to be second degree heart block on EKG on presentation. TVP was placed with improvement of symptoms. He states that he drinks 3 red bulls every morning with one cup of coffee to get energy. He denies any exposure to the outdoors, tick bites, denies diarrhea, constipation, hot or cold intolerance, fever or chills.     T(C):   HR: 80 (07-16-20 @ 11:24) (54 - 80)  BP: 118/70 (07-16-20 @ 11:24) (114/90 - 118/70)  RR: 18 (07-16-20 @ 11:24) (17 - 18)  SpO2: 100% (07-16-20 @ 11:24) (98% - 100%)

## 2020-07-16 NOTE — ED PROCEDURE NOTE - NS ED PROCEDURE ASSISTED BY
The procedure was performed independently
Supervision was available
Assistance was available/Supervision was available

## 2020-07-16 NOTE — H&P ADULT - NSICDXPASTMEDICALHX_GEN_ALL_CORE_FT
PAST MEDICAL HISTORY:  Alcoholism clean 1 yr    History of bunionectomy     Illicit drug use pt denies    Neutropenia pt denies

## 2020-07-16 NOTE — PROCEDURAL SAFETY CHECKLIST WITH OR WITHOUT SEDATION - NSPOSTCOMMENTFT_GEN_ALL_CORE
Right sided transcutaneous pacemaker placed. pacing at rate 80, patient tolerated procedure well.   no signs of distress symptoms.

## 2020-07-16 NOTE — H&P ADULT - ASSESSMENT
50 year old M with PMHx of cocaine-induced neutropenia, substance abuse (cocaine), presented to ED for 3 days of dyspnea and chest heaviness.      #) Worsening Dyspnea, chest heaviness  -Likely due to 2nd degree heart block, Mobitz II  -s/p TVP in ED  -Obtain Lyme serology, TSH  -Keep Mg >2, K >4.0  -Cardiology consult for ICM, EP consult for PPM pacemaker     #) Hx of cocaine-induced neutropenia  -outpatient heme follow up     #) Hx of cocaine abuse  -will send Utox    DVT ppx: Heparin     GI ppx: Not indicated    Diet: DASH    Activity: Increase as tolerated    CHG WASH    Dispo:  May need PPM 50 year old M with PMHx of cocaine-induced neutropenia, substance abuse (cocaine), presented to ED for 3 days of dyspnea and chest heaviness.      #) Worsening Dyspnea, chest heaviness  -Likely due to 2nd degree heart block, Mobitz II  -s/p TVP in ED  -Obtain Lyme serology, TSH  -Keep Mg >2, K >4.0  -Cardiology consult for ICM, EP consult for PPM pacemaker     #) Hx of cocaine-induced neutropenia - now resolved  -outpatient heme follow up     #) Hx of cocaine abuse  - pt currently denies drug abuse  -will send Utox    DVT ppx: Heparin     GI ppx: Not indicated    Diet: DASH    Activity: Increase as tolerated    CHG WASH    Dispo:  May need PPM

## 2020-07-16 NOTE — ED ADULT NURSE NOTE - NSIMPLEMENTINTERV_GEN_ALL_ED
Implemented All Universal Safety Interventions:  [place] to call system. Call bell, personal items and telephone within reach. Instruct patient to call for assistance. Room bathroom lighting operational. Non-slip footwear when patient is off stretcher. Physically safe environment: no spills, clutter or unnecessary equipment. Stretcher in lowest position, wheels locked, appropriate side rails in place.

## 2020-07-16 NOTE — ED PROVIDER NOTE - CLINICAL SUMMARY MEDICAL DECISION MAKING FREE TEXT BOX
49 yo M presented to ED for SOB and CP. Patient found to be in third degree heart block. TVP placed with improvement of symptoms. Etiology unclear. Lyme titers sent.   patient placed in CCU for further management.

## 2020-07-16 NOTE — CONSULT NOTE ADULT - ASSESSMENT
50 year old M with PMHx of cocaine-induced neutropenia, substance abuse (cocaine), presented to ED for 3 days of dyspnea and chest heaviness.    IMPRESSION:     2nd degree AV block, Mobitz II    RECOMMENDATIONS:    Rule out reversible causes, Obtain TSH, Lyme Titers, Troponin, Utox   Obtain TTE   Cardiology for Ischemic workup if infectious etiology not found   May need Cardiac MRI to rule infiltrative disease   PPM if cause not found 50 year old M with PMHx of cocaine-induced neutropenia, substance abuse (cocaine), presented to ED for 3 days of dyspnea and chest heaviness.    IMPRESSION:     2nd degree AV block, Mobitz II    RECOMMENDATIONS:    Rule out reversible causes, Obtain TSH, Lyme Titers, Troponin, Utox   Obtain TTE   Cardiology for Ischemic workup if infectious etiology not found   May need Cardiac MRI to rule infiltrative disease   PPM if no reversible cause found     ATTENDING RECS TO FOLLOW 50 year old M with PMHx of cocaine-induced neutropenia, substance abuse (cocaine), presented to ED for 3 days of dyspnea and chest heaviness.    IMPRESSION:   CH B  Most likely AV Augustine type intermittent  A V  conduction with prolonged  IL    RECOMMENDATIONS:  Rule out reversible causes, Obtain TSH, Lyme Titers, Troponin, Utox   Givve  2 gms of Rocafin as dianostic therapy for  Lymes  Obtain TTE   Cardiology for Ischemic workup if infectious etiology not found   PPM if no reversible cause found

## 2020-07-16 NOTE — PROCEDURAL SAFETY CHECKLIST WITH OR WITHOUT SEDATION - NSPREPROCSEDMD_GEN_ALL_CORE
Problem: Discharge Barriers/Planning  Goal: Patient's continuum of care needs will be met    Intervention: Assess potential discharge barriers on admission and throughout hospital stay  PT to DC home when IV abx complete       Problem: Pain Management  Goal: Pain level will decrease to patient's comfort goal    Intervention: Follow pain managment plan developed in collaboration with patient and Interdisciplinary Team  Abdominal pain managed with prn PO Dilaudid          Telephone Encounter by Cher Dyson at 04/20/18 09:32 AM     Author:  Cher Dyson Service:  (none) Author Type:       Filed:  04/20/18 09:37 AM Encounter Date:  4/20/2018 Status:  Signed     :  Cher Dyson ()              TODD TOTH    Patient Age: 71 year old    ACCT STATUS:   MESSAGE:[SG1.1T]pt wants  to know her left ankle was very swollen so she went off Prednisone a couple days ago as she could not bend her toes. Since stopping Prednisone swelling is going down.  She asks \"Did I do the right thing going off Prednisone\"?   Also asking for information on portable oxygen machine.  She would like to order one.[SG1.1M]   Message confirmed with caller.   Next and Last Visit with Provider and Department  Next visit with GABRIELLA ZAMORANO is on 06/27/2018 at 11:20 AM in [facility]  Next visit with PULMONARY MEDICINE is on 06/27/2018 at 11:20 AM in [facility]  Last visit with GABRIELLA ZAMORANO was on 03/23/2018 at 11:20 AM in [facility]  Last visit with PULMONARY MEDICINE was on 03/23/2018 at 11:20 AM in [facility]     WEIGHT AND HEIGHT: As of 03/23/2018 weight is 140 lbs.(63.504 kg). Height is 5' 1\"(1.549 m).   BMI is 26.47 kg/(m^2) calculated from:     Height 5' 1\" (1.549 m) as of 3/23/18     Weight 140 lb (63.504 kg) as of 3/23/18      Allergies      Allergen   Reactions   • Aspirin       Pt states it causes GI upset      Current outpatient prescriptions       Medication  Sig Dispense Refill   • predniSONE (DELTASONE) 5 MG tablet Take 1 Tab by mouth daily for 30 days. 30 Tab 1   • predniSONE (DELTASONE) 10 MG tablet TAKE 1 TABLET BY MOUTH EVERY DAY FOR 10 DAYS 30 Tab 0   • ADVAIR DISKUS 250-50 MCG/DOSE AEPB INHALE 1 PUFF BY MOUTH EVERY 12 (TWELVE) HOURS. 1 Inhaler 11   • COMBIVENT RESPIMAT  MCG/ACT AERS INHALE 1 PUFF BY MOUTH 4 (FOUR) TIMES DAILY. 1 Inhaler 11   • predniSONE (DELTASONE) 10 MG tablet Take 10 mg by  mouth daily.     • alprazolam (XANAX) 0.25 MG tablet Take 1 Tab by mouth 3 (three) times daily as needed for Anxiety. 90 Tab 1   • albuterol (PROVENTIL) (2.5 MG/3ML) 0.083% nebulizer solution INHALE ONE VIAL FOUR TIMES DAILY AS NEEDED 1050 mL 11   • escitalopram (LEXAPRO) 10 MG tablet Take 1 Tab by mouth daily. 90 Tab 3   • CUSTOM FORMULATION -- SEE SIG Nebulizer machine. Dx copd 1 Each 0      PHARMACY to use:[SG1.1T] n/a[SG1.1M]          Pharmacy preference(s) on file:    [facility]  [email or web address]  [id number] [address]  [facility] [address]    CALL BACK INFO:[SG1.1T] Ok to leave response (including medical information) with family member or on answering machine[SG1.1M]  ROUTING:[SG1.1T] Patient's physician/staff[SG1.1M]        PCP: Kaiser Mahoney MD         INS: Payor: MEDICARE - ASSIGNED / Plan: *No Plan* / Product Type: *No Product type* / Note: This is the primary coverage, but no account was found for this location or the patient's primary location.   ADDRESS:  [address] [place] [address] Dr  [place] [address][SG1.1T]       Revision History        User Key Date/Time User Provider Type Action    > SG1.1 04/20/18 09:37 AM Cher Dyson  Sign    M - Manual, T - Template             done

## 2020-07-17 LAB
A1C WITH ESTIMATED AVERAGE GLUCOSE RESULT: 6.2 % — HIGH (ref 4–5.6)
ALBUMIN SERPL ELPH-MCNC: 3.7 G/DL — SIGNIFICANT CHANGE UP (ref 3.5–5.2)
ALP SERPL-CCNC: 86 U/L — SIGNIFICANT CHANGE UP (ref 30–115)
ALT FLD-CCNC: 18 U/L — SIGNIFICANT CHANGE UP (ref 0–41)
AMPHET UR-MCNC: NEGATIVE — SIGNIFICANT CHANGE UP
ANION GAP SERPL CALC-SCNC: 8 MMOL/L — SIGNIFICANT CHANGE UP (ref 7–14)
AST SERPL-CCNC: 21 U/L — SIGNIFICANT CHANGE UP (ref 0–41)
B BURGDOR C6 AB SER-ACNC: NEGATIVE — SIGNIFICANT CHANGE UP
B BURGDOR IGG+IGM SER-ACNC: 0.14 INDEX — SIGNIFICANT CHANGE UP (ref 0.01–0.89)
BARBITURATES UR SCN-MCNC: NEGATIVE — SIGNIFICANT CHANGE UP
BASOPHILS # BLD AUTO: 0.09 K/UL — SIGNIFICANT CHANGE UP (ref 0–0.2)
BASOPHILS NFR BLD AUTO: 1.4 % — HIGH (ref 0–1)
BENZODIAZ UR-MCNC: NEGATIVE — SIGNIFICANT CHANGE UP
BILIRUB SERPL-MCNC: 0.6 MG/DL — SIGNIFICANT CHANGE UP (ref 0.2–1.2)
BUN SERPL-MCNC: 15 MG/DL — SIGNIFICANT CHANGE UP (ref 10–20)
CALCIUM SERPL-MCNC: 8.5 MG/DL — SIGNIFICANT CHANGE UP (ref 8.5–10.1)
CHLORIDE SERPL-SCNC: 107 MMOL/L — SIGNIFICANT CHANGE UP (ref 98–110)
CHOLEST SERPL-MCNC: 168 MG/DL — SIGNIFICANT CHANGE UP (ref 100–200)
CO2 SERPL-SCNC: 23 MMOL/L — SIGNIFICANT CHANGE UP (ref 17–32)
COCAINE METAB.OTHER UR-MCNC: NEGATIVE — SIGNIFICANT CHANGE UP
CREAT SERPL-MCNC: 0.8 MG/DL — SIGNIFICANT CHANGE UP (ref 0.7–1.5)
EOSINOPHIL # BLD AUTO: 0.19 K/UL — SIGNIFICANT CHANGE UP (ref 0–0.7)
EOSINOPHIL NFR BLD AUTO: 3 % — SIGNIFICANT CHANGE UP (ref 0–8)
ESTIMATED AVERAGE GLUCOSE: 131 MG/DL — HIGH (ref 68–114)
GLUCOSE SERPL-MCNC: 102 MG/DL — HIGH (ref 70–99)
HCT VFR BLD CALC: 41.9 % — LOW (ref 42–52)
HDLC SERPL-MCNC: 25 MG/DL — LOW
HGB BLD-MCNC: 13.7 G/DL — LOW (ref 14–18)
IMM GRANULOCYTES NFR BLD AUTO: 0.3 % — SIGNIFICANT CHANGE UP (ref 0.1–0.3)
LIPID PNL WITH DIRECT LDL SERPL: 121 MG/DL — SIGNIFICANT CHANGE UP (ref 4–129)
LYMPHOCYTES # BLD AUTO: 1.26 K/UL — SIGNIFICANT CHANGE UP (ref 1.2–3.4)
LYMPHOCYTES # BLD AUTO: 20 % — LOW (ref 20.5–51.1)
MAGNESIUM SERPL-MCNC: 1.9 MG/DL — SIGNIFICANT CHANGE UP (ref 1.8–2.4)
MCHC RBC-ENTMCNC: 29.3 PG — SIGNIFICANT CHANGE UP (ref 27–31)
MCHC RBC-ENTMCNC: 32.7 G/DL — SIGNIFICANT CHANGE UP (ref 32–37)
MCV RBC AUTO: 89.5 FL — SIGNIFICANT CHANGE UP (ref 80–94)
METHADONE UR-MCNC: NEGATIVE — SIGNIFICANT CHANGE UP
MONOCYTES # BLD AUTO: 0.58 K/UL — SIGNIFICANT CHANGE UP (ref 0.1–0.6)
MONOCYTES NFR BLD AUTO: 9.2 % — SIGNIFICANT CHANGE UP (ref 1.7–9.3)
NEUTROPHILS # BLD AUTO: 4.15 K/UL — SIGNIFICANT CHANGE UP (ref 1.4–6.5)
NEUTROPHILS NFR BLD AUTO: 66.1 % — SIGNIFICANT CHANGE UP (ref 42.2–75.2)
NRBC # BLD: 0 /100 WBCS — SIGNIFICANT CHANGE UP (ref 0–0)
OPIATES UR-MCNC: NEGATIVE — SIGNIFICANT CHANGE UP
PCP SPEC-MCNC: SIGNIFICANT CHANGE UP
PHOSPHATE SERPL-MCNC: 2.9 MG/DL — SIGNIFICANT CHANGE UP (ref 2.1–4.9)
PLATELET # BLD AUTO: 193 K/UL — SIGNIFICANT CHANGE UP (ref 130–400)
POTASSIUM SERPL-MCNC: 4.6 MMOL/L — SIGNIFICANT CHANGE UP (ref 3.5–5)
POTASSIUM SERPL-SCNC: 4.6 MMOL/L — SIGNIFICANT CHANGE UP (ref 3.5–5)
PROPOXYPHENE QUALITATIVE URINE RESULT: NEGATIVE — SIGNIFICANT CHANGE UP
PROT SERPL-MCNC: 6.2 G/DL — SIGNIFICANT CHANGE UP (ref 6–8)
RBC # BLD: 4.68 M/UL — LOW (ref 4.7–6.1)
RBC # FLD: 12.8 % — SIGNIFICANT CHANGE UP (ref 11.5–14.5)
SARS-COV-2 RNA SPEC QL NAA+PROBE: SIGNIFICANT CHANGE UP
SODIUM SERPL-SCNC: 138 MMOL/L — SIGNIFICANT CHANGE UP (ref 135–146)
T3FREE SERPL-MCNC: 2.85 PG/ML — SIGNIFICANT CHANGE UP (ref 1.8–4.6)
T4 FREE SERPL-MCNC: 1.2 NG/DL — SIGNIFICANT CHANGE UP (ref 0.9–1.8)
TOTAL CHOLESTEROL/HDL RATIO MEASUREMENT: 6.7 RATIO — HIGH (ref 4–5.5)
TRIGL SERPL-MCNC: 131 MG/DL — SIGNIFICANT CHANGE UP (ref 10–149)
TROPONIN T SERPL-MCNC: <0.01 NG/ML — SIGNIFICANT CHANGE UP
TSH SERPL-MCNC: 0.7 UIU/ML — SIGNIFICANT CHANGE UP (ref 0.27–4.2)
WBC # BLD: 6.29 K/UL — SIGNIFICANT CHANGE UP (ref 4.8–10.8)
WBC # FLD AUTO: 6.29 K/UL — SIGNIFICANT CHANGE UP (ref 4.8–10.8)

## 2020-07-17 PROCEDURE — 71045 X-RAY EXAM CHEST 1 VIEW: CPT | Mod: 26,77

## 2020-07-17 PROCEDURE — 71045 X-RAY EXAM CHEST 1 VIEW: CPT | Mod: 26

## 2020-07-17 PROCEDURE — 78452 HT MUSCLE IMAGE SPECT MULT: CPT | Mod: 26

## 2020-07-17 PROCEDURE — 99223 1ST HOSP IP/OBS HIGH 75: CPT

## 2020-07-17 PROCEDURE — 93010 ELECTROCARDIOGRAM REPORT: CPT

## 2020-07-17 PROCEDURE — 99232 SBSQ HOSP IP/OBS MODERATE 35: CPT

## 2020-07-17 PROCEDURE — 93306 TTE W/DOPPLER COMPLETE: CPT | Mod: 26

## 2020-07-17 PROCEDURE — 93016 CV STRESS TEST SUPVJ ONLY: CPT

## 2020-07-17 PROCEDURE — 93018 CV STRESS TEST I&R ONLY: CPT

## 2020-07-17 RX ORDER — HEPARIN SODIUM 5000 [USP'U]/ML
5000 INJECTION INTRAVENOUS; SUBCUTANEOUS EVERY 8 HOURS
Refills: 0 | Status: DISCONTINUED | OUTPATIENT
Start: 2020-07-17 | End: 2020-07-20

## 2020-07-17 RX ORDER — CEFTRIAXONE 500 MG/1
2000 INJECTION, POWDER, FOR SOLUTION INTRAMUSCULAR; INTRAVENOUS EVERY 24 HOURS
Refills: 0 | Status: DISCONTINUED | OUTPATIENT
Start: 2020-07-17 | End: 2020-07-17

## 2020-07-17 RX ORDER — CHLORHEXIDINE GLUCONATE 213 G/1000ML
1 SOLUTION TOPICAL
Refills: 0 | Status: DISCONTINUED | OUTPATIENT
Start: 2020-07-17 | End: 2020-07-20

## 2020-07-17 RX ORDER — ACETAMINOPHEN 500 MG
650 TABLET ORAL EVERY 6 HOURS
Refills: 0 | Status: DISCONTINUED | OUTPATIENT
Start: 2020-07-17 | End: 2020-07-20

## 2020-07-17 RX ORDER — ACETAMINOPHEN 500 MG
650 TABLET ORAL ONCE
Refills: 0 | Status: COMPLETED | OUTPATIENT
Start: 2020-07-17 | End: 2020-07-17

## 2020-07-17 RX ORDER — LANOLIN ALCOHOL/MO/W.PET/CERES
5 CREAM (GRAM) TOPICAL AT BEDTIME
Refills: 0 | Status: DISCONTINUED | OUTPATIENT
Start: 2020-07-17 | End: 2020-07-20

## 2020-07-17 RX ADMIN — CEFTRIAXONE 100 MILLIGRAM(S): 500 INJECTION, POWDER, FOR SOLUTION INTRAMUSCULAR; INTRAVENOUS at 09:26

## 2020-07-17 RX ADMIN — CHLORHEXIDINE GLUCONATE 1 APPLICATION(S): 213 SOLUTION TOPICAL at 05:04

## 2020-07-17 RX ADMIN — Medication 650 MILLIGRAM(S): at 10:48

## 2020-07-17 RX ADMIN — Medication 650 MILLIGRAM(S): at 22:56

## 2020-07-17 RX ADMIN — Medication 650 MILLIGRAM(S): at 22:09

## 2020-07-17 RX ADMIN — ADENOSINE 600 MILLIGRAM(S): 3 INJECTION INTRAVENOUS at 15:43

## 2020-07-17 RX ADMIN — Medication 650 MILLIGRAM(S): at 17:32

## 2020-07-17 RX ADMIN — HEPARIN SODIUM 5000 UNIT(S): 5000 INJECTION INTRAVENOUS; SUBCUTANEOUS at 21:00

## 2020-07-17 RX ADMIN — Medication 650 MILLIGRAM(S): at 22:55

## 2020-07-17 RX ADMIN — Medication 5 MILLIGRAM(S): at 22:08

## 2020-07-17 RX ADMIN — ALBUTEROL 2 PUFF(S): 90 AEROSOL, METERED ORAL at 20:28

## 2020-07-17 RX ADMIN — Medication 650 MILLIGRAM(S): at 10:50

## 2020-07-17 NOTE — PROGRESS NOTE ADULT - SUBJECTIVE AND OBJECTIVE BOX
Patient is a 50y old  Male who presents with a chief complaint of 2nd degree heart block (16 Jul 2020 13:32)    HPI:  50 year old M with PMHx of cocaine-induced neutropenia, substance abuse (cocaine), presented to ED for 3 days of dyspnea and chest heaviness.  Patient states that for the last three days he has been feeling dizzy and light headed when he ambulates. He  has also been feeling short of breath for the past 6 months which he attributes to bronchitis as he is an active smoker.  He stated that he felt more dizzy when he was ambulating, worse when he went up the stairs.  He also states that for the past 3 days he had chest heaviness that radiated to his back, moderate in intensity with no alleviating or aggrevating factors. Patient went to urgent care to get an inhaler for his shortness of breath and they sent him to the ED for abnormal EKG. Patient found to be second degree heart block on EKG on presentation. TVP was placed with improvement of symptoms. He states that he drinks 3 red bulls every morning with one cup of coffee to get energy. He denies any exposure to the outdoors, tick bites, denies diarrhea, constipation, hot or cold intolerance, fever or chills.     T(C):   HR: 80 (07-16-20 @ 11:24) (54 - 80)  BP: 118/70 (07-16-20 @ 11:24) (114/90 - 118/70)  RR: 18 (07-16-20 @ 11:24) (17 - 18)  SpO2: 100% (07-16-20 @ 11:24) (98% - 100%) (16 Jul 2020 11:59)      SUBJ:  Patient seen and examined. No new events. TVP in place - V-paced at 60. On pausing the device, patient has sinus with 2:1 AVB.      MEDICATIONS  (STANDING):  aDENosine Injectable (ADENOSCAN) 60 milliGRAM(s) IV Bolus once  chlorhexidine 4% Liquid 1 Application(s) Topical <User Schedule>    MEDICATIONS  (PRN):  ALBUTerol    90 MICROgram(s) HFA Inhaler 2 Puff(s) Inhalation every 6 hours PRN Shortness of Breath and/or Wheezing            Vital Signs Last 24 Hrs  T(C): 36.7 (17 Jul 2020 04:00), Max: 37.2 (16 Jul 2020 12:57)  T(F): 98.1 (17 Jul 2020 04:00), Max: 98.9 (16 Jul 2020 12:57)  HR: 58 (17 Jul 2020 06:00) (50 - 80)  BP: 114/75 (17 Jul 2020 06:00) (102/64 - 136/82)  BP(mean): 97 (17 Jul 2020 06:00) (77 - 107)  RR: 20 (17 Jul 2020 06:00) (14 - 23)  SpO2: 98% (17 Jul 2020 06:00) (95% - 100%)      PHYSICAL EXAM:    GEN: AAO x 3, NAD  HEENT: NC/AT, PERRL  Neck: No JVD, no bruits, TVP in RIJ  CV: Reg, S1-S2, no murmur  Lungs: CTAB  Abd: Soft, non-tender  Ext: No edema      I&O's Summary    16 Jul 2020 07:01  -  17 Jul 2020 07:00  --------------------------------------------------------  IN: 250 mL / OUT: 1475 mL / NET: -1225 mL    	    TELEMETRY: Paced    ECG:     TTE:  Pending    LABS:                        13.7   6.29  )-----------( 193      ( 17 Jul 2020 04:57 )             41.9     07-17    138  |  107  |  15  ----------------------------<  102<H>  4.6   |  23  |  0.8    Ca    8.5      17 Jul 2020 04:57  Phos  2.9     07-17  Mg     1.9     07-17    TPro  6.2  /  Alb  3.7  /  TBili  0.6  /  DBili  x   /  AST  21  /  ALT  18  /  AlkPhos  86  07-17    CARDIAC MARKERS ( 17 Jul 2020 04:57 )  x     / <0.01 ng/mL / x     / x     / x      CARDIAC MARKERS ( 16 Jul 2020 18:54 )  x     / <0.01 ng/mL / x     / x     / x      CARDIAC MARKERS ( 16 Jul 2020 10:33 )  x     / <0.01 ng/mL / x     / x     / x          PT/INR - ( 16 Jul 2020 10:33 )   PT: 12.20 sec;   INR: 1.06 ratio               BNPSerum Pro-Brain Natriuretic Peptide: 1034 pg/mL (07-16 @ 10:44)    RADIOLOGY & ADDITIONAL STUDIES:      IMPRESSION AND PLAN:

## 2020-07-17 NOTE — PROGRESS NOTE ADULT - SUBJECTIVE AND OBJECTIVE BOX
INTERVAL HISTORY: No overnight events. TVP still pacing at a HR of 60bpm  	  MEDICATIONS:  aDENosine Injectable (ADENOSCAN) 60 milliGRAM(s) IV Bolus once  ALBUTerol    90 MICROgram(s) HFA Inhaler 2 Puff(s) Inhalation every 6 hours PRN  cefTRIAXone   IVPB 2000 milliGRAM(s) IV Intermittent every 24 hours  chlorhexidine 4% Liquid 1 Application(s) Topical <User Schedule>      REVIEW OF SYSTEMS:  CONSTITUTIONAL: No fever, weight loss, or fatigue  NECK: No pain or stiffness  RESPIRATORY: Shortness of breath  CARDIOVASCULAR: Dizziness (resolved after TVP placement)  GASTROINTESTINAL: No abdominal or epigastric pain. No nausea, vomiting, or hematemesis; No diarrhea or constipation. No melena or hematochezia.  GENITOURINARY: No dysuria, frequency, hematuria, or incontinence  NEUROLOGICAL: No headaches, memory loss, loss of strength, numbness, or tremors  SKIN: No itching, burning, rashes, or lesions   ENDOCRINE: No heat or cold intolerance; No hair loss  MUSCULOSKELETAL: No joint pain or swelling; No muscle, back, or extremity pain  HEME/LYMPH: No easy bruising, or bleeding gums    PHYSICAL EXAM:  T(C): 36.7 (07-17-20 @ 04:00), Max: 37.2 (07-16-20 @ 12:57)  HR: 60 (07-17-20 @ 08:00) (50 - 80)  BP: 119/77 (07-17-20 @ 08:00) (102/64 - 136/82)  RR: 15 (07-17-20 @ 08:00) (14 - 23)  SpO2: 98% (07-17-20 @ 08:00) (95% - 100%)  Wt(kg): --  I&O's Summary    16 Jul 2020 07:01  -  17 Jul 2020 07:00  --------------------------------------------------------  IN: 250 mL / OUT: 1475 mL / NET: -1225 mL      Height (cm): 190.5 (07-17 @ 08:08)  Weight (kg): 111.3 (07-17 @ 08:08)  BMI (kg/m2): 30.7 (07-17 @ 08:08)  BSA (m2): 2.39 (07-17 @ 08:08)    GENERAL: Patient comfortable in bed  HEAD:  Atraumatic, Normocephalic  NECK: right TVP in place  NERVOUS SYSTEM:  Alert & Oriented X3, Good concentration  CHEST/LUNG: Clear to percussion bilaterally  HEART: Regular rate and rhythm  ABDOMEN: Soft, Nontender, Nondistended  EXTREMITIES: No LE edema  SKIN: No rashes or lesions    LABS:	 	    CARDIAC MARKERS ( 17 Jul 2020 04:57 )  x     / <0.01 ng/mL / x     / x     / x      CARDIAC MARKERS ( 16 Jul 2020 18:54 )  x     / <0.01 ng/mL / x     / x     / x      CARDIAC MARKERS ( 16 Jul 2020 10:33 )  x     / <0.01 ng/mL / x     / x     / x                                13.7   6.29  )-----------( 193      ( 17 Jul 2020 04:57 )             41.9     07-17    138  |  107  |  15  ----------------------------<  102<H>  4.6   |  23  |  0.8    Ca    8.5      17 Jul 2020 04:57  Phos  2.9     07-17  Mg     1.9     07-17    TPro  6.2  /  Alb  3.7  /  TBili  0.6  /  DBili  x   /  AST  21  /  ALT  18  /  AlkPhos  86  07-17    proBNP: Serum Pro-Brain Natriuretic Peptide: 1034 pg/mL (07-16 @ 10:44)    LYME IgG/IgM Antibodies Result: 0.14 Index (07.16.20 @ 10:44)    Thyroid Stimulating Hormone, Serum: 0.70 uIU/mL (07.16.20 @ 10:29)

## 2020-07-17 NOTE — PROGRESS NOTE ADULT - SUBJECTIVE AND OBJECTIVE BOX
HPI:  50 year old M with PMHx of cocaine-induced neutropenia, substance abuse (cocaine), presented to ED for 3 days of dyspnea and chest heaviness.  Patient states that for the last three days he was feeling dizzy and light headed when he ambulates. He was also een feeling short of breath for the past 6 months which he attributes to bronchitis as he is an active smoker. He stated that he felt more dizzy when he was ambulating, worse when he went up the stairs.  He also states that for the past 3 days he had chest heaviness that radiated to his back, moderate in intensity with no alleviating or aggrevating factors. Patient went to urgent care to get an inhaler for his shortness of breath and they sent him to the ED for abnormal EKG. Patient found to be second degree heart block on EKG on presentation.     TVP was placed with improvement of symptoms. He states that he drinks 3 red bulls every morning with one cup of coffee to get energy. He denies any exposure to the outdoors, tick bites, denies diarrhea, constipation, hot or cold intolerance, fever or chills.     INTERVAL HPI / OVERNIGHT EVENTS:  Patient was examined and seen at bedside. This morning he is resting comfortably in bed and reports no new issues or overnight events. His TVP is in place as confirmed by the Cardiac fellow, despite movement evidenced on CXR noted by the Radiologist.     ROS: Otherwise unremarkable     PAST MEDICAL & SURGICAL HISTORY  History of bunionectomy  Neutropenia: pt denies  Illicit drug use: pt denies  Alcoholism: clean 1 yr  No significant past surgical history    ALLERGIES  No Known Allergies    MEDICATIONS  STANDING MEDICATIONS  aDENosine Injectable (ADENOSCAN) 60 milliGRAM(s) IV Bolus once  cefTRIAXone   IVPB 2000 milliGRAM(s) IV Intermittent every 24 hours  chlorhexidine 4% Liquid 1 Application(s) Topical <User Schedule>    PRN MEDICATIONS  ALBUTerol    90 MICROgram(s) HFA Inhaler 2 Puff(s) Inhalation every 6 hours PRN    VITALS:  T(F): 98.2  HR: 62  BP: 111/78  RR: 20  SpO2: 99%    PHYSICAL EXAM  GEN: NAD, Resting comfortably in bed  PULM: Clear to auscultation bilaterally, No wheezes  CVS: RRR, S1-S2, no murmurs  ABD: Soft, non-tender, non-distended, no guarding  EXT: No edema  NEURO: A&Ox3, no focal deficits    LABS                        13.7   6.29  )-----------( 193      ( 17 Jul 2020 04:57 )             41.9     07-17    138  |  107  |  15  ----------------------------<  102<H>  4.6   |  23  |  0.8    Ca    8.5      17 Jul 2020 04:57  Phos  2.9     07-17  Mg     1.9     07-17    TPro  6.2  /  Alb  3.7  /  TBili  0.6  /  DBili  x   /  AST  21  /  ALT  18  /  AlkPhos  86  07-17    PT/INR - (16 Jul 2020 10:33 ) PT: 12.20 sec; INR: 1.06 ratio      Troponin T, Serum: <0.01 ng/mL (07-17-20 @ 04:57)  Troponin T, Serum: <0.01 ng/mL (07-16-20 @ 18:54)      CARDIAC MARKERS ( 17 Jul 2020 04:57 )  x     / <0.01 ng/mL / x     / x     / x      CARDIAC MARKERS ( 16 Jul 2020 18:54 )  x     / <0.01 ng/mL / x     / x     / x      CARDIAC MARKERS ( 16 Jul 2020 10:33 )  x     / <0.01 ng/mL / x     / x     / x          RADIOLOGY  CXR 7/17, Impression:    - No radiographic evidence of acute cardiopulmonary disease.  - Support devices as described.

## 2020-07-17 NOTE — PROGRESS NOTE ADULT - ASSESSMENT
ASSESSMENT  50 year old M with PMHx of cocaine-induced neutropenia, substance abuse (cocaine), presented to ED for 3 days of dyspnea and chest heaviness nwo stable in the ICU s/p TVP pending w/u for 2nd degree block with possible PPM or ICM placement.     PLAN  #) Stable Dyspnea/chest heaviness, now resolved  - likely d/t 2nd degree block, Mobitz II  - s/p TVP in ED  - Lyme serology negative  - TSH normal  - f/u ECHO  - f/u Nuclear MPI  - Keep Mg >2, K >4.0  - Cardio reccs appreciated; EP recc's appreciated  - NPO after stress test for possible PPM    #) Neutropenia Hx - cocaine-induced, now resolved  -outpatient heme follow up     #) Hx of cocaine abuse  - pt currently denies drug abuse  - Urine tox negative      DVT ppx: Begin Heparin subq 5000  GI ppx: Not indicated  Diet: DASH  Activity: Increase as tolerated    Dispo:  May need PPM ASSESSMENT  50 year old M with PMHx of cocaine-induced neutropenia, substance abuse (cocaine), presented to ED for 3 days of dyspnea and chest heaviness nwo stable in the ICU s/p TVP pending w/u for 2nd degree block with possible PPM or ICM placement.     PLAN  #) Stable Dyspnea/chest heaviness, now resolved  - likely d/t 2nd degree block, Mobitz II  - s/p TVP in ED  - Lyme serology negative  - TSH normal  - f/u ECHO  - f/u Nuclear MPI  - Keep Mg >2, K >4.0  - Cardio reccs appreciated; EP recc's appreciated  - NPO after stress test for possible PPM    #) Neutropenia Hx - cocaine-induced, now resolved  -outpatient heme follow up     #) Hx of cocaine abuse  - pt currently denies drug abuse  - Urine tox negative      DVT ppx: Begin Heparin subq 5000  GI ppx: Not indicated  Diet: DASH  Activity: Increase as tolerated  Code Status: FULL CODE

## 2020-07-17 NOTE — PROGRESS NOTE ADULT - ASSESSMENT
50 y.o male patient admitted to CCU with complete AV block.    # AV block  - Give one more dose of Ceftriaxone 2g IV  - F/U urine tox  - Nuclear stress test today to r/o ischemia as possible cause of block  - Keep NPO after stress test for possible PPM placement

## 2020-07-17 NOTE — PROGRESS NOTE ADULT - ASSESSMENT
TSH - normal  Lyme - negative  2D echo - pending  Nuclear MPI -= pending    Plan:    c/w TVP for now  stress and echo today  d/c Rocephin  EP evaluation appreciated - f/u today for possible permanent DDD PPM  Discussed with CCU team on rounds

## 2020-07-18 LAB
ANION GAP SERPL CALC-SCNC: 13 MMOL/L — SIGNIFICANT CHANGE UP (ref 7–14)
BASOPHILS # BLD AUTO: 0.07 K/UL — SIGNIFICANT CHANGE UP (ref 0–0.2)
BASOPHILS NFR BLD AUTO: 1.1 % — HIGH (ref 0–1)
BUN SERPL-MCNC: 12 MG/DL — SIGNIFICANT CHANGE UP (ref 10–20)
CALCIUM SERPL-MCNC: 8.8 MG/DL — SIGNIFICANT CHANGE UP (ref 8.5–10.1)
CHLORIDE SERPL-SCNC: 104 MMOL/L — SIGNIFICANT CHANGE UP (ref 98–110)
CO2 SERPL-SCNC: 21 MMOL/L — SIGNIFICANT CHANGE UP (ref 17–32)
CREAT SERPL-MCNC: 0.8 MG/DL — SIGNIFICANT CHANGE UP (ref 0.7–1.5)
EOSINOPHIL # BLD AUTO: 0.19 K/UL — SIGNIFICANT CHANGE UP (ref 0–0.7)
EOSINOPHIL NFR BLD AUTO: 3 % — SIGNIFICANT CHANGE UP (ref 0–8)
GLUCOSE SERPL-MCNC: 105 MG/DL — HIGH (ref 70–99)
HCT VFR BLD CALC: 43.4 % — SIGNIFICANT CHANGE UP (ref 42–52)
HGB BLD-MCNC: 14.3 G/DL — SIGNIFICANT CHANGE UP (ref 14–18)
IMM GRANULOCYTES NFR BLD AUTO: 0.5 % — HIGH (ref 0.1–0.3)
LYMPHOCYTES # BLD AUTO: 1.26 K/UL — SIGNIFICANT CHANGE UP (ref 1.2–3.4)
LYMPHOCYTES # BLD AUTO: 20.2 % — LOW (ref 20.5–51.1)
MAGNESIUM SERPL-MCNC: 1.9 MG/DL — SIGNIFICANT CHANGE UP (ref 1.8–2.4)
MCHC RBC-ENTMCNC: 29 PG — SIGNIFICANT CHANGE UP (ref 27–31)
MCHC RBC-ENTMCNC: 32.9 G/DL — SIGNIFICANT CHANGE UP (ref 32–37)
MCV RBC AUTO: 88 FL — SIGNIFICANT CHANGE UP (ref 80–94)
MONOCYTES # BLD AUTO: 0.58 K/UL — SIGNIFICANT CHANGE UP (ref 0.1–0.6)
MONOCYTES NFR BLD AUTO: 9.3 % — SIGNIFICANT CHANGE UP (ref 1.7–9.3)
NEUTROPHILS # BLD AUTO: 4.12 K/UL — SIGNIFICANT CHANGE UP (ref 1.4–6.5)
NEUTROPHILS NFR BLD AUTO: 65.9 % — SIGNIFICANT CHANGE UP (ref 42.2–75.2)
NRBC # BLD: 0 /100 WBCS — SIGNIFICANT CHANGE UP (ref 0–0)
PLATELET # BLD AUTO: 222 K/UL — SIGNIFICANT CHANGE UP (ref 130–400)
POTASSIUM SERPL-MCNC: 4.6 MMOL/L — SIGNIFICANT CHANGE UP (ref 3.5–5)
POTASSIUM SERPL-SCNC: 4.6 MMOL/L — SIGNIFICANT CHANGE UP (ref 3.5–5)
RBC # BLD: 4.93 M/UL — SIGNIFICANT CHANGE UP (ref 4.7–6.1)
RBC # FLD: 12.8 % — SIGNIFICANT CHANGE UP (ref 11.5–14.5)
SODIUM SERPL-SCNC: 138 MMOL/L — SIGNIFICANT CHANGE UP (ref 135–146)
WBC # BLD: 6.25 K/UL — SIGNIFICANT CHANGE UP (ref 4.8–10.8)
WBC # FLD AUTO: 6.25 K/UL — SIGNIFICANT CHANGE UP (ref 4.8–10.8)

## 2020-07-18 PROCEDURE — 99233 SBSQ HOSP IP/OBS HIGH 50: CPT

## 2020-07-18 PROCEDURE — 93010 ELECTROCARDIOGRAM REPORT: CPT

## 2020-07-18 PROCEDURE — 71045 X-RAY EXAM CHEST 1 VIEW: CPT | Mod: 26

## 2020-07-18 RX ADMIN — Medication 5 MILLIGRAM(S): at 21:45

## 2020-07-18 RX ADMIN — CHLORHEXIDINE GLUCONATE 1 APPLICATION(S): 213 SOLUTION TOPICAL at 05:18

## 2020-07-18 RX ADMIN — HEPARIN SODIUM 5000 UNIT(S): 5000 INJECTION INTRAVENOUS; SUBCUTANEOUS at 05:18

## 2020-07-18 RX ADMIN — HEPARIN SODIUM 5000 UNIT(S): 5000 INJECTION INTRAVENOUS; SUBCUTANEOUS at 14:11

## 2020-07-18 RX ADMIN — HEPARIN SODIUM 5000 UNIT(S): 5000 INJECTION INTRAVENOUS; SUBCUTANEOUS at 21:45

## 2020-07-18 NOTE — PROGRESS NOTE ADULT - ASSESSMENT
49 y/o man with PMH of cocaine and alcohol abuse (quit both) presented to the ED from urgent care for 3 days of dyspnea, chest heaviness and dizziness.  He was found with second degree heart block,  TVP was placed and he was admitted to CCU.     1. Advanced AV block requiring TVP  CCU monitoring  TSH WNL and lyme titers negative  nuclear stress test without ischemia but EF 40%  ECHO with normal EF  cardio and EP f/u re: further workup and pacemaker placement  guarded prognosis  full code status    2. H/O cocaine abuse  urine drug screen negative    3. Tobacco abuse  smoking cessation    4. DVT prophylaxis      PROGRESS NOTE HANDOFF    Pending: cardio and EP f/u    Disposition: home

## 2020-07-18 NOTE — PROGRESS NOTE ADULT - SUBJECTIVE AND OBJECTIVE BOX
ADRIANNA GARZA  50y Male    INTERVAL HPI/OVERNIGHT EVENTS:    Pt feels OK - no chest pain or SOB.  pacing on monitor    T(F): 97.9 (07-18-20 @ 04:00), Max: 98.2 (07-17-20 @ 12:00)  HR: 60 (07-18-20 @ 06:00) (60 - 76)  BP: 113/66 (07-18-20 @ 06:00) (90/70 - 132/95)  RR: 16 (07-18-20 @ 06:00) (14 - 39)  SpO2: 98% (07-18-20 @ 06:00) (96% - 99%)        I&O's Summary    17 Jul 2020 07:01  -  18 Jul 2020 07:00  --------------------------------------------------------  IN: 840 mL / OUT: 1400 mL / NET: -560 mL    18 Jul 2020 07:01  -  18 Jul 2020 11:39  --------------------------------------------------------  IN: 0 mL / OUT: 200 mL / NET: -200 mL      PHYSICAL EXAM:  GENERAL: NAD  HEAD:  Normocephalic  EYES:  conjunctiva and sclera clear  ENMT: Moist mucous membranes  NECK: TVP  NERVOUS SYSTEM:  Alert & Oriented X3, Good concentration  CHEST/LUNG: Clear to percussion bilaterally; No rales, rhonchi, wheezing  HEART: Regular rate and rhythm; No murmurs  ABDOMEN: Soft, Nontender, Nondistended  EXTREMITIES:   No edema  SKIN: No rashes     Consultant(s) Notes Reviewed:  [x ] YES  [ ] NO  Care Discussed with Consultants/Other Providers [ x] YES  [ ] NO    MEDICATIONS  (STANDING):  chlorhexidine 4% Liquid 1 Application(s) Topical <User Schedule>  heparin   Injectable 5000 Unit(s) SubCutaneous every 8 hours  melatonin 5 milliGRAM(s) Oral at bedtime    MEDICATIONS  (PRN):  acetaminophen   Tablet .. 650 milliGRAM(s) Oral every 6 hours PRN Mild Pain (1 - 3)  ALBUTerol    90 MICROgram(s) HFA Inhaler 2 Puff(s) Inhalation every 6 hours PRN Shortness of Breath and/or Wheezing      LABS:                        14.3   6.25  )-----------( 222      ( 18 Jul 2020 04:23 )             43.4     07-18    138  |  104  |  12  ----------------------------<  105<H>  4.6   |  21  |  0.8    Ca    8.8      18 Jul 2020 04:23  Phos  2.9     07-17  Mg     1.9     07-18    TPro  6.2  /  Alb  3.7  /  TBili  0.6  /  DBili  x   /  AST  21  /  ALT  18  /  AlkPhos  86  07-17    Thyroid Stimulating Hormone, Serum (07.16.20 @ 10:29)    Thyroid Stimulating Hormone, Serum: 0.70 uIU/mL    Borrelia burgdorferi IgG/IgM Antibodies (07.16.20 @ 10:44)    LYME IgG/IgM Antibodies Result: 0.14 Index    Lyme C6 Interpretation: Negative: METHOD: Liaison Chemiluminescent Immunoassay      Reference Range: (values expressed as Lyme Index )                                < 0.90        Negative                                0.90 - 1.09   Equivocal                                >= 1.10     Positive  CDC/ASTPHLD Guidelines recommend that all samples judged equivocal or  positive be re-tested by immunoblot for confirmation of results.          RADIOLOGY & ADDITIONAL TESTS:    Imaging or report Personally Reviewed:  [x ] YES  [ ] NO    < from: Transthoracic Echocardiogram (07.17.20 @ 08:17) >  Summary:   1. Normal global left ventricular systolic function.    < end of copied text >    < from: NM Nuclear Stress Pharmacologic Multiple (07.17.20 @ 16:59) >  Impression:   1. NO DEFINITE EVIDENCE FOR ISCHEMIA DURING ADENOSINE INFUSION.   2. MILD LEFT VENTRICULAR GLOBAL HYPOKINESIS. ALL WALLS OF THE LEFT VENTRICLE THICKEN  3. LEFT VENTRICULAR EJECTION FRACTION OF  40 % WHICH IS LOW.     < end of copied text >      Case discussed with resident    Care discussed with pt

## 2020-07-18 NOTE — PROGRESS NOTE ADULT - ASSESSMENT
Assessment: 50 year old M with PMHx of cocaine-induced neutropenia, substance abuse (cocaine), presented to ED for 3 days of dizziness, dyspnea and chest heaviness. He was found to be in second degree heart block at urgent care and sent to ED where TVP was placed with improvement of symptoms. Patient had nuclear stress which showed EF 40% however echo shows normal EF. TSH WNL and lyme titers negative.     Impression:  2nd Degree AV Block sp TVP  Dizziness  Substance Abuse    Plan:  - Get cardiac MRI to better assess EF and r/o infiltrative disease  - Patient most likely to need PPM placement prior to DC for HB  - Cont tele monitoring  - Monitor electrolytes, maintain WNL  - Will cont to follow Assessment: 49 yo M with PMHx of cocaine-induced neutropenia, substance abuse (cocaine), presented to ED for 3 days of dizziness, dyspnea and chest heaviness. He was found to be in second degree heart block at urgent care and sent to ED where TVP was placed with improvement of symptoms. Patient had nuclear stress which showed EF 40% however echo shows normal EF. TSH WNL and lyme titers negative.  Patient still using his TVP    Impression:  2nd Degree AV Block sp TVP  Dizziness  Substance Abuse    Plan:  - Get cardiac MRI to better assess EF and r/o infiltrative disease   - Patient most likely to need PPM placement prior to DC for HB unless he has infiltrative disease process  - Cont tele monitoring  - Monitor electrolytes, maintain WNL  - Will cont to follow

## 2020-07-18 NOTE — CHART NOTE - NSCHARTNOTEFT_GEN_A_CORE
Patient pending cardiac MRI,     As per Radiology department patient must be accompanied with the EP physician d/t KANDI

## 2020-07-18 NOTE — PROGRESS NOTE ADULT - SUBJECTIVE AND OBJECTIVE BOX
INTERVAL HPI/OVERNIGHT EVENTS: Patient ventricular paced at 60 bpm. Denies any dizziness, chest pain or syncope.    MEDICATIONS  (STANDING):  chlorhexidine 4% Liquid 1 Application(s) Topical <User Schedule>  heparin   Injectable 5000 Unit(s) SubCutaneous every 8 hours  melatonin 5 milliGRAM(s) Oral at bedtime    MEDICATIONS  (PRN):  acetaminophen   Tablet .. 650 milliGRAM(s) Oral every 6 hours PRN Mild Pain (1 - 3)  ALBUTerol    90 MICROgram(s) HFA Inhaler 2 Puff(s) Inhalation every 6 hours PRN Shortness of Breath and/or Wheezing      Allergies  No Known Allergies      REVIEW OF SYSTEMS: Denies CP, palpitations, dizziness or SOB    Vital Signs Last 24 Hrs  T(C): 36.2 (18 Jul 2020 11:40), Max: 36.8 (17 Jul 2020 12:00)  T(F): 97.1 (18 Jul 2020 11:40), Max: 98.2 (17 Jul 2020 12:00)  HR: 62 (18 Jul 2020 11:40) (60 - 76)  BP: 120/75 (18 Jul 2020 11:40) (90/70 - 132/95)  BP(mean): 85 (18 Jul 2020 11:40) (75 - 105)  RR: 28 (18 Jul 2020 11:40) (14 - 39)  SpO2: 98% (18 Jul 2020 08:00) (96% - 99%)    07-17-20 @ 07:01  -  07-18-20 @ 07:00  --------------------------------------------------------  IN: 840 mL / OUT: 1400 mL / NET: -560 mL    07-18-20 @ 07:01  -  07-18-20 @ 11:57  --------------------------------------------------------  IN: 0 mL / OUT: 200 mL / NET: -200 mL        Physical Exam  GENERAL: In no apparent distress, well nourished, and hydrated.  HEAD:  Atraumatic, Normocephalic  EYES: EOMI, PERRLA, conjunctiva and sclera clear  ENMT: No tonsillar erythema, exudates, or enlargements; ist mucous membranes, Good dentition, No lesions  NECK: Supple and normal thyroid.  No JVD or carotid bruit.  Carotid pulse is 2+ bilaterally.  HEART: Regular rate and rhythm; No murmurs, rubs, or gallops.  PULMONARY: Clear to auscultation and perfusion.  No rales, wheezing, or rhonchi bilaterally.  ABDOMEN: Soft, Nontender, Nondistended; Bowel sounds present  EXTREMITIES:  2+ Peripheral Pulses, No clubbing, cyanosis, or edema  NEUROLOGICAL: Grossly nonfocal    LABS:                        14.3   6.25  )-----------( 222      ( 18 Jul 2020 04:23 )             43.4     07-18    138  |  104  |  12  ----------------------------<  105<H>  4.6   |  21  |  0.8    Ca    8.8      18 Jul 2020 04:23  Phos  2.9     07-17  Mg     1.9     07-18    TPro  6.2  /  Alb  3.7  /  TBili  0.6  /  DBili  x   /  AST  21  /  ALT  18  /  AlkPhos  86  07-17      TELE: V-Paced Rhythm 62 bpm    RADIOLOGY & ADDITIONAL TESTS:  < from: 12 Lead ECG (07.17.20 @ 05:44) >    Ventricular Rate 62 BPM    Atrial Rate 105 BPM    QRS Duration 118 ms    Q-T Interval 448 ms    QTC Calculation(Bezet) 454 ms    P Axis 73 degrees    R Axis -52 degrees    T Axis 98 degrees    Diagnosis Line Ventricular-paced rhythm with occasionalPremature ventricular complexes  Abnormal ECG    Confirmed by KIRK LEAVITT MD ([id number]) on 7/17/2020 6:44:18 AM INTERVAL HPI/OVERNIGHT EVENTS: Patient ventricular paced at 60 bpm. Denies any dizziness, chest pain or syncope.    MEDICATIONS  (STANDING):  chlorhexidine 4% Liquid 1 Application(s) Topical <User Schedule>  heparin   Injectable 5000 Unit(s) SubCutaneous every 8 hours  melatonin 5 milliGRAM(s) Oral at bedtime    MEDICATIONS  (PRN):  acetaminophen   Tablet .. 650 milliGRAM(s) Oral every 6 hours PRN Mild Pain (1 - 3)  ALBUTerol    90 MICROgram(s) HFA Inhaler 2 Puff(s) Inhalation every 6 hours PRN Shortness of Breath and/or Wheezing      Allergies  No Known Allergies      REVIEW OF SYSTEMS: Denies CP, palpitations, dizziness or SOB    Vital Signs Last 24 Hrs  T(C): 36.2 (18 Jul 2020 11:40), Max: 36.8 (17 Jul 2020 12:00)  T(F): 97.1 (18 Jul 2020 11:40), Max: 98.2 (17 Jul 2020 12:00)  HR: 62 (18 Jul 2020 11:40) (60 - 76)  BP: 120/75 (18 Jul 2020 11:40) (90/70 - 132/95)  BP(mean): 85 (18 Jul 2020 11:40) (75 - 105)  RR: 28 (18 Jul 2020 11:40) (14 - 39)  SpO2: 98% (18 Jul 2020 08:00) (96% - 99%)    07-17-20 @ 07:01  -  07-18-20 @ 07:00  --------------------------------------------------------  IN: 840 mL / OUT: 1400 mL / NET: -560 mL    07-18-20 @ 07:01  -  07-18-20 @ 11:57  --------------------------------------------------------  IN: 0 mL / OUT: 200 mL / NET: -200 mL        Physical Exam  GENERAL: In no apparent distress, well nourished, and hydrated.  HEAD:  Atraumatic, Normocephalic  EYES: EOMI, PERRLA, conjunctiva and sclera clear  ENMT: MMM  NECK: Supple. R IJ TVP in place  HEART: Regular rate and rhythm  PULMONARY: Clear to auscultation and perfusion.  No rales, wheezing, or rhonchi bilaterally.  ABDOMEN: Soft, Nontender, Nondistended; Bowel sounds present  EXTREMITIES:  2+ Peripheral Pulses, No clubbing, cyanosis, or edema  NEUROLOGICAL: Grossly nonfocal    LABS:                        14.3   6.25  )-----------( 222      ( 18 Jul 2020 04:23 )             43.4     07-18    138  |  104  |  12  ----------------------------<  105<H>  4.6   |  21  |  0.8    Ca    8.8      18 Jul 2020 04:23  Phos  2.9     07-17  Mg     1.9     07-18    TPro  6.2  /  Alb  3.7  /  TBili  0.6  /  DBili  x   /  AST  21  /  ALT  18  /  AlkPhos  86  07-17      TELE: V-Paced Rhythm 62 bpm    RADIOLOGY & ADDITIONAL TESTS:  < from: 12 Lead ECG (07.17.20 @ 05:44) >    Ventricular Rate 62 BPM    Atrial Rate 105 BPM    QRS Duration 118 ms    Q-T Interval 448 ms    QTC Calculation(Bezet) 454 ms    P Axis 73 degrees    R Axis -52 degrees    T Axis 98 degrees    Diagnosis Line Ventricular-paced rhythm with occasionalPremature ventricular complexes  Abnormal ECG    Confirmed by KIRK LEAVITT MD ([id number]) on 7/17/2020 6:44:18 AM

## 2020-07-19 LAB
ANION GAP SERPL CALC-SCNC: 12 MMOL/L — SIGNIFICANT CHANGE UP (ref 7–14)
BASOPHILS # BLD AUTO: 0.08 K/UL — SIGNIFICANT CHANGE UP (ref 0–0.2)
BASOPHILS NFR BLD AUTO: 1 % — SIGNIFICANT CHANGE UP (ref 0–1)
BUN SERPL-MCNC: 15 MG/DL — SIGNIFICANT CHANGE UP (ref 10–20)
CALCIUM SERPL-MCNC: 8.7 MG/DL — SIGNIFICANT CHANGE UP (ref 8.5–10.1)
CHLORIDE SERPL-SCNC: 105 MMOL/L — SIGNIFICANT CHANGE UP (ref 98–110)
CHOLEST SERPL-MCNC: 177 MG/DL — SIGNIFICANT CHANGE UP (ref 100–200)
CO2 SERPL-SCNC: 22 MMOL/L — SIGNIFICANT CHANGE UP (ref 17–32)
CREAT SERPL-MCNC: 0.8 MG/DL — SIGNIFICANT CHANGE UP (ref 0.7–1.5)
EOSINOPHIL # BLD AUTO: 0.17 K/UL — SIGNIFICANT CHANGE UP (ref 0–0.7)
EOSINOPHIL NFR BLD AUTO: 2.2 % — SIGNIFICANT CHANGE UP (ref 0–8)
GLUCOSE SERPL-MCNC: 95 MG/DL — SIGNIFICANT CHANGE UP (ref 70–99)
HCT VFR BLD CALC: 42.8 % — SIGNIFICANT CHANGE UP (ref 42–52)
HDLC SERPL-MCNC: 29 MG/DL — LOW
HGB BLD-MCNC: 14.1 G/DL — SIGNIFICANT CHANGE UP (ref 14–18)
IMM GRANULOCYTES NFR BLD AUTO: 0.6 % — HIGH (ref 0.1–0.3)
LIPID PNL WITH DIRECT LDL SERPL: 128 MG/DL — SIGNIFICANT CHANGE UP (ref 4–129)
LYMPHOCYTES # BLD AUTO: 1.69 K/UL — SIGNIFICANT CHANGE UP (ref 1.2–3.4)
LYMPHOCYTES # BLD AUTO: 21.9 % — SIGNIFICANT CHANGE UP (ref 20.5–51.1)
MAGNESIUM SERPL-MCNC: 1.9 MG/DL — SIGNIFICANT CHANGE UP (ref 1.8–2.4)
MCHC RBC-ENTMCNC: 28.7 PG — SIGNIFICANT CHANGE UP (ref 27–31)
MCHC RBC-ENTMCNC: 32.9 G/DL — SIGNIFICANT CHANGE UP (ref 32–37)
MCV RBC AUTO: 87 FL — SIGNIFICANT CHANGE UP (ref 80–94)
MONOCYTES # BLD AUTO: 0.71 K/UL — HIGH (ref 0.1–0.6)
MONOCYTES NFR BLD AUTO: 9.2 % — SIGNIFICANT CHANGE UP (ref 1.7–9.3)
NEUTROPHILS # BLD AUTO: 5 K/UL — SIGNIFICANT CHANGE UP (ref 1.4–6.5)
NEUTROPHILS NFR BLD AUTO: 65.1 % — SIGNIFICANT CHANGE UP (ref 42.2–75.2)
NRBC # BLD: 0 /100 WBCS — SIGNIFICANT CHANGE UP (ref 0–0)
PLATELET # BLD AUTO: 203 K/UL — SIGNIFICANT CHANGE UP (ref 130–400)
POTASSIUM SERPL-MCNC: 4.4 MMOL/L — SIGNIFICANT CHANGE UP (ref 3.5–5)
POTASSIUM SERPL-SCNC: 4.4 MMOL/L — SIGNIFICANT CHANGE UP (ref 3.5–5)
RBC # BLD: 4.92 M/UL — SIGNIFICANT CHANGE UP (ref 4.7–6.1)
RBC # FLD: 12.9 % — SIGNIFICANT CHANGE UP (ref 11.5–14.5)
SODIUM SERPL-SCNC: 139 MMOL/L — SIGNIFICANT CHANGE UP (ref 135–146)
TOTAL CHOLESTEROL/HDL RATIO MEASUREMENT: 6.1 RATIO — HIGH (ref 4–5.5)
TRIGL SERPL-MCNC: 132 MG/DL — SIGNIFICANT CHANGE UP (ref 10–149)
WBC # BLD: 7.7 K/UL — SIGNIFICANT CHANGE UP (ref 4.8–10.8)
WBC # FLD AUTO: 7.7 K/UL — SIGNIFICANT CHANGE UP (ref 4.8–10.8)

## 2020-07-19 PROCEDURE — 71045 X-RAY EXAM CHEST 1 VIEW: CPT | Mod: 26

## 2020-07-19 PROCEDURE — 99233 SBSQ HOSP IP/OBS HIGH 50: CPT

## 2020-07-19 RX ADMIN — Medication 5 MILLIGRAM(S): at 21:10

## 2020-07-19 RX ADMIN — CHLORHEXIDINE GLUCONATE 1 APPLICATION(S): 213 SOLUTION TOPICAL at 06:31

## 2020-07-19 RX ADMIN — HEPARIN SODIUM 5000 UNIT(S): 5000 INJECTION INTRAVENOUS; SUBCUTANEOUS at 06:32

## 2020-07-19 RX ADMIN — HEPARIN SODIUM 5000 UNIT(S): 5000 INJECTION INTRAVENOUS; SUBCUTANEOUS at 18:13

## 2020-07-19 RX ADMIN — HEPARIN SODIUM 5000 UNIT(S): 5000 INJECTION INTRAVENOUS; SUBCUTANEOUS at 21:10

## 2020-07-19 NOTE — PROGRESS NOTE ADULT - SUBJECTIVE AND OBJECTIVE BOX
HPI:  50 year old M with PMHx of cocaine-induced neutropenia, substance abuse (cocaine), presented to ED for 3 days of dyspnea and chest heaviness.  Patient states that for the last three days he was feeling dizzy and light headed when he ambulates. He was also een feeling short of breath for the past 6 months which he attributes to bronchitis as he is an active smoker. He stated that he felt more dizzy when he was ambulating, worse when he went up the stairs.  He also states that for the past 3 days he had chest heaviness that radiated to his back, moderate in intensity with no alleviating or aggravating factors. Patient went to urgent care to get an inhaler for his shortness of breath and they sent him to the ED for abnormal EKG. Patient found to be second degree heart block on EKG on presentation.     TVP was placed with improvement of symptoms. He states that he drinks 3 red bulls every morning with one cup of coffee to get energy. He denies any exposure to the outdoors, tick bites, denies diarrhea, constipation, hot or cold intolerance, fever or chills.     INTERVAL HPI / OVERNIGHT EVENTS:  Patient was examined and seen at bedside. This evening he is resting comfortably in bed and reports no new issues or overnight events. His TVP is in place, pacing adequately, and he is awaiting Cardiac MRI for further evaluation of his AV block.     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 650 milliGRAM(s) Oral every 6 hours PRN  ALBUTerol    90 MICROgram(s) HFA Inhaler 2 Puff(s) Inhalation every 6 hours PRN    VITALS:  T(F): 98  HR: 60  BP: 107/82  RR: 32  SpO2: 98%    PHYSICAL EXAM  GEN: NAD, Resting comfortably in bed  PULM: Clear to auscultation bilaterally, No wheezes  CVS: RRR, S1-S2, no murmurs  ABD: Soft, non-tender, non-distended, no guarding  EXT: No edema  NEURO: A&Ox3, no focal deficits    LABS                        14.1   7.70  )-----------( 203      ( 19 Jul 2020 04:30 )             42.8     07-19    139  |  105  |  15  ----------------------------<  95  4.4   |  22  |  0.8    Ca    8.7      19 Jul 2020 04:30  Mg     1.9     07-19      Culture - Blood (collected 17 Jul 2020 21:02)  Source: .Blood None  Preliminary Report (19 Jul 2020 04:52):    No growth to date.      RADIOLOGY    NM Stress Test 7/17, Impression:   1. NO DEFINITE EVIDENCE FOR ISCHEMIA DURING ADENOSINE INFUSION.   2. MILD LEFT VENTRICULAR GLOBAL HYPOKINESIS. ALL WALLS OF THE LEFT VENTRICLE THICKEN  3. LEFT VENTRICULAR EJECTION FRACTION OF  40 % WHICH IS LOW.       ECHO 7/17:   Summary:   1. Normal global left ventricular systolic function.    PHYSICIAN INTERPRETATION:  Left Ventricle: The left ventricular internal cavity size is normal. Left ventricular wall thickness is normal. Global LV systolic function was normal. Normal segmental left ventricular systolic function.

## 2020-07-19 NOTE — PROGRESS NOTE ADULT - ASSESSMENT
ASSESSMENT  50 year old M with PMHx of cocaine-induced neutropenia, substance abuse (cocaine), presented to ED for 3 days of dyspnea and chest heaviness now stable in the ICU s/p TVP pending w/u for 2nd degree block with possible PPM or ICM placement.     PLAN  #) Stable Dyspnea/chest heaviness, now resolved  - likely d/t 2nd degree block, Mobitz II  - s/p TVP in ED  - ECHO: reduced EF, Normal LV fx  - Nuclear MPI: No evidence of ischemia  - Lyme serology negative  - TSH normal  - Keep Mg >2, K >4.0  - f/u Cardiac MRI   - Cardio reccs appreciated; EP recc's appreciated    #) Neutropenia Hx - cocaine-induced, now resolved  -outpatient heme follow up     #) Hx of cocaine abuse  - pt currently denies drug abuse  - Urine tox negative      DVT ppx: Begin Heparin subq 5000  GI ppx: Not indicated  Diet: DASH  Activity: Increase as tolerated  Code Status: FULL CODE

## 2020-07-19 NOTE — PROGRESS NOTE ADULT - SUBJECTIVE AND OBJECTIVE BOX
ADRIANNA GARZA  50y Male    INTERVAL HPI/OVERNIGHT EVENTS:    No new complaints. HR dropped to 40 when he wasn't being paced.      T(F): 98.1 (07-19-20 @ 08:00), Max: 98.1 (07-19-20 @ 08:00)  HR: 60 (07-19-20 @ 10:00) (58 - 60)  BP: 114/83 (07-19-20 @ 08:00) (86/62 - 119/72)  RR: 25 (07-19-20 @ 10:00) (16 - 36)  SpO2: 98% (07-19-20 @ 08:00) (96% - 99%)    I&O's Summary    18 Jul 2020 07:01  -  19 Jul 2020 07:00  --------------------------------------------------------  IN: 300 mL / OUT: 500 mL / NET: -200 mL      PHYSICAL EXAM:  GENERAL: NAD  HEAD:  Normocephalic  EYES:  conjunctiva and sclera clear  ENMT: Moist mucous membranes  NECK: Supple, No JVD, + TVP right neck  NERVOUS SYSTEM:  Alert & Oriented X3, Good concentration  CHEST/LUNG: Clear to percussion bilaterally; No rales, rhonchi, wheezing  HEART: Regular rate and rhythm  ABDOMEN: Soft, Nontender, Nondistended  EXTREMITIES: No edema  SKIN: No rashes     Consultant(s) Notes Reviewed:  [x ] YES  [ ] NO  Care Discussed with Consultants/Other Providers [ x] YES  [ ] NO    MEDICATIONS  (STANDING):  chlorhexidine 4% Liquid 1 Application(s) Topical <User Schedule>  heparin   Injectable 5000 Unit(s) SubCutaneous every 8 hours  melatonin 5 milliGRAM(s) Oral at bedtime    MEDICATIONS  (PRN):  acetaminophen   Tablet .. 650 milliGRAM(s) Oral every 6 hours PRN Mild Pain (1 - 3)  ALBUTerol    90 MICROgram(s) HFA Inhaler 2 Puff(s) Inhalation every 6 hours PRN Shortness of Breath and/or Wheezing    Telemetry reviewed    LABS:                        14.1   7.70  )-----------( 203      ( 19 Jul 2020 04:30 )             42.8     07-19    139  |  105  |  15  ----------------------------<  95  4.4   |  22  |  0.8    Ca    8.7      19 Jul 2020 04:30  Mg     1.9     07-19              Culture - Blood (collected 17 Jul 2020 21:02)  Source: .Blood None  Preliminary Report (19 Jul 2020 04:52):    No growth to date.          Case discussed with resident    Care discussed with pt and chrissy

## 2020-07-19 NOTE — PROGRESS NOTE ADULT - ASSESSMENT
51 y/o man with PMH of cocaine and alcohol abuse (quit both) presented to the ED from urgent care for 3 days of dyspnea, chest heaviness and dizziness.  He was found with second degree heart block, TVP was placed and he was admitted to CCU.     1. Advanced AV block requiring TVP  CCU monitoring  workup for reversible causes in progress and so far negative  TSH WNL and lyme titers negative  nuclear stress test without ischemia but EF 40%  ECHO with normal EF  cardio f/u re: further workup (fiance is asking about cardiac cath)  EP f/u appreciated - cardiac MRI ordered - pt needs EP present for test per radiology  guarded prognosis  full code status    2. H/O cocaine abuse  urine drug screen negative    3. Tobacco abuse  smoking cessation    4. DVT prophylaxis      PROGRESS NOTE HANDOFF    Pending: cardio f/u, cardiac MRI, pacemaker placement    Disposition: home

## 2020-07-20 LAB
ANION GAP SERPL CALC-SCNC: 12 MMOL/L — SIGNIFICANT CHANGE UP (ref 7–14)
BUN SERPL-MCNC: 15 MG/DL — SIGNIFICANT CHANGE UP (ref 10–20)
CALCIUM SERPL-MCNC: 8.8 MG/DL — SIGNIFICANT CHANGE UP (ref 8.5–10.1)
CHLORIDE SERPL-SCNC: 104 MMOL/L — SIGNIFICANT CHANGE UP (ref 98–110)
CO2 SERPL-SCNC: 23 MMOL/L — SIGNIFICANT CHANGE UP (ref 17–32)
CREAT SERPL-MCNC: 1 MG/DL — SIGNIFICANT CHANGE UP (ref 0.7–1.5)
GLUCOSE SERPL-MCNC: 109 MG/DL — HIGH (ref 70–99)
HCT VFR BLD CALC: 43 % — SIGNIFICANT CHANGE UP (ref 42–52)
HGB BLD-MCNC: 14.1 G/DL — SIGNIFICANT CHANGE UP (ref 14–18)
MAGNESIUM SERPL-MCNC: 1.9 MG/DL — SIGNIFICANT CHANGE UP (ref 1.8–2.4)
MCHC RBC-ENTMCNC: 29 PG — SIGNIFICANT CHANGE UP (ref 27–31)
MCHC RBC-ENTMCNC: 32.8 G/DL — SIGNIFICANT CHANGE UP (ref 32–37)
MCV RBC AUTO: 88.5 FL — SIGNIFICANT CHANGE UP (ref 80–94)
NRBC # BLD: 0 /100 WBCS — SIGNIFICANT CHANGE UP (ref 0–0)
PLATELET # BLD AUTO: 204 K/UL — SIGNIFICANT CHANGE UP (ref 130–400)
POTASSIUM SERPL-MCNC: 4.6 MMOL/L — SIGNIFICANT CHANGE UP (ref 3.5–5)
POTASSIUM SERPL-SCNC: 4.6 MMOL/L — SIGNIFICANT CHANGE UP (ref 3.5–5)
RBC # BLD: 4.86 M/UL — SIGNIFICANT CHANGE UP (ref 4.7–6.1)
RBC # FLD: 13.1 % — SIGNIFICANT CHANGE UP (ref 11.5–14.5)
SODIUM SERPL-SCNC: 139 MMOL/L — SIGNIFICANT CHANGE UP (ref 135–146)
WBC # BLD: 7.35 K/UL — SIGNIFICANT CHANGE UP (ref 4.8–10.8)
WBC # FLD AUTO: 7.35 K/UL — SIGNIFICANT CHANGE UP (ref 4.8–10.8)

## 2020-07-20 PROCEDURE — 71045 X-RAY EXAM CHEST 1 VIEW: CPT | Mod: 26

## 2020-07-20 PROCEDURE — 99232 SBSQ HOSP IP/OBS MODERATE 35: CPT

## 2020-07-20 PROCEDURE — 99231 SBSQ HOSP IP/OBS SF/LOW 25: CPT | Mod: 57

## 2020-07-20 PROCEDURE — 99233 SBSQ HOSP IP/OBS HIGH 50: CPT

## 2020-07-20 PROCEDURE — 93010 ELECTROCARDIOGRAM REPORT: CPT

## 2020-07-20 PROCEDURE — 33208 INSRT HEART PM ATRIAL & VENT: CPT | Mod: KX

## 2020-07-20 RX ORDER — ACETAMINOPHEN 500 MG
650 TABLET ORAL EVERY 6 HOURS
Refills: 0 | Status: DISCONTINUED | OUTPATIENT
Start: 2020-07-20 | End: 2020-07-21

## 2020-07-20 RX ORDER — TRAMADOL HYDROCHLORIDE 50 MG/1
25 TABLET ORAL ONCE
Refills: 0 | Status: DISCONTINUED | OUTPATIENT
Start: 2020-07-20 | End: 2020-07-20

## 2020-07-20 RX ORDER — ALBUTEROL 90 UG/1
2 AEROSOL, METERED ORAL EVERY 6 HOURS
Refills: 0 | Status: DISCONTINUED | OUTPATIENT
Start: 2020-07-20 | End: 2020-07-21

## 2020-07-20 RX ORDER — CHLORHEXIDINE GLUCONATE 213 G/1000ML
1 SOLUTION TOPICAL
Refills: 0 | Status: DISCONTINUED | OUTPATIENT
Start: 2020-07-20 | End: 2020-07-21

## 2020-07-20 RX ORDER — ACETAMINOPHEN 500 MG
650 TABLET ORAL ONCE
Refills: 0 | Status: COMPLETED | OUTPATIENT
Start: 2020-07-20 | End: 2020-07-20

## 2020-07-20 RX ORDER — LANOLIN ALCOHOL/MO/W.PET/CERES
5 CREAM (GRAM) TOPICAL AT BEDTIME
Refills: 0 | Status: DISCONTINUED | OUTPATIENT
Start: 2020-07-20 | End: 2020-07-21

## 2020-07-20 RX ADMIN — TRAMADOL HYDROCHLORIDE 25 MILLIGRAM(S): 50 TABLET ORAL at 21:08

## 2020-07-20 RX ADMIN — TRAMADOL HYDROCHLORIDE 25 MILLIGRAM(S): 50 TABLET ORAL at 21:40

## 2020-07-20 RX ADMIN — CHLORHEXIDINE GLUCONATE 1 APPLICATION(S): 213 SOLUTION TOPICAL at 06:30

## 2020-07-20 RX ADMIN — Medication 650 MILLIGRAM(S): at 20:46

## 2020-07-20 RX ADMIN — Medication 650 MILLIGRAM(S): at 19:58

## 2020-07-20 RX ADMIN — Medication 5 MILLIGRAM(S): at 21:08

## 2020-07-20 RX ADMIN — HEPARIN SODIUM 5000 UNIT(S): 5000 INJECTION INTRAVENOUS; SUBCUTANEOUS at 06:30

## 2020-07-20 NOTE — PROGRESS NOTE ADULT - ASSESSMENT
50 y.o male patient presented to the ED for dizziness, found to be in complete heart block    # Complete heart block  - Patient still pacemaker dependent   - Will get dual-chamber pacemaker today: keep NPO, no pharmacologic DVT prophylaxis before procedure  - Will get MRI after pacemaker placement

## 2020-07-20 NOTE — CONSULT NOTE ADULT - SUBJECTIVE AND OBJECTIVE BOX
Surgeon: Dr. Jordan    Consult requesting by: Al    HISTORY OF PRESENT ILLNESS:  50 year old M with PMHx of cocaine-induced neutropenia, substance abuse (cocaine), presented to ED for 3 days of dyspnea and chest heaviness.  Patient stated that for the last three days PTA he has been feeling dizzy and light headed when he ambulates. He  has also been feeling short of breath for the past 6 months which he attributes to bronchitis as he is an active smoker.  He stated that he felt more dizzy when he was ambulating, worse when he went up the stairs.  He also states that he had chest heaviness that radiated to his back, moderate in intensity with no alleviating or aggrevating factors 3 days PTA. Patient went to urgent care to get an inhaler for his shortness of breath and they sent him to the ED for abnormal EKG. Patient found to be second degree heart block on EKG on presentation. TVP was placed with improvement of symptoms. Patient evaluated for reversible causes of bradycardia and found to have none so referred for PPM. 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 650 milliGRAM(s) Oral every 6 hours PRN Mild Pain (1 - 3)  ALBUTerol    90 MICROgram(s) HFA Inhaler 2 Puff(s) Inhalation every 6 hours PRN Shortness of Breath and/or Wheezing    Antiplatelet therapy:       none                    Last dose/amt:    Allergies    No Known Allergies    Intolerances        SOCIAL HISTORY:  Smoker: [ ] Yes ETOH: social  Ilicit Drug use:   [ ] No  FAMILY HISTORY:  FH: diabetes mellitus      Review of Systems  CONSTITUTIONAL: no fever, chills or night sweats]   NEURO:  denies seizures, paralysis or paresthesias                                                                                EYES: wears glasses, no double vision, no blurry vision                                                                          ENMT: no difficulty hearing, vertigo, dysphagia,epistaxis recent dental work [ ]                                      CV:  denies chest pain, CLARKE or palpatations at current time                                                                                            RESPIRATORY:  no cough or hemoptysis                                                                 GI:  no nausea, vomiting, constipation or diarrhea   : denies dysuria, hematuria, incontinence or retention                                                                                           MUSKULOSKELETAL:  denies joint swelling or muscle weakness  PSYCH:  denies dementia, depresion, anxiety   ENDOCRINE:  cold intolerance[ ] heat intolerance[ ] polydipsia[ ]                                                                                                                                                                                                PHYSICAL EXAM  Vital Signs Last 24 Hrs  T(C): 36.2 (20 Jul 2020 12:00), Max: 36.8 (19 Jul 2020 16:00)  T(F): 97.1 (20 Jul 2020 12:00), Max: 98.3 (19 Jul 2020 16:00)  HR: 60 (20 Jul 2020 12:00) (58 - 62)  BP: 96/64 (20 Jul 2020 12:00) (88/72 - 126/77)  BP(mean): 103 (20 Jul 2020 08:55) (69 - 103)  RR: 18 (20 Jul 2020 12:00) (16 - 30)  SpO2: 99% (20 Jul 2020 12:00) (98% - 99%)  Right arm bp: Left arm bp;    CONSTITUTIONAL:    well nourished, well developed, overweight in NAD                                                                       Neuro: oriented to person/place & time with no focal motor or sensory  deficits     Eyes: PERRLA, EOMI, no nystagmus, sclera anicteric  ENT:  nasal/oral mucosa with absence of cyanosis, fair dentition  Neck: no jugular vein distention, trachea midline, no goiter   Chest: bilatertal breath sounds with good air movement absence of wheezes, rales, or rhonchi                                                                           CV:  RSR, S1S2, no significant murmur appreciated  Carotids: No Bruits  GI:  soft, non-tender non-distended, + bowel sounds                                                                                                          Extremities:  no evidence of cyanosis or deformity, no pedal edema Edema  Extremity Pulses: right / left:  femorals 2+/ 2+; DP's 2+/2+; radials 2+/2+ negative Allens  SKIN : no rashes                                                          LABS:                        14.1   7.35  )-----------( 204      ( 20 Jul 2020 04:23 )             43.0     07-20    139  |  104  |  15  ----------------------------<  109<H>  4.6   |  23  |  1.0    Ca    8.8      20 Jul 2020 04:23  Mg     1.9     07-20    Xray Chest 1 View- PORTABLE-Routine (07.19.20 @ 09:21) >  Impression:      No radiographic evidence of acute cardiopulmonary disease.    Support devices as described. Without change.     NM Nuclear Stress Pharmacologic Multiple (07.17.20 @ 16:59) >  1. NO DEFINITE EVIDENCE FOR ISCHEMIA DURING ADENOSINE INFUSION.   2. MILD LEFT VENTRICULAR GLOBAL HYPOKINESIS. ALL WALLS OF THE LEFT VENTRICLE THICKEN  3. LEFT VENTRICULAR EJECTION FRACTION OF  40 % WHICH IS LOW.      12 Lead ECG (07.20.20 @ 05:06) >  Ventricular Rate 60 BPM    Atrial Rate 60 BPM    QRS Duration 162 ms    Q-T Interval 492 ms    QTC Calculation(Bezet) 492 ms    R Axis -63 degrees    T Axis 91 degrees    Diagnosis Line Ventricular-paced rhythm  Abnormal ECG     Transthoracic Echocardiogram (07.17.20 @ 08:17) >  Left Ventricle: The left ventricular internal cavity size is normal. Left ventricular wall thickness is normal. Global LV systolic function was normal. Normal segmental left ventricular systolic function.       LV Wall Scoring:  All segments are normal.    Right Ventricle: Normal right ventricular size and function.  Left Atrium: Mildly enlarged left atrium.  Right Atrium: Normal right atrial size.  Pericardium: There is no evidence of pericardial effusion.  Mitral Valve: The mitral valve is normal in structure. Trace mitral valve regurgitation is seen.  Tricuspid Valve: The tricuspid valve is normal in structure. Mild tricuspid regurgitation is visualized.  Aortic Valve: The aortic valve is trileaflet. No evidence of aortic stenosis. No evidence of aortic valve regurgitation is seen.  Aorta: The aortic root is normal in size and structure.    COVID-19: NotDetec (07-16-20 @ 12:20)      Assessment/ Plan:    50 year old M with PMHx of cocaine-induced neutropenia, substance abuse (cocaine), presented to ED for 3 days of dyspnea and chest heaviness now stable in the ICU s/p TVP for 2nd degree block will  go for PPM  placement.     PLAN  #) Advanced 2:1 AV Block presented with Dyspnea/chest heaviness (now resolved)  - s/p TVP in ED  -- PPM placement today and cardiac MRI after  - keep NPO Surgeon: Dr. Jordan    Consult requesting by: Al    HISTORY OF PRESENT ILLNESS:  50 year old M with PMHx of cocaine-induced neutropenia, substance abuse (cocaine), presented to ED for 3 days of dyspnea and chest heaviness.  Patient stated that for the last three days PTA he has been feeling dizzy and light headed when he ambulates. He  has also been feeling short of breath for the past 6 months which he attributes to bronchitis as he is an active smoker.  He stated that he felt more dizzy when he was ambulating, worse when he went up the stairs.  He also states that he had chest heaviness that radiated to his back, moderate in intensity with no alleviating or aggrevating factors 3 days PTA. Patient went to urgent care to get an inhaler for his shortness of breath and they sent him to the ED for abnormal EKG. Patient found to be second degree heart block on EKG on presentation. TVP was placed with improvement of symptoms. Patient evaluated for reversible causes of bradycardia and found to have none so referred for PPM. 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 650 milliGRAM(s) Oral every 6 hours PRN Mild Pain (1 - 3)  ALBUTerol    90 MICROgram(s) HFA Inhaler 2 Puff(s) Inhalation every 6 hours PRN Shortness of Breath and/or Wheezing    Antiplatelet therapy:       none                    Last dose/amt:    Allergies    No Known Allergies    Intolerances        SOCIAL HISTORY:  Smoker: [ ] Yes ETOH: social  Ilicit Drug use:   [ ] None recent  FAMILY HISTORY:  FH: diabetes mellitus      Review of Systems  CONSTITUTIONAL: no fever, chills or night sweats]   NEURO:  denies seizures, paralysis or paresthesias                                                                                EYES: no glasses, no double vision, no blurry vision                                                                          ENMT: no difficulty hearing, + vertigo, no dysphagia, epistaxis recent dental work [ ]                                      CV:  denies chest pain, CLARKE or palpatations at current time                                                                                            RESPIRATORY:  no cough or hemoptysis                                                                 GI:  no nausea, vomiting, constipation or diarrhea   : denies dysuria, hematuria, incontinence or retention                                                                                           MUSKULOSKELETAL:  denies joint swelling or muscle weakness  PSYCH:  denies dementia, depresion, anxiety   ENDOCRINE:  cold intolerance[ ] heat intolerance[ ] polydipsia[ ]                                                                                                                                                                                                PHYSICAL EXAM  Vital Signs Last 24 Hrs  T(C): 36.2 (20 Jul 2020 12:00), Max: 36.8 (19 Jul 2020 16:00)  T(F): 97.1 (20 Jul 2020 12:00), Max: 98.3 (19 Jul 2020 16:00)  HR: 60 (20 Jul 2020 12:00) (58 - 62)  BP: 96/64 (20 Jul 2020 12:00) (88/72 - 126/77)  BP(mean): 103 (20 Jul 2020 08:55) (69 - 103)  RR: 18 (20 Jul 2020 12:00) (16 - 30)  SpO2: 99% (20 Jul 2020 12:00) (98% - 99%)  Right arm bp: Left arm bp;    CONSTITUTIONAL:    well nourished, well developed, overweight in NAD                                                                       Neuro: oriented to person/place & time with no focal motor or sensory  deficits     Eyes: PERRLA, EOMI, no nystagmus, sclera anicteric  ENT:  nasal/oral mucosa with absence of cyanosis, fair dentition  Neck: no jugular vein distention, trachea midline, no goiter   Chest: bilatertal breath sounds with good air movement absence of wheezes, rales, or rhonchi                                                                           CV:  RSR, S1S2, no significant murmur appreciated  Carotids: No Bruits  GI:  soft, non-tender non-distended, + bowel sounds                                                                                                          Extremities:  no evidence of cyanosis or deformity, no pedal edema, burn left calf  Extremity Pulses: right / left:  femorals 2+/ 2+; DP's 2+/2+; radials 2+/2+  SKIN : no rashes                                                          LABS:                        14.1   7.35  )-----------( 204      ( 20 Jul 2020 04:23 )             43.0     07-20    139  |  104  |  15  ----------------------------<  109<H>  4.6   |  23  |  1.0    Ca    8.8      20 Jul 2020 04:23  Mg     1.9     07-20    Xray Chest 1 View- PORTABLE-Routine (07.19.20 @ 09:21) >  Impression:      No radiographic evidence of acute cardiopulmonary disease.    Support devices as described. Without change.     NM Nuclear Stress Pharmacologic Multiple (07.17.20 @ 16:59) >  1. NO DEFINITE EVIDENCE FOR ISCHEMIA DURING ADENOSINE INFUSION.   2. MILD LEFT VENTRICULAR GLOBAL HYPOKINESIS. ALL WALLS OF THE LEFT VENTRICLE THICKEN  3. LEFT VENTRICULAR EJECTION FRACTION OF  40 % WHICH IS LOW.      12 Lead ECG (07.20.20 @ 05:06) >  Ventricular Rate 60 BPM    Atrial Rate 60 BPM    QRS Duration 162 ms    Q-T Interval 492 ms    QTC Calculation(Bezet) 492 ms    R Axis -63 degrees    T Axis 91 degrees    Diagnosis Line Ventricular-paced rhythm  Abnormal ECG     Transthoracic Echocardiogram (07.17.20 @ 08:17) >  Left Ventricle: The left ventricular internal cavity size is normal. Left ventricular wall thickness is normal. Global LV systolic function was normal. Normal segmental left ventricular systolic function.       LV Wall Scoring:  All segments are normal.    Right Ventricle: Normal right ventricular size and function.  Left Atrium: Mildly enlarged left atrium.  Right Atrium: Normal right atrial size.  Pericardium: There is no evidence of pericardial effusion.  Mitral Valve: The mitral valve is normal in structure. Trace mitral valve regurgitation is seen.  Tricuspid Valve: The tricuspid valve is normal in structure. Mild tricuspid regurgitation is visualized.  Aortic Valve: The aortic valve is trileaflet. No evidence of aortic stenosis. No evidence of aortic valve regurgitation is seen.  Aorta: The aortic root is normal in size and structure.    COVID-19: NotDetec (07-16-20 @ 12:20)      Assessment/ Plan:    50 year old M with PMHx of cocaine-induced neutropenia, substance abuse (cocaine), presented to ED for 3 days of dyspnea and chest heaviness now stable in the ICU s/p TVP for 2nd degree block will  go for PPM  placement.     PLAN  #) Advanced 2:1 AV Block presented with Dyspnea/chest heaviness (now resolved)  - s/p TVP in ED  -- PPM placement today with plan for cardiac MRI at a later date  - keep NPO  - Patient seen and examined by Dr. Jordan, note to follow

## 2020-07-20 NOTE — PROGRESS NOTE ADULT - SUBJECTIVE AND OBJECTIVE BOX
SUBJECTIVE:    Patient is a 50y old  Male who presents with a chief complaint of dizziness (19 Jul 2020 12:19)    Currently admitted to medicine with the primary diagnosis of Complete heart block     Today is hospital day 4d. This morning he is resting comfortably in bed and reports no new issues or overnight events. Patient is still pacer dependent     PAST MEDICAL & SURGICAL HISTORY  PAST MEDICAL & SURGICAL HISTORY:  History of bunionectomy  Neutropenia (cocaine induced?)  Illicit drug use: quit  Alcoholism: quit 1 year ago    SOCIAL HISTORY:    ALLERGIES:  No Known Allergies    MEDICATIONS:  STANDING MEDICATIONS  chlorhexidine 4% Liquid 1 Application(s) Topical <User Schedule>  heparin   Injectable 5000 Unit(s) SubCutaneous every 8 hours  melatonin 5 milliGRAM(s) Oral at bedtime    PRN MEDICATIONS  acetaminophen   Tablet .. 650 milliGRAM(s) Oral every 6 hours PRN  ALBUTerol    90 MICROgram(s) HFA Inhaler 2 Puff(s) Inhalation every 6 hours PRN    VITALS:   T(F): 98  HR: 62  BP: 95/70  RR: 29  SpO2: 98%    LABS:                        14.1   7.35  )-----------( 204      ( 20 Jul 2020 04:23 )             43.0     07-20    139  |  104  |  15  ----------------------------<  109<H>  4.6   |  23  |  1.0    Ca    8.8      20 Jul 2020 04:23  Mg     1.9     07-20    Culture - Blood (collected 17 Jul 2020 21:02)  Source: .Blood None  Preliminary Report (19 Jul 2020 04:52):    No growth to date.      RADIOLOGY:  TTE:  < from: Transthoracic Echocardiogram (07.17.20 @ 08:17) >  Summary:   1. Normal global left ventricular systolic function.    PHYSICIAN INTERPRETATION:  Left Ventricle: The left ventricular internal cavity size is normal. Left ventricular wall thickness is normal. Global LV systolic function was normal. Normal segmental left ventricular systolic function.       LV Wall Scoring:  All segments are normal.    Right Ventricle: Normal right ventricular size and function.  Left Atrium: Mildly enlarged left atrium.  Right Atrium: Normal right atrial size.  Pericardium: There is no evidence of pericardial effusion.  Mitral Valve: The mitral valve is normal in structure. Trace mitral valve regurgitation is seen.  Tricuspid Valve: The tricuspid valve is normal in structure. Mild tricuspid regurgitation is visualized.  Aortic Valve: The aortic valve is trileaflet. No evidence of aortic stenosis. No evidence of aortic valve regurgitation is seen.  Aorta: The aortic root is normal in size and structure.    < end of copied text >      PHYSICAL EXAM:  GEN: No acute distress  NECK: Right TVP in place  LUNGS: Clear to auscultation bilaterally   HEART: S1/S2 present. RRR  ABD: Soft, non-tender, non-distended  EXT: No KUMAR  NEURO: [id number]

## 2020-07-20 NOTE — PROGRESS NOTE ADULT - SUBJECTIVE AND OBJECTIVE BOX
LENGTH OF HOSPITAL STAY: 4d      CHIEF COMPLAINT:   Patient is a 50y old  Male who presents with a chief complaint of 2nd degree heart block (20 Jul 2020 11:18)      OVER Past 24hrs:  The patient was seen and examined at bedside there were no acute overnight events.  Pt complains of occasional lightheadedness and wheezes.      HISTORY OF PRESENTING ILLNESS:    HPI:  50 year old M with PMHx of cocaine-induced neutropenia, substance abuse (cocaine), presented to ED for 3 days of dyspnea and chest heaviness.  Patient states that for the last three days he has been feeling dizzy and light headed when he ambulates. He  has also been feeling short of breath for the past 6 months which he attributes to bronchitis as he is an active smoker.  He stated that he felt more dizzy when he was ambulating, worse when he went up the stairs.  He also states that for the past 3 days he had chest heaviness that radiated to his back, moderate in intensity with no alleviating or aggrevating factors. Patient went to urgent care to get an inhaler for his shortness of breath and they sent him to the ED for abnormal EKG. Patient found to be second degree heart block on EKG on presentation. TVP was placed with improvement of symptoms. He states that he drinks 3 red bulls every morning with one cup of coffee to get energy. He denies any exposure to the outdoors, tick bites, denies diarrhea, constipation, hot or cold intolerance, fever or chills.     T(C):   HR: 80 (07-16-20 @ 11:24) (54 - 80)  BP: 118/70 (07-16-20 @ 11:24) (114/90 - 118/70)  RR: 18 (07-16-20 @ 11:24) (17 - 18)  SpO2: 100% (07-16-20 @ 11:24) (98% - 100%) (16 Jul 2020 11:59)    PAST MEDICAL & SURGICAL HISTORY  PAST MEDICAL & SURGICAL HISTORY:  History of bunionectomy  Neutropenia: pt denies  Illicit drug use: pt denies  Alcoholism: clean 1 yr  No significant past surgical history        REVIEW OF SYSTEMS  CONSTITUTIONAL: No fever, weight loss, or fatigue  EYES: No eye pain, visual disturbances, or discharge  ENMT:  No difficulty hearing, tinnitus, vertigo; No sinus or throat pain  NECK: No pain or stiffness  RESPIRATORY: No cough, wheezing, chills or hemoptysis; No shortness of breath  CARDIOVASCULAR: No chest pain, palpitations, dizziness, or leg swelling  GASTROINTESTINAL: No abdominal or epigastric pain. No nausea, vomiting, or hematemesis; No diarrhea or constipation. No melena or hematochezia.  GENITOURINARY: No dysuria, frequency, hematuria, or incontinence  NEUROLOGICAL: No headaches, memory loss, loss of strength, numbness, or tremors  SKIN: No itching, burning, rashes, or lesions   LYMPH NODES: No enlarged glands  ENDOCRINE: No heat or cold intolerance; No hair loss  MUSCULOSKELETAL: No joint pain or swelling; No muscle, back, or extremity pain  PSYCHIATRIC: No depression, anxiety, mood swings, or difficulty sleeping  HEME/LYMPH: No easy bruising, or bleeding gums  ALLERY AND IMMUNOLOGIC: No hives or eczema    ALLERGIES:  No Known Allergies    MEDICATIONS:  STANDING MEDICATIONS  chlorhexidine 4% Liquid 1 Application(s) Topical <User Schedule>  melatonin 5 milliGRAM(s) Oral at bedtime    PRN MEDICATIONS  acetaminophen   Tablet .. 650 milliGRAM(s) Oral every 6 hours PRN  ALBUTerol    90 MICROgram(s) HFA Inhaler 2 Puff(s) Inhalation every 6 hours PRN    VITALS:   T(F): 96.9  HR: 60  BP: 108/81  RR: 16  SpO2: 99%    PHYSICAL EXAM:  General: No acute distress.  Alert, oriented, interactive, nonfocal.    HEENT: Pupils equal, reactive to light symmetrically.    PULM: Clear to auscultation bilaterally, no significant sputum production.    CVS: Regular rate and rhythm, no murmurs, rubs, or gallops.    GI: Soft, nondistended, nontender, no masses.    MSK: No edema, nontender.    SKIN: Warm and well perfused, no rashes noted.    PSYCH:     NEURO:    LABS:                        14.1   7.35  )-----------( 204      ( 20 Jul 2020 04:23 )             43.0     07-20    139  |  104  |  15  ----------------------------<  109<H>  4.6   |  23  |  1.0    Ca    8.8      20 Jul 2020 04:23  Mg     1.9     07-20                Culture - Blood (collected 17 Jul 2020 21:02)  Source: .Blood None  Preliminary Report (19 Jul 2020 04:52):    No growth to date.          RADIOLOGY:    < from: Xray Chest 1 View- PORTABLE-Routine (07.19.20 @ 09:21) >  Impression:      No radiographic evidence of acute cardiopulmonary disease.    Support devices as described. Without change.       NM Stress Test 7/17, Impression:   1. NO DEFINITE EVIDENCE FOR ISCHEMIA DURING ADENOSINE INFUSION.   2. MILD LEFT VENTRICULAR GLOBAL HYPOKINESIS. ALL WALLS OF THE LEFT VENTRICLE THICKEN  3. LEFT VENTRICULAR EJECTION FRACTION OF  40 % WHICH IS LOW.       ECHO 7/17:   Summary:   1. Normal global left ventricular systolic function.    PHYSICIAN INTERPRETATION:  Left Ventricle: The left ventricular internal cavity size is normal. Left ventricular wall thickness is normal. Global LV systolic function was normal. Normal segmental left ventricular systolic function.

## 2020-07-20 NOTE — PROGRESS NOTE ADULT - ASSESSMENT
50 year old M with PMHx of cocaine-induced neutropenia, substance abuse (cocaine), presented to ED for 3 days of dyspnea and chest heaviness now stable in the ICU s/p TVP pending w/u for 2nd degree block with possible PPM or ICM placement.     PLAN  #) Advanced 2:1 AV Block presented with Dyspnea/chest heaviness (now resolved)  - s/p TVP in ED  - ECHO: reduced EF 40%, Normal LV fx  - Nuclear MPI: No evidence of ischemia  - Lyme serology negative  - TSH normal  - Keep Mg >2, K >4.0  -- PPM placement today and cardiac MRI after    #) Neutropenia Hx - cocaine-induced, now resolved  -outpatient heme follow up     #) Hx of cocaine abuse  - pt currently denies drug abuse  - Urine tox negative      DVT ppx: Heparin subq 5000  GI ppx: Not indicated  Diet: DASH  Activity: Increase as tolerated  Code Status: FULL CODE

## 2020-07-20 NOTE — PRE-ANESTHESIA EVALUATION ADULT - NSANTHPMHFT_GEN_ALL_CORE
50 year old M with PMHx of cocaine-induced neutropenia, substance abuse (cocaine), presented to ED for 3 days of dyspnea and chest heaviness.  Patient stated that for the last three days PTA he has been feeling dizzy and light headed when he ambulates. He  has also been feeling short of breath for the past 6 months which he attributes to bronchitis as he is an active smoker.  He stated that he felt more dizzy when he was ambulating, worse when he went up the stairs.  He also states that he had chest heaviness that radiated to his back, moderate in intensity with no alleviating or aggrevating factors 3 days PTA. Patient went to urgent care to get an inhaler for his shortness of breath and they sent him to the ED for abnormal EKG. Patient found to be second degree heart block on EKG on presentation. TVP was placed with improvement of symptoms. Patient evaluated for reversible causes of bradycardia and found to have none. Referred for ppm inplant

## 2020-07-20 NOTE — PROGRESS NOTE ADULT - ASSESSMENT
49 y/o man with PMH of cocaine and alcohol abuse (quit both) presented to the ED from urgent care for 3 days of dyspnea, chest heaviness and dizziness.  He was found with second degree heart block, TVP was placed and he was admitted to CCU.     1. Advanced AV block requiring TVP  CCU monitoring  workup for reversible causes so far negative  TSH WNL and lyme titers negative  nuclear stress test without ischemia but EF 40%  ECHO with normal EF  pacemaker placement today and then cardiac MRI  cardio and EP f/u appreciated  guarded prognosis  full code status    2. H/O cocaine abuse  urine drug screen negative    3. Tobacco abuse  smoking cessation    4. DVT prophylaxis      PROGRESS NOTE HANDOFF    Pending: pacemaker placement and then cardiac MRI    Disposition: home

## 2020-07-20 NOTE — PRE-ANESTHESIA EVALUATION ADULT - NSRADCARDRESULTSFT_GEN_ALL_CORE
1. Normal global left ventricular systolic function.    PHYSICIAN INTERPRETATION:  Left Ventricle: The left ventricular internal cavity size is normal. Left ventricular wall thickness is normal. Global LV systolic function was normal. Normal segmental left ventricular systolic function.       LV Wall Scoring:  All segments are normal.    Right Ventricle: Normal right ventricular size and function.  Left Atrium: Mildly enlarged left atrium.  Right Atrium: Normal right atrial size.  Pericardium: There is no evidence of pericardial effusion.  Mitral Valve: The mitral valve is normal in structure. Trace mitral valve regurgitation is seen.  Tricuspid Valve: The tricuspid valve is normal in structure. Mild tricuspid regurgitation is visualized.  Aortic Valve: The aortic valve is trileaflet. No evidence of aortic stenosis. No evidence of aortic valve regurgitation is seen.  Aorta: The aortic root is normal in size and structure.

## 2020-07-20 NOTE — CONSULT NOTE ADULT - ASSESSMENT
CTS Attending    Patient interviewed and examined  Case discussed with Dr. Melendez  Pt. referred for DDD PPM implantation  Procedure, risks, benefits and alternatives explained and patient agreed to proceed with ppm implant today    -R CTS Attending    Patient interviewed and examined  Case discussed with Dr. Melendez  Pt. referred for DDD PPM implantation  Procedure, risks, benefits and alternatives explained and patient agreed to proceed with ppm implant today    -FMR    Addendum: Patient has an indwelling transvenous temporary electrode, and should have surgery today, so the decision for surgery is based also on the urgent need to remove a potential source of infection and complications.    -FMR

## 2020-07-20 NOTE — PROGRESS NOTE ADULT - SUBJECTIVE AND OBJECTIVE BOX
Patient is a 50y old  Male who presents with a chief complaint of complete heart block (20 Jul 2020 08:31)    HPI:  50 year old M with PMHx of cocaine-induced neutropenia, substance abuse (cocaine), presented to ED for 3 days of dyspnea and chest heaviness.  Patient states that for the last three days he has been feeling dizzy and light headed when he ambulates. He  has also been feeling short of breath for the past 6 months which he attributes to bronchitis as he is an active smoker.  He stated that he felt more dizzy when he was ambulating, worse when he went up the stairs.  He also states that for the past 3 days he had chest heaviness that radiated to his back, moderate in intensity with no alleviating or aggrevating factors. Patient went to urgent care to get an inhaler for his shortness of breath and they sent him to the ED for abnormal EKG. Patient found to be second degree heart block on EKG on presentation. TVP was placed with improvement of symptoms. He states that he drinks 3 red bulls every morning with one cup of coffee to get energy. He denies any exposure to the outdoors, tick bites, denies diarrhea, constipation, hot or cold intolerance, fever or chills.     T(C):   HR: 80 (07-16-20 @ 11:24) (54 - 80)  BP: 118/70 (07-16-20 @ 11:24) (114/90 - 118/70)  RR: 18 (07-16-20 @ 11:24) (17 - 18)  SpO2: 100% (07-16-20 @ 11:24) (98% - 100%) (16 Jul 2020 11:59)      SUBJ:  Patient seen and examined.      MEDICATIONS  (STANDING):  chlorhexidine 4% Liquid 1 Application(s) Topical <User Schedule>  heparin   Injectable 5000 Unit(s) SubCutaneous every 8 hours  melatonin 5 milliGRAM(s) Oral at bedtime    MEDICATIONS  (PRN):  acetaminophen   Tablet .. 650 milliGRAM(s) Oral every 6 hours PRN Mild Pain (1 - 3)  ALBUTerol    90 MICROgram(s) HFA Inhaler 2 Puff(s) Inhalation every 6 hours PRN Shortness of Breath and/or Wheezing            Vital Signs Last 24 Hrs  T(C): 36.7 (19 Jul 2020 20:00), Max: 36.8 (19 Jul 2020 16:00)  T(F): 98 (19 Jul 2020 20:00), Max: 98.3 (19 Jul 2020 16:00)  HR: 62 (20 Jul 2020 06:00) (58 - 62)  BP: 95/70 (20 Jul 2020 06:00) (88/72 - 126/77)  BP(mean): 78 (20 Jul 2020 06:00) (67 - 91)  RR: 29 (20 Jul 2020 06:00) (18 - 32)  SpO2: 98% (19 Jul 2020 20:00) (98% - 98%)      PHYSICAL EXAM:    GEN: AAO x 3, NAD  HEENT: NC/AT, PERRL  Neck: No JVD, no bruits  CV: Reg, S1-S2, no murmur  Lungs: CTAB  Abd: Soft, non-tender  Ext: No edema      I&O's Summary    19 Jul 2020 07:01  -  20 Jul 2020 07:00  --------------------------------------------------------  IN: 580 mL / OUT: 800 mL / NET: -220 mL    	    TELEMETRY:    ECG:  < from: 12 Lead ECG (07.20.20 @ 05:06) >  Ventricular-paced rhythm  Abnormal ECG    < end of copied text >    TTE:  < from: Transthoracic Echocardiogram (07.17.20 @ 08:17) >  Summary:   1. Normal global left ventricular systolic function.    PHYSICIAN INTERPRETATION:  Left Ventricle: The left ventricular internal cavity size is normal. Left ventricular wall thickness is normal. Global LV systolic function was normal. Normal segmental left ventricular systolic function.       LV Wall Scoring:  All segments are normal.    Right Ventricle: Normal right ventricular size and function.  Left Atrium: Mildly enlarged left atrium.  Right Atrium: Normal right atrial size.  Pericardium: There is no evidence of pericardial effusion.  Mitral Valve: The mitral valve is normal in structure. Trace mitral valve regurgitation is seen.  Tricuspid Valve: The tricuspid valve is normal in structure. Mild tricuspid regurgitation is visualized.  Aortic Valve: The aortic valve is trileaflet. No evidence of aortic stenosis. No evidence of aortic valve regurgitation is seen.  Aorta: The aortic root is normal in size and structure.       < end of copied text >      NUCLEAR:    < from: NM Nuclear Stress Pharmacologic Multiple (07.17.20 @ 16:59) >  Impression:   1. NO DEFINITE EVIDENCE FOR ISCHEMIA DURING ADENOSINE INFUSION.   2. MILD LEFT VENTRICULAR GLOBAL HYPOKINESIS. ALL WALLS OF THE LEFT VENTRICLE THICKEN  3. LEFT VENTRICULAR EJECTION FRACTION OF  40 % WHICH IS LOW.     < end of copied text >      LABS:                        14.1   7.35  )-----------( 204      ( 20 Jul 2020 04:23 )             43.0     07-20    139  |  104  |  15  ----------------------------<  109<H>  4.6   |  23  |  1.0    Ca    8.8      20 Jul 2020 04:23  Mg     1.9     07-20                BNP  RADIOLOGY & ADDITIONAL STUDIES:      IMPRESSION AND PLAN:

## 2020-07-20 NOTE — PROGRESS NOTE ADULT - ASSESSMENT
Advanced AV block  No reversible cause identified  No ischemia on stress test, but EF is mildly decreased.  Preserved EF by echo.    C/w TVP  EP following for PPM placement  DVT prophylaxis

## 2020-07-20 NOTE — PROGRESS NOTE ADULT - SUBJECTIVE AND OBJECTIVE BOX
ADRIANNA GARZA  50y Male    INTERVAL HPI/OVERNIGHT EVENTS:    NO complaints.   For pacemaker placement and then cardiac MRI per EP note.    T(F): 96.9 (20 @ 10:10), Max: 98.3 (20 @ 16:00)  HR: 60 (20 @ 10:10) (58 - 62)  BP: 108/81 (20 @ 10:10) (88/72 - 126/77)  RR: 29 (20 @ 06:00) (18 - 32)  SpO2: 99% (20 @ 10:10) (98% - 99%) on RA    I&O's Summary    2020 07:01  -  2020 07:00  --------------------------------------------------------  IN: 580 mL / OUT: 800 mL / NET: -220 mL    2020 07:01  -  2020 11:19  --------------------------------------------------------  IN: 0 mL / OUT: 200 mL / NET: -200 mL        Daily Height in cm: 111.3 (2020 10:10)    Daily Weight in k.4 (2020 06:00)      PHYSICAL EXAM:  GENERAL: NAD  HEAD:  Normocephalic  EYES:  conjunctiva and sclera clear  ENMT: Moist mucous membranes  NECK: Supple, + TVP  NERVOUS SYSTEM:  Alert & Oriented X3, Good concentration  CHEST/LUNG: Clear to percussion bilaterally; No rales, rhonchi, wheezing  HEART: Regular rate and rhythm  ABDOMEN: Soft, Nontender, Nondistended  EXTREMITIES:  No edema  SKIN: No rashes     Consultant(s) Notes Reviewed:  [x ] YES  [ ] NO  Care Discussed with Consultants/Other Providers [ x] YES  [ ] NO    MEDICATIONS  (STANDING):  chlorhexidine 4% Liquid 1 Application(s) Topical <User Schedule>  melatonin 5 milliGRAM(s) Oral at bedtime    MEDICATIONS  (PRN):  acetaminophen   Tablet .. 650 milliGRAM(s) Oral every 6 hours PRN Mild Pain (1 - 3)  ALBUTerol    90 MICROgram(s) HFA Inhaler 2 Puff(s) Inhalation every 6 hours PRN Shortness of Breath and/or Wheezing      Telemetry reviewed    LABS:                        14.1   7.35  )-----------( 204      ( 2020 04:23 )             43.0     07-20    139  |  104  |  15  ----------------------------<  109<H>  4.6   |  23  |  1.0    Ca    8.8      2020 04:23  Mg     1.9     07-20              Culture - Blood (collected 2020 21:02)  Source: .Blood None  Preliminary Report (2020 04:52):    No growth to date.          Case discussed with resident    Care discussed with pt

## 2020-07-21 ENCOUNTER — TRANSCRIPTION ENCOUNTER (OUTPATIENT)
Age: 50
End: 2020-07-21

## 2020-07-21 VITALS
HEART RATE: 64 BPM | TEMPERATURE: 98 F | RESPIRATION RATE: 19 BRPM | OXYGEN SATURATION: 99 % | DIASTOLIC BLOOD PRESSURE: 82 MMHG | SYSTOLIC BLOOD PRESSURE: 131 MMHG

## 2020-07-21 LAB
ALBUMIN SERPL ELPH-MCNC: 4.3 G/DL — SIGNIFICANT CHANGE UP (ref 3.5–5.2)
ALP SERPL-CCNC: 103 U/L — SIGNIFICANT CHANGE UP (ref 30–115)
ALT FLD-CCNC: 29 U/L — SIGNIFICANT CHANGE UP (ref 0–41)
ANION GAP SERPL CALC-SCNC: 15 MMOL/L — HIGH (ref 7–14)
AST SERPL-CCNC: 31 U/L — SIGNIFICANT CHANGE UP (ref 0–41)
BASOPHILS # BLD AUTO: 0.07 K/UL — SIGNIFICANT CHANGE UP (ref 0–0.2)
BASOPHILS NFR BLD AUTO: 0.9 % — SIGNIFICANT CHANGE UP (ref 0–1)
BILIRUB SERPL-MCNC: 0.3 MG/DL — SIGNIFICANT CHANGE UP (ref 0.2–1.2)
BUN SERPL-MCNC: 16 MG/DL — SIGNIFICANT CHANGE UP (ref 10–20)
CALCIUM SERPL-MCNC: 9.2 MG/DL — SIGNIFICANT CHANGE UP (ref 8.5–10.1)
CHLORIDE SERPL-SCNC: 98 MMOL/L — SIGNIFICANT CHANGE UP (ref 98–110)
CO2 SERPL-SCNC: 23 MMOL/L — SIGNIFICANT CHANGE UP (ref 17–32)
CREAT SERPL-MCNC: 1 MG/DL — SIGNIFICANT CHANGE UP (ref 0.7–1.5)
EOSINOPHIL # BLD AUTO: 0.17 K/UL — SIGNIFICANT CHANGE UP (ref 0–0.7)
EOSINOPHIL NFR BLD AUTO: 2.1 % — SIGNIFICANT CHANGE UP (ref 0–8)
GLUCOSE SERPL-MCNC: 93 MG/DL — SIGNIFICANT CHANGE UP (ref 70–99)
HCT VFR BLD CALC: 45.8 % — SIGNIFICANT CHANGE UP (ref 42–52)
HGB BLD-MCNC: 15.1 G/DL — SIGNIFICANT CHANGE UP (ref 14–18)
IMM GRANULOCYTES NFR BLD AUTO: 0.6 % — HIGH (ref 0.1–0.3)
LYMPHOCYTES # BLD AUTO: 1.39 K/UL — SIGNIFICANT CHANGE UP (ref 1.2–3.4)
LYMPHOCYTES # BLD AUTO: 17.2 % — LOW (ref 20.5–51.1)
MAGNESIUM SERPL-MCNC: 2.1 MG/DL — SIGNIFICANT CHANGE UP (ref 1.8–2.4)
MCHC RBC-ENTMCNC: 28.6 PG — SIGNIFICANT CHANGE UP (ref 27–31)
MCHC RBC-ENTMCNC: 33 G/DL — SIGNIFICANT CHANGE UP (ref 32–37)
MCV RBC AUTO: 86.7 FL — SIGNIFICANT CHANGE UP (ref 80–94)
MONOCYTES # BLD AUTO: 0.69 K/UL — HIGH (ref 0.1–0.6)
MONOCYTES NFR BLD AUTO: 8.6 % — SIGNIFICANT CHANGE UP (ref 1.7–9.3)
NEUTROPHILS # BLD AUTO: 5.69 K/UL — SIGNIFICANT CHANGE UP (ref 1.4–6.5)
NEUTROPHILS NFR BLD AUTO: 70.6 % — SIGNIFICANT CHANGE UP (ref 42.2–75.2)
NRBC # BLD: 0 /100 WBCS — SIGNIFICANT CHANGE UP (ref 0–0)
PLATELET # BLD AUTO: 205 K/UL — SIGNIFICANT CHANGE UP (ref 130–400)
POTASSIUM SERPL-MCNC: 4.7 MMOL/L — SIGNIFICANT CHANGE UP (ref 3.5–5)
POTASSIUM SERPL-SCNC: 4.7 MMOL/L — SIGNIFICANT CHANGE UP (ref 3.5–5)
PROT SERPL-MCNC: 7.3 G/DL — SIGNIFICANT CHANGE UP (ref 6–8)
RBC # BLD: 5.28 M/UL — SIGNIFICANT CHANGE UP (ref 4.7–6.1)
RBC # FLD: 13.1 % — SIGNIFICANT CHANGE UP (ref 11.5–14.5)
SODIUM SERPL-SCNC: 136 MMOL/L — SIGNIFICANT CHANGE UP (ref 135–146)
WBC # BLD: 8.06 K/UL — SIGNIFICANT CHANGE UP (ref 4.8–10.8)
WBC # FLD AUTO: 8.06 K/UL — SIGNIFICANT CHANGE UP (ref 4.8–10.8)

## 2020-07-21 PROCEDURE — 93280 PM DEVICE PROGR EVAL DUAL: CPT | Mod: 26

## 2020-07-21 PROCEDURE — 99238 HOSP IP/OBS DSCHRG MGMT 30/<: CPT

## 2020-07-21 PROCEDURE — 99232 SBSQ HOSP IP/OBS MODERATE 35: CPT

## 2020-07-21 PROCEDURE — 93010 ELECTROCARDIOGRAM REPORT: CPT

## 2020-07-21 PROCEDURE — 71045 X-RAY EXAM CHEST 1 VIEW: CPT | Mod: 26

## 2020-07-21 RX ORDER — CEPHALEXIN 500 MG
1 CAPSULE ORAL
Qty: 10 | Refills: 0
Start: 2020-07-21 | End: 2020-07-25

## 2020-07-21 RX ORDER — ALBUTEROL 90 UG/1
2 AEROSOL, METERED ORAL
Qty: 0 | Refills: 0 | DISCHARGE
Start: 2020-07-21

## 2020-07-21 RX ORDER — HEPARIN SODIUM 5000 [USP'U]/ML
5000 INJECTION INTRAVENOUS; SUBCUTANEOUS EVERY 8 HOURS
Refills: 0 | Status: DISCONTINUED | OUTPATIENT
Start: 2020-07-21 | End: 2020-07-21

## 2020-07-21 RX ADMIN — CHLORHEXIDINE GLUCONATE 1 APPLICATION(S): 213 SOLUTION TOPICAL at 05:31

## 2020-07-21 RX ADMIN — HEPARIN SODIUM 5000 UNIT(S): 5000 INJECTION INTRAVENOUS; SUBCUTANEOUS at 05:31

## 2020-07-21 NOTE — DISCHARGE NOTE NURSING/CASE MANAGEMENT/SOCIAL WORK - PATIENT PORTAL LINK FT
You can access the FollowMyHealth Patient Portal offered by [facility] by registering at the following website: [email or web address]. By joining [facility]’s FollowMyHealth portal, you will also be able to view your health information using other applications (apps) compatible with our system.

## 2020-07-21 NOTE — DISCHARGE NOTE PROVIDER - CARE PROVIDER_API CALL
Rosalio Orellana)  Cardiology; Internal Medicine  [address] [place] [address]  Phone: [phone]  Fax: [phone]  Follow Up Time: 1 month    Gibran Jordan  SURGERY  [address] [place] [address]  Phone: [phone]  Fax: [phone]  Follow Up Time:

## 2020-07-21 NOTE — PROGRESS NOTE ADULT - ASSESSMENT
51 y/o man with PMH of cocaine and alcohol abuse (quit both) presented to the ED from urgent care for 3 days of dyspnea, chest heaviness and dizziness.  He was found with second degree heart block, TVP was placed and he was admitted to CCU.     1. Advanced AV block   s/p TVP placement in ED  now s/p DDD pacemaker 7/20  s/p interrogation today  no pneumothorax on CXR  5 days of prophylactic abx on discharge  outpt f/u with EP  cardiac MRI after 6 weeks as outpt  workup for reversible causes was negative  TSH WNL and lyme titers negative  nuclear stress test without ischemia but EF 40%  ECHO with normal EF    2. H/O cocaine abuse  urine drug screen negative    3. Tobacco abuse  smoking cessation      Discharge meds reviewed with the resident    Pt does not have PMD - given appt for medical clinic at [facility] on Tuesday, July 28th at 8:30AM

## 2020-07-21 NOTE — PROGRESS NOTE ADULT - REASON FOR ADMISSION
2nd degree heart block
AV block
complete heart block
2nd degree heart block
2nd degree heart block

## 2020-07-21 NOTE — PROGRESS NOTE ADULT - SUBJECTIVE AND OBJECTIVE BOX
Patient is a 50y old  Male who presents with a chief complaint of 2nd degree heart block (20 Jul 2020 12:55)    HPI:  50 year old M with PMHx of cocaine-induced neutropenia, substance abuse (cocaine), presented to ED for 3 days of dyspnea and chest heaviness.  Patient states that for the last three days he has been feeling dizzy and light headed when he ambulates. He  has also been feeling short of breath for the past 6 months which he attributes to bronchitis as he is an active smoker.  He stated that he felt more dizzy when he was ambulating, worse when he went up the stairs.  He also states that for the past 3 days he had chest heaviness that radiated to his back, moderate in intensity with no alleviating or aggrevating factors. Patient went to urgent care to get an inhaler for his shortness of breath and they sent him to the ED for abnormal EKG. Patient found to be second degree heart block on EKG on presentation. TVP was placed with improvement of symptoms. He states that he drinks 3 red bulls every morning with one cup of coffee to get energy. He denies any exposure to the outdoors, tick bites, denies diarrhea, constipation, hot or cold intolerance, fever or chills.     T(C):   HR: 80 (07-16-20 @ 11:24) (54 - 80)  BP: 118/70 (07-16-20 @ 11:24) (114/90 - 118/70)  RR: 18 (07-16-20 @ 11:24) (17 - 18)  SpO2: 100% (07-16-20 @ 11:24) (98% - 100%) (16 Jul 2020 11:59)      SUBJ:  Patient seen and examined. No new events. S/p PPM.      MEDICATIONS  (STANDING):  chlorhexidine 4% Liquid 1 Application(s) Topical <User Schedule>  heparin   Injectable 5000 Unit(s) SubCutaneous every 8 hours  melatonin 5 milliGRAM(s) Oral at bedtime    MEDICATIONS  (PRN):  acetaminophen   Tablet .. 650 milliGRAM(s) Oral every 6 hours PRN Mild Pain (1 - 3)  ALBUTerol    90 MICROgram(s) HFA Inhaler 2 Puff(s) Inhalation every 6 hours PRN Shortness of Breath and/or Wheezing            Vital Signs Last 24 Hrs  T(C): 37 (21 Jul 2020 04:00), Max: 37 (21 Jul 2020 04:00)  T(F): 98.6 (21 Jul 2020 04:00), Max: 98.6 (21 Jul 2020 04:00)  HR: 68 (21 Jul 2020 06:00) (60 - 74)  BP: 126/84 (21 Jul 2020 06:00) (96/64 - 126/84)  BP(mean): 96 (21 Jul 2020 06:00) (80 - 103)  RR: 27 (21 Jul 2020 06:00) (16 - 27)  SpO2: 98% (21 Jul 2020 06:00) (96% - 99%)      PHYSICAL EXAM:    GEN: AAO x 3, NAD  HEENT: NC/AT, PERRL  Neck: No JVD, no bruits  CV: Reg, S1-S2, no murmur  Lungs: CTAB  Abd: Soft, non-tender  Ext: No edema      I&O's Summary    20 Jul 2020 07:01  -  21 Jul 2020 07:00  --------------------------------------------------------  IN: 0 mL / OUT: 650 mL / NET: -650 mL    	    TELEMETRY:    ECG:    TTE:  < from: Transthoracic Echocardiogram (07.17.20 @ 08:17) >  Summary:   1. Normal global left ventricular systolic function.    PHYSICIAN INTERPRETATION:  Left Ventricle: The left ventricular internal cavity size is normal. Left ventricular wall thickness is normal. Global LV systolic function was normal. Normal segmental left ventricular systolic function.       LV Wall Scoring:  All segments are normal.    Right Ventricle: Normal right ventricular size and function.  Left Atrium: Mildly enlarged left atrium.  Right Atrium: Normal right atrial size.  Pericardium: There is no evidence of pericardial effusion.  Mitral Valve: The mitral valve is normal in structure. Trace mitral valve regurgitation is seen.  Tricuspid Valve: The tricuspid valve is normal in structure. Mild tricuspid regurgitation is visualized.  Aortic Valve: The aortic valve is trileaflet. No evidence of aortic stenosis. No evidence of aortic valve regurgitation is seen.  Aorta: The aortic root is normal in size and structure.    < end of copied text >      LABS:                        15.1   8.06  )-----------( 205      ( 21 Jul 2020 04:17 )             45.8     07-21    136  |  98  |  16  ----------------------------<  93  4.7   |  23  |  1.0    Ca    9.2      21 Jul 2020 04:17  Mg     2.1     07-21    TPro  7.3  /  Alb  4.3  /  TBili  0.3  /  DBili  x   /  AST  31  /  ALT  29  /  AlkPhos  103  07-21              BNP  RADIOLOGY & ADDITIONAL STUDIES:      IMPRESSION AND PLAN:

## 2020-07-21 NOTE — DISCHARGE NOTE PROVIDER - NSDCFUADDAPPT_GEN_ALL_CORE_FT
[facility]  Tuesday July 28th 8:30 am  Address: [address] [place], [place] [address] [place] [address]  Phone: [phone]

## 2020-07-21 NOTE — PROGRESS NOTE ADULT - SUBJECTIVE AND OBJECTIVE BOX
INTERVAL HPI/OVERNIGHT EVENTS:    Patient s/p PPM implant  No event over night. Pt without complains    MEDICATIONS  (STANDING):  chlorhexidine 4% Liquid 1 Application(s) Topical <User Schedule>  heparin   Injectable 5000 Unit(s) SubCutaneous every 8 hours  melatonin 5 milliGRAM(s) Oral at bedtime    MEDICATIONS  (PRN):  acetaminophen   Tablet .. 650 milliGRAM(s) Oral every 6 hours PRN Mild Pain (1 - 3)  ALBUTerol    90 MICROgram(s) HFA Inhaler 2 Puff(s) Inhalation every 6 hours PRN Shortness of Breath and/or Wheezing      Allergies    No Known Allergies    Intolerances          Vital Signs Last 24 Hrs  T(C): 36.6 (21 Jul 2020 10:00), Max: 37 (21 Jul 2020 04:00)  T(F): 97.8 (21 Jul 2020 10:00), Max: 98.6 (21 Jul 2020 04:00)  HR: 60 (21 Jul 2020 10:00) (60 - 74)  BP: 147/54 (21 Jul 2020 10:00) (96/64 - 147/54)  BP(mean): 110 (21 Jul 2020 10:00) (80 - 110)  RR: 18 (21 Jul 2020 10:00) (17 - 27)  SpO2: 99% (21 Jul 2020 10:00) (96% - 99%)    GENERAL: In no apparent distress, well nourished, and hydrated.  HEART: Regular rate and rhythm; No murmurs, rubs, or gallops.  	Wound healing well; No hematoma; no bleeding  PULMONARY: Clear to auscultation and perfusion.  No rales, wheezing, or rhonchi bilaterally.  ABDOMEN: Soft, Nontender, Nondistended; Bowel sounds present  EXTREMITIES:  2+ Peripheral Pulses, No clubbing, cyanosis, or edema  NEUROLOGICAL: Grossly nonfocal    12 Lead ECG:   Ventricular Rate 66 BPM    Atrial Rate 66 BPM    P-R Interval 176 ms    QRS Duration 160 ms    Q-T Interval 462 ms    QTC Calculation(Bezet) 484 ms    P Axis 65 degrees    R Axis -71 degrees    T Axis 84 degrees    Diagnosis Line Atrial-sensed ventricular-paced rhythm  Abnormal ECG    Confirmed by JEFRY LUND MD ([id number]) on 7/21/2020 11:01:25 AM (07-21-20 @ 07:01)      RADIOLOGY & ADDITIONAL TESTS:    < from: Xray Chest 1 View- PORTABLE-Routine (07.21.20 @ 05:04) >  Findings:    Support devices: Left chest wall pacemaker. Overlying telemetry leads.    Cardiac/mediastinum/hilum: No significant change    Lung parenchyma/Pleura: Patient is rotated. No evidence of focal consolidation pleural effusion or pneumothorax.     Skeleton/soft tissues: No significant change    Impression:      No evidence of focal consolidation pleural effusion or pneumothorax.     < end of copied text >      A/P   Patient s/p DC PPM implant ([employer])      - Device interrogation done, working properly   - FU in the office for wound check with Dr Melendez in 4weeks    No Heavy lifting >5 lbs. Do not raise your arm above shoulder level for 4-6 weeks.   No driving for 2weeks  Wound and dressing instructions per Dr Jordan team

## 2020-07-21 NOTE — DISCHARGE NOTE PROVIDER - NSDCCPCAREPLAN_GEN_ALL_CORE_FT
PRINCIPAL DISCHARGE DIAGNOSIS  Diagnosis: Second degree heart block  Assessment and Plan of Treatment: The electric conduction through your heart triggers the heart to beat.  The signal originates in the right atrium at the sinoatrial node and then goes to the AV (atrioventricular node)  then to the ventricles.  There was a significant slowing of the electric conduction in the AV node causing your heart to skip some beats.  This can cause symptoms such as light-headedness, dizziness, fainting or shortness of breath.   A permanent pacemaker was placed to fix the conduction problem in your heart.  Please follow up with your doctors.

## 2020-07-21 NOTE — DISCHARGE NOTE NURSING/CASE MANAGEMENT/SOCIAL WORK - NURSING SECTION COMPLETE
Hospitalist Progress Note    Patient: aLmine Díaz Age: 66 y.o. : 1960 MR#: [id number] SSN: [id number]  Date/Time: 2019 11:56 PM    Subjective:     Good appetite   Participating with PT      Objective:   VS:   Visit Vitals  /64 (BP 1 Location: Left arm, BP Patient Position: At rest)   Pulse 86   Temp 98.9 °F (37.2 °C)   Resp 14   Wt 57.5 kg (126 lb 12.8 oz)   SpO2 93%   Breastfeeding?  No   BMI 20.47 kg/m²      Tmax/24hrs: Temp (24hrs), Av.1 °F (36.7 °C), Min:97 °F (36.1 °C), Max:99.2 °F (37.3 °C)       Intake/Output Summary (Last 24 hours) at 2019 2356  Last data filed at 2019 1801  Gross per 24 hour   Intake 780 ml   Output 2300 ml   Net -1520 ml     General: awake and alert  HEENT:speech clear, conjunctiva clear, neck supple  Cardiovascular: HR reg 86  Pulmonary:  clear  GI: left periumbilical tenderness, BS+, martinez drains pink urine  Extremities:  Moving all 4 extremities well, + pulses, - edema     Labs:    Recent Results (from the past 24 hour(s))   METABOLIC PANEL, BASIC    Collection Time: 19  3:51 AM   Result Value Ref Range    Sodium 138 136 - 145 mmol/L    Potassium 3.5 3.5 - 5.5 mmol/L    Chloride 108 100 - 108 mmol/L    CO2 24 21 - 32 mmol/L    Anion gap 6 3.0 - 18 mmol/L    Glucose 187 (H) 74 - 99 mg/dL    BUN 21 (H) 7.0 - 18 MG/DL    Creatinine 0.97 0.6 - 1.3 MG/DL    BUN/Creatinine ratio 22 (H) 12 - 20      GFR est AA >60 >60 ml/min/1.73m2    GFR est non-AA 59 (L) >60 ml/min/1.73m2    Calcium 7.9 (L) 8.5 - 10.1 MG/DL   CBC W/O DIFF    Collection Time: 19  3:51 AM   Result Value Ref Range    WBC 16.5 (H) 4.6 - 13.2 K/uL    RBC 3.26 (L) 4.20 - 5.30 M/uL    HGB 10.3 (L) 12.0 - 16.0 g/dL    HCT 30.4 (L) 35.0 - 45.0 %    MCV 93.3 74.0 - 97.0 FL    MCH 31.6 24.0 - 34.0 PG    MCHC 33.9 31.0 - 37.0 g/dL    RDW 12.9 11.6 - 14.5 %    PLATELET 873 269 - 639 K/uL    MPV 11.0 9.2 - 11.8 FL   GLUCOSE, POC    Collection Time: 19  6:48 AM   Result Value Ref Range Glucose (POC) 198 (H) 70 - 110 mg/dL   GLUCOSE, POC    Collection Time: 07/11/19 11:37 AM   Result Value Ref Range    Glucose (POC) 234 (H) 70 - 110 mg/dL   GLUCOSE, POC    Collection Time: 07/11/19  4:42 PM   Result Value Ref Range    Glucose (POC) 130 (H) 70 - 110 mg/dL   GLUCOSE, POC    Collection Time: 07/11/19  8:47 PM   Result Value Ref Range    Glucose (POC) 146 (H) 70 - 110 mg/dL       Imaging:  No results found.     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

## 2020-07-21 NOTE — PROGRESS NOTE ADULT - ASSESSMENT
Advanced AV block  Stress test negative for ischemia. 2d echo with preserved LV function.  S/p PPM by CTS  F/u CXR  CTS and EP follow-up  D/c planning.

## 2020-07-21 NOTE — PROGRESS NOTE ADULT - SUBJECTIVE AND OBJECTIVE BOX
Discharge note    ADRIANNA GARZA  50y Male    INTERVAL HPI/OVERNIGHT EVENTS:    Pt feels well - ready to go home.  Cleared by EP for outpt.   Pacing on monitor    T(F): 96.7 (20 @ 08:00), Max: 98.6 (20 @ 04:00)  HR: 66 (20 @ 08:00) (60 - 74)  BP: 134/83 (20 @ 08:00) (96/64 - 134/83)  RR: 18 (20 @ 08:00) (17 - 27)  SpO2: 99% (20 @ 08:00) (96% - 99%) on RA    I&O's Summary    2020 07:01  -  2020 07:00  --------------------------------------------------------  IN: 0 mL / OUT: 650 mL / NET: -650 mL        Daily Height in cm: 190.5 (2020 07:15)    Daily Weight in k (2020 04:00)    PHYSICAL EXAM:  GENERAL: NAD  HEAD:  Normocephalic  EYES:  conjunctiva and sclera clear  ENMT: Moist mucous membranes  NECK: Supple, No JVD  NERVOUS SYSTEM:  Alert & Oriented X3, Good concentration  CHEST/LUNG: Clear to percussion bilaterally; No rales, rhonchi, wheezing  pacemaker site with dressing  HEART: Regular rate and rhythm; No murmurs  ABDOMEN: Soft, Nontender, Nondistended  EXTREMITIES:   No edema  SKIN: No rashes     Consultant(s) Notes Reviewed:  [x ] YES  [ ] NO  Care Discussed with Consultants/Other Providers [ x] YES  [ ] NO    MEDICATIONS  (STANDING):  chlorhexidine 4% Liquid 1 Application(s) Topical <User Schedule>  heparin   Injectable 5000 Unit(s) SubCutaneous every 8 hours  melatonin 5 milliGRAM(s) Oral at bedtime    MEDICATIONS  (PRN):  acetaminophen   Tablet .. 650 milliGRAM(s) Oral every 6 hours PRN Mild Pain (1 - 3)  ALBUTerol    90 MICROgram(s) HFA Inhaler 2 Puff(s) Inhalation every 6 hours PRN Shortness of Breath and/or Wheezing    Telemetry reviewed    LABS:                        15.1   8.06  )-----------( 205      ( 2020 04:17 )             45.8         136  |  98  |  16  ----------------------------<  93  4.7   |  23  |  1.0    Ca    9.2      2020 04:17  Mg     2.1         TPro  7.3  /  Alb  4.3  /  TBili  0.3  /  DBili  x   /  AST  31  /  ALT  29  /  AlkPhos  103                RADIOLOGY & ADDITIONAL TESTS:    Imaging or report Personally Reviewed:  [ x] YES  [ ] NO    < from: Xray Chest 1 View- PORTABLE-Routine (20 @ 05:04) >  Impression:      No evidence of focal consolidation pleural effusion or pneumothorax.     < end of copied text >      Case discussed with residents    Care discussed with pt

## 2020-07-21 NOTE — PROGRESS NOTE ADULT - PROVIDER SPECIALTY LIST ADULT
CCU
Cardiology
Electrophysiology
Hospitalist

## 2020-07-21 NOTE — DISCHARGE NOTE PROVIDER - NSDCFUADDINST_GEN_ALL_CORE_FT
No Heavy lifting >5 lbs. Do not raise your arm above shoulder level for 4-6 weeks.   No driving for 2 weeks  Wound and dressing instructions per Dr Jordan team

## 2020-07-21 NOTE — DISCHARGE NOTE PROVIDER - NSDCMRMEDTOKEN_GEN_ALL_CORE_FT
albuterol 90 mcg/inh inhalation aerosol: 2 puff(s) inhaled every 6 hours, As needed, Shortness of Breath and/or Wheezing  CeleBREX 200 mg oral capsule: 1 cap(s) orally 2 times a day  Keflex 500 mg oral capsule: 1 cap(s) orally 2 times a day

## 2020-07-21 NOTE — DISCHARGE NOTE NURSING/CASE MANAGEMENT/SOCIAL WORK - NSDCFUADDAPPT_GEN_ALL_CORE_FT
[facility] [place] [facility]  Tuesday July 28th 8:30 am  Address: [address] [place], [place] [address] [place] [address]  Phone: [phone]

## 2020-07-21 NOTE — DISCHARGE NOTE PROVIDER - HOSPITAL COURSE
49 y/o man with PMH of cocaine and alcohol abuse (quit both) presented to the ED from urgent care for 3 days of dyspnea, chest heaviness and dizziness.  He was found with second degree heart block, TVP was placed in ED and he was admitted to CCU.  ECHO showed a normal EF, nuclear stress test showed no ischemia but EF 40%.  On 7/20 a dual chamber PPM was placed.  CXR showed good placement and device was interrogated and is working properly.  Stable and ready for discharge.  On 5 days of Keflex at discharge.  Will follow up with CTS and EP outpatient.

## 2020-07-23 LAB
CULTURE RESULTS: SIGNIFICANT CHANGE UP
SPECIMEN SOURCE: SIGNIFICANT CHANGE UP

## 2020-07-25 DIAGNOSIS — I44.2 ATRIOVENTRICULAR BLOCK, COMPLETE: ICD-10-CM

## 2020-07-25 DIAGNOSIS — R00.1 BRADYCARDIA, UNSPECIFIED: ICD-10-CM

## 2020-07-25 DIAGNOSIS — F10.11 ALCOHOL ABUSE, IN REMISSION: ICD-10-CM

## 2020-07-25 DIAGNOSIS — Z83.3 FAMILY HISTORY OF DIABETES MELLITUS: ICD-10-CM

## 2020-07-25 DIAGNOSIS — F17.210 NICOTINE DEPENDENCE, CIGARETTES, UNCOMPLICATED: ICD-10-CM

## 2020-07-25 DIAGNOSIS — F14.11 COCAINE ABUSE, IN REMISSION: ICD-10-CM

## 2020-07-28 ENCOUNTER — APPOINTMENT (OUTPATIENT)
Dept: CARDIOTHORACIC SURGERY | Facility: CLINIC | Age: 50
End: 2020-07-28
Payer: COMMERCIAL

## 2020-07-28 ENCOUNTER — OUTPATIENT (OUTPATIENT)
Dept: OUTPATIENT SERVICES | Facility: HOSPITAL | Age: 50
LOS: 1 days | Discharge: HOME | End: 2020-07-28

## 2020-07-28 ENCOUNTER — APPOINTMENT (OUTPATIENT)
Dept: INTERNAL MEDICINE | Facility: CLINIC | Age: 50
End: 2020-07-28
Payer: COMMERCIAL

## 2020-07-28 VITALS
WEIGHT: 227 LBS | SYSTOLIC BLOOD PRESSURE: 125 MMHG | BODY MASS INDEX: 28.23 KG/M2 | DIASTOLIC BLOOD PRESSURE: 72 MMHG | OXYGEN SATURATION: 95 % | TEMPERATURE: 98 F | RESPIRATION RATE: 13 BRPM | HEART RATE: 67 BPM | HEIGHT: 75 IN

## 2020-07-28 VITALS
DIASTOLIC BLOOD PRESSURE: 84 MMHG | HEART RATE: 61 BPM | TEMPERATURE: 97.7 F | WEIGHT: 227 LBS | HEIGHT: 75 IN | BODY MASS INDEX: 28.23 KG/M2 | SYSTOLIC BLOOD PRESSURE: 120 MMHG

## 2020-07-28 DIAGNOSIS — F17.200 NICOTINE DEPENDENCE, UNSPECIFIED, UNCOMPLICATED: ICD-10-CM

## 2020-07-28 DIAGNOSIS — Z78.9 OTHER SPECIFIED HEALTH STATUS: ICD-10-CM

## 2020-07-28 DIAGNOSIS — M79.671 PAIN IN RIGHT FOOT: ICD-10-CM

## 2020-07-28 DIAGNOSIS — Z87.898 PERSONAL HISTORY OF OTHER SPECIFIED CONDITIONS: ICD-10-CM

## 2020-07-28 DIAGNOSIS — T82.111A BREAKDOWN (MECHANICAL) OF CARDIAC PULSE GENERATOR (BATTERY), INITIAL ENCOUNTER: ICD-10-CM

## 2020-07-28 DIAGNOSIS — G89.29 PAIN IN RIGHT FOOT: ICD-10-CM

## 2020-07-28 PROCEDURE — 99024 POSTOP FOLLOW-UP VISIT: CPT

## 2020-07-28 PROCEDURE — 99213 OFFICE O/P EST LOW 20 MIN: CPT | Mod: GC

## 2020-07-28 NOTE — HISTORY OF PRESENT ILLNESS
[FreeTextEntry1] : post discharge follow up [de-identified] : 51 y/o M with PMHx of smoking, COPD and heel surgeries for bunion and spurs recently admitted to the hospital for increased dyspnea, and dizziness where he was found to have high degree A/V block with Mobitz II requiring TVP and eventually PPM. Pt denies any symptoms after the insertion of PPM. He occasionally gets wheezing and episodic SOB but goes away with albuterol. Pt also snores at night with episodic spells of apnea per wife and some tiredness during the day.

## 2020-07-28 NOTE — PHYSICAL EXAM
[No Acute Distress] : no acute distress [Well Nourished] : well nourished [Well Developed] : well developed [Well-Appearing] : well-appearing [Normal Sclera/Conjunctiva] : normal sclera/conjunctiva [PERRL] : pupils equal round and reactive to light [EOMI] : extraocular movements intact [Normal Outer Ear/Nose] : the outer ears and nose were normal in appearance [Normal Oropharynx] : the oropharynx was normal [No JVD] : no jugular venous distention [No Lymphadenopathy] : no lymphadenopathy [Supple] : supple [Thyroid Normal, No Nodules] : the thyroid was normal and there were no nodules present [No Respiratory Distress] : no respiratory distress  [No Accessory Muscle Use] : no accessory muscle use [Clear to Auscultation] : lungs were clear to auscultation bilaterally [Normal Rate] : normal rate  [Regular Rhythm] : with a regular rhythm [Normal S1, S2] : normal S1 and S2 [No Murmur] : no murmur heard [No Carotid Bruits] : no carotid bruits [No Abdominal Bruit] : a ~M bruit was not heard ~T in the abdomen [No Varicosities] : no varicosities [Pedal Pulses Present] : the pedal pulses are present [No Edema] : there was no peripheral edema [No Palpable Aorta] : no palpable aorta [No Extremity Clubbing/Cyanosis] : no extremity clubbing/cyanosis [Soft] : abdomen soft [Non Tender] : non-tender [Non-distended] : non-distended [No Masses] : no abdominal mass palpated [No HSM] : no HSM [Normal Bowel Sounds] : normal bowel sounds [Normal Posterior Cervical Nodes] : no posterior cervical lymphadenopathy [Normal Anterior Cervical Nodes] : no anterior cervical lymphadenopathy [No CVA Tenderness] : no CVA  tenderness [No Spinal Tenderness] : no spinal tenderness [Grossly Normal Strength/Tone] : grossly normal strength/tone [No Joint Swelling] : no joint swelling [No Rash] : no rash [Coordination Grossly Intact] : coordination grossly intact [No Focal Deficits] : no focal deficits [Normal Gait] : normal gait [Deep Tendon Reflexes (DTR)] : deep tendon reflexes were 2+ and symmetric [Normal Affect] : the affect was normal [Normal Insight/Judgement] : insight and judgment were intact [de-identified] : PPM site clean with no swelling or erythema

## 2020-07-28 NOTE — COUNSELING
[Risk of tobacco use and health benefits of smoking cessation discussed] : Risk of tobacco use and health benefits of smoking cessation discussed [Cessation strategies including cessation program discussed] : Cessation strategies including cessation program discussed [Use of nicotine replacement therapies and other medications discussed] : Use of nicotine replacement therapies and other medications discussed [Encouraged to pick a quit date and identify support needed to quit] : Encouraged to pick a quit date and identify support needed to quit [Benefits of weight loss discussed] : Benefits of weight loss discussed [Structured Weight Management Program suggested:] : Structured weight management program suggested [Encouraged to maintain food diary] : Encouraged to maintain food diary [Encouraged to increase physical activity] : Encouraged to increase physical activity [Encouraged to use exercise tracking device] : Encouraged to use exercise tracking device

## 2020-07-28 NOTE — ASSESSMENT
[FreeTextEntry1] : 49 y/o M with PMHx of smoking, COPD and heel surgeries for bunion and spurs recently admitted to the hospital for increased dyspnea, and dizziness where he was found to have high degree A/V block with Mobitz II requiring TVP and eventually PPM.\par \par # High degree type II Mobitz A/V block s/p dual chamber PPM placement\par - follows up with Dr Loza next month\par - no more symptoms \par \par # Episodic SOB with wheezing\par - c/w albuterol prn\par - pulm eval for PFTs and sleep studies for possible MAVIS\par \par # Chronic b/l LE heel pain\par - tylenol prn \par - c/w f/u with podiatry Dr Hester\par \par # HCM\par TDap administered last year\par will do basic blood work.

## 2020-07-29 DIAGNOSIS — I44.39 OTHER ATRIOVENTRICULAR BLOCK: ICD-10-CM

## 2020-07-29 DIAGNOSIS — Z87.898 PERSONAL HISTORY OF OTHER SPECIFIED CONDITIONS: ICD-10-CM

## 2020-07-29 DIAGNOSIS — Z78.9 OTHER SPECIFIED HEALTH STATUS: ICD-10-CM

## 2020-07-29 DIAGNOSIS — J44.9 CHRONIC OBSTRUCTIVE PULMONARY DISEASE, UNSPECIFIED: ICD-10-CM

## 2020-07-29 DIAGNOSIS — M79.671 PAIN IN RIGHT FOOT: ICD-10-CM

## 2020-07-29 DIAGNOSIS — Z95.0 PRESENCE OF CARDIAC PACEMAKER: ICD-10-CM

## 2020-07-29 DIAGNOSIS — F17.200 NICOTINE DEPENDENCE, UNSPECIFIED, UNCOMPLICATED: ICD-10-CM

## 2020-07-29 LAB — B BURGDOR DNA SPEC QL NAA+PROBE: NEGATIVE — SIGNIFICANT CHANGE UP

## 2020-08-20 ENCOUNTER — APPOINTMENT (OUTPATIENT)
Dept: CARDIOLOGY | Facility: CLINIC | Age: 50
End: 2020-08-20
Payer: COMMERCIAL

## 2020-08-20 VITALS
TEMPERATURE: 98 F | HEIGHT: 75 IN | HEART RATE: 62 BPM | WEIGHT: 227 LBS | BODY MASS INDEX: 28.23 KG/M2 | SYSTOLIC BLOOD PRESSURE: 126 MMHG | DIASTOLIC BLOOD PRESSURE: 82 MMHG

## 2020-08-20 DIAGNOSIS — I44.39 OTHER ATRIOVENTRICULAR BLOCK: ICD-10-CM

## 2020-08-20 PROCEDURE — 99024 POSTOP FOLLOW-UP VISIT: CPT

## 2020-08-20 PROCEDURE — 93280 PM DEVICE PROGR EVAL DUAL: CPT

## 2020-08-20 NOTE — ASSESSMENT
[FreeTextEntry1] :  A/P \par High degree AVB  s/p Dual Chamber PPM 7/20/2020 \par left infraclavicular PPM site healed well, no s/s infection \par \par PPM interrogation: Normal function. Baseline rhythm - 1 AVB . AV delays extended,  see device section for details . \par Several episodes of ST with HR between 150-160's correlating with physical exertion. \par \par Patient is enrolled in remote monitoring services with Dr. Melendez . \par He will be following with our clinic for further device interrogations since Dr. Melendez's office  does not accept his insurance . \par \par \par Plan\par  \par -Continue current medications.\par -Will request remote PPM transfer to our clinic.\par -Return for follow up in 4 months\par -Smoking cessation reinforced\par -Refraining from elicit drug use reinforced \par   \par

## 2020-08-20 NOTE — HISTORY OF PRESENT ILLNESS
[de-identified] : 50 year old M with PMHx of cocaine-induced neutropenia, substance abuse (cocaine), who presented to ED for 3 days of dyspnea, chest heaviness, feeling dizzy and light headed when he ambulates. He was found to have High degree AVB , TVP placed in ER . ECHO showed : Normal EF, and Nuclear stress test was negative for ischemia. He underwent a Dual Chamber PPM implantation on 7/20/2020 . \par He returns for follow up, device interrogation. \par \par He reports feeling much better now, SOB and dizziness resolved. He denies dizziness, near syncope or syncope. States, he resumed jogging several a week.\par \par ECG ( 8/20/2020): NSR, 1 AVB at 63 bpm (fusion beats)   \par

## 2020-08-20 NOTE — PHYSICAL EXAM
[General Appearance - Well Developed] : well developed [General Appearance - Well Nourished] : well nourished [Normal Appearance] : normal appearance [Heart Sounds] : normal S1 and S2 [Arterial Pulses Normal] : the arterial pulses were normal [Heart Rate And Rhythm] : heart rate and rhythm were normal [] : no respiratory distress [Respiration, Rhythm And Depth] : normal respiratory rhythm and effort [Left Infraclavicular] : left infraclavicular area [Auscultation Breath Sounds / Voice Sounds] : lungs were clear to auscultation bilaterally [Dry] : dry [Well-Healed] : well-healed [Clean] : clean [Bowel Sounds] : normal bowel sounds [Abdomen Tenderness] : non-tender [Abdomen Soft] : soft [Nail Clubbing] : no clubbing of the fingernails [Cyanosis, Localized] : no localized cyanosis

## 2020-08-20 NOTE — PROCEDURE
[No] : not [Sinus Bradycardia] : sinus bradycardia [See Scanned Paceart Report] : See scanned paceart report [See Device Printout] : See device printout [Pacemaker] : pacemaker [DDD] : DDD [Longevity: ___ months] : The estimated remaining battery life is [unfilled] months [Voltage: ___ volts] : Voltage was [unfilled] volts [Threshold Testing Performed] : Threshold testing was performed [Lead Imp:  ___ohms] : lead impedance was [unfilled] ohms [___V @] : [unfilled] V [Sensing Amplitude ___mv] : sensing amplitude was [unfilled] mv [___ ms] : [unfilled] ms [Counters Reset] : the counters were reset [[facility]-identified] [facility]  [[facility]-identified] : [id number] [de-identified] : 60 [de-identified] : 9 episodes of ST - all during the day time, correlating with physical exertion \par AP 38.9%,  98.3% . Baseline rhythm - 1 AVB at 64 bpm. AV delays extended to  RV pacing and allow for intrinsic conduction.  [de-identified] : 7/20/2020

## 2020-09-11 ENCOUNTER — APPOINTMENT (OUTPATIENT)
Dept: PULMONOLOGY | Facility: CLINIC | Age: 50
End: 2020-09-11
Payer: COMMERCIAL

## 2020-09-11 ENCOUNTER — OUTPATIENT (OUTPATIENT)
Dept: OUTPATIENT SERVICES | Facility: HOSPITAL | Age: 50
LOS: 1 days | Discharge: HOME | End: 2020-09-11

## 2020-09-11 VITALS — SYSTOLIC BLOOD PRESSURE: 132 MMHG | TEMPERATURE: 98.3 F | DIASTOLIC BLOOD PRESSURE: 82 MMHG | HEIGHT: 75 IN

## 2020-09-11 DIAGNOSIS — R06.83 SNORING: ICD-10-CM

## 2020-09-11 PROCEDURE — 99203 OFFICE O/P NEW LOW 30 MIN: CPT | Mod: GC

## 2020-09-11 NOTE — PHYSICAL EXAM
[No Acute Distress] : no acute distress [Normal Appearance] : normal appearance [Normal Rate/Rhythm] : normal rate/rhythm [Normal S1, S2] : normal s1, s2 [No Resp Distress] : no resp distress [Clear to Auscultation Bilaterally] : clear to auscultation bilaterally [Benign] : benign [No Focal Deficits] : no focal deficits [No Edema] : no edema [TextBox_80] : wheeze on left cleared with coughing

## 2020-09-11 NOTE — ASSESSMENT
[FreeTextEntry1] : 50 M former smoker quit 3 mo ago [40 PYs], Heart block s/p PPM placed this year, referred for eval of dyspnea. Dyspnea occurs mainly on exertion but can happen intermittently at rest without aggregating factors. No orthopnea. Occasional cough, no sputum. never been hospitalized for pulmonary issue or intubated. Also c/o snoring, witness apneas and daytime sleepiness. Vitals stable pox 99% RA, normal exam.\par \par echo 2020 normal\par cardiac nuclear stress 2020 negative\par CXR 7/2020 normal\par CT chest 2012 normal\par ABG no hypercapnia\par \par Assessment:\par \par Dyspnea on exertion on and off for 10 yrs, not progressive, former heavy smoker suspect COPD or asthma has relief with albuterol: Send for PFTs, counselled on continued smoking cessation, refilled ventolin.\par \par Stop Bang 4 high risk for MAVIS: send for PSG\par \par High degree AVB s/p PPM: follows with cardio\par \par \par rtc 2 month

## 2020-09-11 NOTE — HISTORY OF PRESENT ILLNESS
[TextBox_4] : 50 M former smoker quit 3 mo ago [40 PYs], Heart block s/p PPM placed this year, referred for eval of dyspnea. Dyspnea occurs mainly on exertion but can happen intermittently at rest without aggregating factors. No orthopnea. Occasional cough, no sputum. never been hospitalized for pulmonary issue or intubated. 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

## 2020-09-11 NOTE — REVIEW OF SYSTEMS
[Cough] : cough [Dyspnea] : dyspnea [SOB on Exertion] : sob on exertion [Negative] : Endocrine [Sputum] : no sputum

## 2020-09-15 ENCOUNTER — OUTPATIENT (OUTPATIENT)
Dept: OUTPATIENT SERVICES | Facility: HOSPITAL | Age: 50
LOS: 1 days | Discharge: HOME | End: 2020-09-15

## 2020-09-16 DIAGNOSIS — Z11.59 ENCOUNTER FOR SCREENING FOR OTHER VIRAL DISEASES: ICD-10-CM

## 2020-09-17 ENCOUNTER — OUTPATIENT (OUTPATIENT)
Dept: OUTPATIENT SERVICES | Facility: HOSPITAL | Age: 50
LOS: 1 days | Discharge: HOME | End: 2020-09-17

## 2020-09-17 LAB — SARS-COV-2 N GENE NPH QL NAA+PROBE: NOT DETECTED

## 2020-09-22 LAB
25(OH)D3 SERPL-MCNC: 31 NG/ML
ALBUMIN SERPL ELPH-MCNC: 4.6 G/DL
ALP BLD-CCNC: 97 U/L
ALT SERPL-CCNC: 25 U/L
ANION GAP SERPL CALC-SCNC: 12 MMOL/L
AST SERPL-CCNC: 25 U/L
BASOPHILS # BLD AUTO: 0.07 K/UL
BASOPHILS NFR BLD AUTO: 1 %
BILIRUB SERPL-MCNC: 0.3 MG/DL
BUN SERPL-MCNC: 13 MG/DL
CALCIUM SERPL-MCNC: 9.5 MG/DL
CHLORIDE SERPL-SCNC: 102 MMOL/L
CHOLEST SERPL-MCNC: 200 MG/DL
CHOLEST/HDLC SERPL: 5.4 RATIO
CO2 SERPL-SCNC: 26 MMOL/L
CREAT SERPL-MCNC: 1 MG/DL
EOSINOPHIL # BLD AUTO: 0.1 K/UL
EOSINOPHIL NFR BLD AUTO: 1.5 %
ESTIMATED AVERAGE GLUCOSE: 126 MG/DL
GLUCOSE SERPL-MCNC: 96 MG/DL
HBA1C MFR BLD HPLC: 6 %
HCT VFR BLD CALC: 46.8 %
HDLC SERPL-MCNC: 37 MG/DL
HGB BLD-MCNC: 14.9 G/DL
IMM GRANULOCYTES NFR BLD AUTO: 0.9 %
LDLC SERPL CALC-MCNC: 141 MG/DL
LYMPHOCYTES # BLD AUTO: 1.45 K/UL
LYMPHOCYTES NFR BLD AUTO: 21.1 %
MAGNESIUM SERPL-MCNC: 1.9 MG/DL
MAN DIFF?: NORMAL
MCHC RBC-ENTMCNC: 27.9 PG
MCHC RBC-ENTMCNC: 31.8 G/DL
MCV RBC AUTO: 87.5 FL
MONOCYTES # BLD AUTO: 0.55 K/UL
MONOCYTES NFR BLD AUTO: 8 %
NEUTROPHILS # BLD AUTO: 4.65 K/UL
NEUTROPHILS NFR BLD AUTO: 67.5 %
PLATELET # BLD AUTO: 232 K/UL
POTASSIUM SERPL-SCNC: 4.9 MMOL/L
PROT SERPL-MCNC: 7.5 G/DL
RBC # BLD: 5.35 M/UL
RBC # FLD: 13.2 %
SODIUM SERPL-SCNC: 140 MMOL/L
TRIGL SERPL-MCNC: 176 MG/DL
TSH SERPL-ACNC: 1 UIU/ML
WBC # FLD AUTO: 6.88 K/UL

## 2020-09-29 ENCOUNTER — OUTPATIENT (OUTPATIENT)
Dept: OUTPATIENT SERVICES | Facility: HOSPITAL | Age: 50
LOS: 1 days | Discharge: HOME | End: 2020-09-29

## 2020-09-29 ENCOUNTER — APPOINTMENT (OUTPATIENT)
Dept: INTERNAL MEDICINE | Facility: CLINIC | Age: 50
End: 2020-09-29
Payer: COMMERCIAL

## 2020-09-29 VITALS
BODY MASS INDEX: 31.46 KG/M2 | WEIGHT: 253 LBS | HEIGHT: 75 IN | DIASTOLIC BLOOD PRESSURE: 85 MMHG | SYSTOLIC BLOOD PRESSURE: 116 MMHG | HEART RATE: 62 BPM | TEMPERATURE: 96.5 F

## 2020-09-29 DIAGNOSIS — Z95.0 PRESENCE OF CARDIAC PACEMAKER: ICD-10-CM

## 2020-09-29 PROCEDURE — 99212 OFFICE O/P EST SF 10 MIN: CPT | Mod: GC

## 2020-09-29 NOTE — PHYSICAL EXAM
[No Acute Distress] : no acute distress [Well Nourished] : well nourished [Well Developed] : well developed [Normal Sclera/Conjunctiva] : normal sclera/conjunctiva [PERRL] : pupils equal round and reactive to light [Normal Outer Ear/Nose] : the outer ears and nose were normal in appearance [Normal Oropharynx] : the oropharynx was normal [No JVD] : no jugular venous distention [No Lymphadenopathy] : no lymphadenopathy [Supple] : supple [No Respiratory Distress] : no respiratory distress  [No Accessory Muscle Use] : no accessory muscle use [Clear to Auscultation] : lungs were clear to auscultation bilaterally [Normal Rate] : normal rate  [Regular Rhythm] : with a regular rhythm [Normal S1, S2] : normal S1 and S2 [No Murmur] : no murmur heard [No Carotid Bruits] : no carotid bruits [No Abdominal Bruit] : a ~M bruit was not heard ~T in the abdomen [No Edema] : there was no peripheral edema [Soft] : abdomen soft [Non Tender] : non-tender [Non-distended] : non-distended [No HSM] : no HSM [Normal Bowel Sounds] : normal bowel sounds [No CVA Tenderness] : no CVA  tenderness [No Joint Swelling] : no joint swelling [No Rash] : no rash [Coordination Grossly Intact] : coordination grossly intact [No Focal Deficits] : no focal deficits [Normal Gait] : normal gait [Normal Affect] : the affect was normal [Normal Insight/Judgement] : insight and judgment were intact

## 2020-09-30 PROBLEM — Z95.0 CARDIAC PACEMAKER: Status: ACTIVE | Noted: 2020-07-28

## 2020-09-30 NOTE — REVIEW OF SYSTEMS
[Fever] : no fever [Chills] : no chills [Fatigue] : no fatigue [Night Sweats] : no night sweats [Discharge] : no discharge [Pain] : no pain [Vision Problems] : no vision problems [Itching] : no itching [Earache] : no earache [Hearing Loss] : no hearing loss [Nosebleeds] : no nosebleeds [Nasal Discharge] : no nasal discharge [Chest Pain] : no chest pain [Palpitations] : no palpitations [Claudication] : no  leg claudication [Orthopena] : no orthopnea [Paroxysmal Nocturnal Dyspnea] : no paroxysmal nocturnal dyspnea [Shortness Of Breath] : no shortness of breath [Wheezing] : no wheezing [Cough] : no cough [Dyspnea on Exertion] : not dyspnea on exertion [Abdominal Pain] : no abdominal pain [Nausea] : no nausea [Constipation] : no constipation [Diarrhea] : no diarrhea [Vomiting] : no vomiting [Dysuria] : no dysuria [Incontinence] : no incontinence [Hesitancy] : no hesitancy [Hematuria] : no hematuria [Joint Pain] : no joint pain [Joint Stiffness] : no joint stiffness [Muscle Pain] : no muscle pain [Back Pain] : no back pain [Skin Rash] : no skin rash [Headache] : no headache [Dizziness] : no dizziness [Fainting] : no fainting [Memory Loss] : no memory loss [Insomnia] : no insomnia [Easy Bleeding] : no easy bleeding

## 2020-09-30 NOTE — HISTORY OF PRESENT ILLNESS
[FreeTextEntry1] : Follow up  [de-identified] : 50 year old male patient presenting for followup on bloodwork. \par \par Known asthmatic, Mobitz II AV block (was symptomatic as well) status post dual chamber pacemaker, history of cocaine and alcohol abuse (quit a while ago).\par \par Currently denies any symptom on full review of systems however, patient notes significant weight gain that he attributes to not exercising and overeating recently. \par On cardiac assessment, while in the hospital, workup for AV block was done, echo showed no significant abnormalities, adenosine NM perfusion showed no significant ischemia however LVEF was reported as 40%. \par Patient denies orthopnea, PND, LLE, dyspnea at rest, however admits to decreased functional capacity.

## 2020-09-30 NOTE — ASSESSMENT
[FreeTextEntry1] : 50 year old male patient presenting for followup on bloodwork. \par \par # CVS: Mobitz II AV block (was symptomatic as well) status post dual chamber pacemaker, history of cocaine and alcohol abuse (quit a while ago).\par \par # Dyslipidemia \par - LDL elevated at 141, trig at 176, HDL at 37\par - Patient admits to gaining weight and not exercising\par - Councelling on importance of diet and exercise done, patient agreeable\par - Will Keep of statin for now, repeat lipid panel in 3 months and re-assess\par \par # Status post Dual chamber pacemaker\par - Wound healed well over device\par - No complaints\par - Discrepancy between LVEF on echo and stress testing, no clinical signs of LV failure\par - Should follow up with cardiologist as he has none, will refer. \par \par # Hyperactive airway disease\par - Quit smoking 3 months ago / denies craving\par - Albuterol PRN\par - Following with pulmonary\par \par Refused Flu vaccine\par To follow in 3 months.

## 2020-10-22 ENCOUNTER — APPOINTMENT (OUTPATIENT)
Dept: CARDIOLOGY | Facility: CLINIC | Age: 50
End: 2020-10-22
Payer: COMMERCIAL

## 2020-10-22 PROCEDURE — 93296 REM INTERROG EVL PM/IDS: CPT

## 2020-10-22 PROCEDURE — 93294 REM INTERROG EVL PM/LDLS PM: CPT

## 2020-10-27 ENCOUNTER — NON-APPOINTMENT (OUTPATIENT)
Age: 50
End: 2020-10-27

## 2020-10-27 ENCOUNTER — APPOINTMENT (OUTPATIENT)
Dept: CARDIOLOGY | Facility: CLINIC | Age: 50
End: 2020-10-27
Payer: COMMERCIAL

## 2020-10-27 VITALS
BODY MASS INDEX: 30.34 KG/M2 | DIASTOLIC BLOOD PRESSURE: 84 MMHG | HEIGHT: 75 IN | HEART RATE: 70 BPM | TEMPERATURE: 95.5 F | SYSTOLIC BLOOD PRESSURE: 111 MMHG | WEIGHT: 244 LBS

## 2020-10-27 DIAGNOSIS — Z95.0 PRESENCE OF CARDIAC PACEMAKER: ICD-10-CM

## 2020-10-27 DIAGNOSIS — I47.1 SUPRAVENTRICULAR TACHYCARDIA: ICD-10-CM

## 2020-10-27 DIAGNOSIS — R07.9 CHEST PAIN, UNSPECIFIED: ICD-10-CM

## 2020-10-27 PROCEDURE — 99072 ADDL SUPL MATRL&STAF TM PHE: CPT

## 2020-10-27 PROCEDURE — 99213 OFFICE O/P EST LOW 20 MIN: CPT

## 2020-10-27 PROCEDURE — 93288 INTERROG EVL PM/LDLS PM IP: CPT

## 2020-10-28 LAB
ANION GAP SERPL CALC-SCNC: 10 MMOL/L
BUN SERPL-MCNC: 12 MG/DL
CALCIUM SERPL-MCNC: 9.3 MG/DL
CHLORIDE SERPL-SCNC: 103 MMOL/L
CO2 SERPL-SCNC: 26 MMOL/L
CREAT SERPL-MCNC: 1 MG/DL
GLUCOSE SERPL-MCNC: 81 MG/DL
POTASSIUM SERPL-SCNC: 4.6 MMOL/L
SODIUM SERPL-SCNC: 139 MMOL/L

## 2020-11-19 ENCOUNTER — OUTPATIENT (OUTPATIENT)
Dept: OUTPATIENT SERVICES | Facility: HOSPITAL | Age: 50
LOS: 1 days | Discharge: HOME | End: 2020-11-19
Payer: MEDICAID

## 2020-11-19 ENCOUNTER — RESULT REVIEW (OUTPATIENT)
Age: 50
End: 2020-11-19

## 2020-11-19 DIAGNOSIS — I47.1 SUPRAVENTRICULAR TACHYCARDIA: ICD-10-CM

## 2020-11-19 PROCEDURE — 75574 CT ANGIO HRT W/3D IMAGE: CPT | Mod: 26

## 2020-11-20 ENCOUNTER — APPOINTMENT (OUTPATIENT)
Dept: PULMONOLOGY | Facility: CLINIC | Age: 50
End: 2020-11-20

## 2020-12-01 PROBLEM — R07.9 CHEST PAIN: Status: ACTIVE | Noted: 2020-12-01

## 2020-12-01 PROBLEM — Z95.0 S/P CARDIAC PACEMAKER PROCEDURE: Status: ACTIVE | Noted: 2020-07-28

## 2020-12-01 NOTE — BRIEF OPERATIVE NOTE - ASSISTANT(S)
Chio Verbushra 
Dr. Brendan Richter PGY2

## 2020-12-01 NOTE — PROCEDURE
[No] : not [Sinus Bradycardia] : sinus bradycardia [See Scanned Paceart Report] : See scanned paceart report [See Device Printout] : See device printout [Pacemaker] : pacemaker [DDD] : DDD [Voltage: ___ volts] : Voltage was [unfilled] volts [Longevity: ___ months] : The estimated remaining battery life is [unfilled] months [Threshold Testing Performed] : Threshold testing was performed [Lead Imp:  ___ohms] : lead impedance was [unfilled] ohms [Sensing Amplitude ___mv] : sensing amplitude was [unfilled] mv [___V @] : [unfilled] V [___ ms] : [unfilled] ms [Counters Reset] : the counters were reset [[facility]  [de-identified] : [id number] [de-identified] : 7/20/2020 [de-identified] : 60 [de-identified] : 9 episodes of ST - all during the day time, correlating with physical exertion \par AP 38.9%,  98.3% . Baseline rhythm - 1 AVB at 64 bpm. AV delays extended to  RV pacing and allow for intrinsic conduction.

## 2020-12-01 NOTE — HISTORY OF PRESENT ILLNESS
[de-identified] : 50 year old M with PMHx of cocaine-induced neutropenia, substance abuse (cocaine), who presented to ED for 3 days of dyspnea, chest heaviness, feeling dizzy and light headed when he ambulates. He was found to have High degree AVB , TVP placed in ER . ECHO showed : Normal EF. Cardiac MRI was not done d/t TVP.\par He underwent a Dual Chamber PPM implantation on 7/20/2020 . \par He returns for an urgent follow up .  \par \par He reports worsening CLARKE with mild exertion and occasional chest discomfort radiating to his left arm. \par Reports occasional  dizziness . Denies near syncope or syncope. \par .\par Diagnostic examinations: \par ECG (10/26/2020): NSR at 75 bpm, RsR in V1/V2 \par ECG ( 8/20/2020): NSR, 1 AVB at 63 bpm (fusion beats)   \par ECHO \par

## 2020-12-01 NOTE — ASSESSMENT
[FreeTextEntry1] :  A/P \par High degree AVB  s/p Dual Chamber PPM 7/20/2020 \par left infraclavicular PPM site healed well, no s/s infection \par \par PPM interrogation: Normal function. Baseline rhythm - 1 AVB .  \par Several episodes of ST with HR between 150-160's correlating with physical exertion. 3 episodes of SVT \par \par \par A/P \par \par 1. CLARKE /SVTs /Chest discomfort \par -Start Metoprolol ER 25 mg daily  \par -Cardiac MRI to r/o infiltrative heart disease  \par -Cardiac CTA with coronaries r/o CAD \par -Return for follow up in 4 weeks \par -Smoking cessation reinforced\par -Refraining from elicit drug use reinforced \par   \par

## 2020-12-03 ENCOUNTER — APPOINTMENT (OUTPATIENT)
Dept: CARDIOLOGY | Facility: CLINIC | Age: 50
End: 2020-12-03

## 2020-12-08 ENCOUNTER — LABORATORY RESULT (OUTPATIENT)
Age: 50
End: 2020-12-08

## 2020-12-08 ENCOUNTER — OUTPATIENT (OUTPATIENT)
Dept: OUTPATIENT SERVICES | Facility: HOSPITAL | Age: 50
LOS: 1 days | Discharge: HOME | End: 2020-12-08

## 2020-12-08 DIAGNOSIS — Z11.59 ENCOUNTER FOR SCREENING FOR OTHER VIRAL DISEASES: ICD-10-CM

## 2020-12-15 ENCOUNTER — APPOINTMENT (OUTPATIENT)
Dept: CARDIOLOGY | Facility: CLINIC | Age: 50
End: 2020-12-15

## 2020-12-29 ENCOUNTER — APPOINTMENT (OUTPATIENT)
Dept: INTERNAL MEDICINE | Facility: CLINIC | Age: 50
End: 2020-12-29
Payer: COMMERCIAL

## 2020-12-29 ENCOUNTER — OUTPATIENT (OUTPATIENT)
Dept: OUTPATIENT SERVICES | Facility: HOSPITAL | Age: 50
LOS: 1 days | Discharge: HOME | End: 2020-12-29

## 2020-12-29 VITALS
DIASTOLIC BLOOD PRESSURE: 75 MMHG | WEIGHT: 245 LBS | TEMPERATURE: 98.3 F | HEIGHT: 75 IN | OXYGEN SATURATION: 96 % | BODY MASS INDEX: 30.46 KG/M2 | HEART RATE: 81 BPM | SYSTOLIC BLOOD PRESSURE: 112 MMHG

## 2020-12-29 DIAGNOSIS — J44.9 CHRONIC OBSTRUCTIVE PULMONARY DISEASE, UNSPECIFIED: ICD-10-CM

## 2020-12-29 DIAGNOSIS — E78.5 HYPERLIPIDEMIA, UNSPECIFIED: ICD-10-CM

## 2020-12-29 DIAGNOSIS — Z00.00 ENCOUNTER FOR GENERAL ADULT MEDICAL EXAMINATION W/OUT ABNORMAL FINDINGS: ICD-10-CM

## 2020-12-29 DIAGNOSIS — Z00.00 ENCOUNTER FOR GENERAL ADULT MEDICAL EXAMINATION WITHOUT ABNORMAL FINDINGS: ICD-10-CM

## 2020-12-29 PROCEDURE — 99213 OFFICE O/P EST LOW 20 MIN: CPT | Mod: GC

## 2020-12-29 RX ORDER — ALBUTEROL SULFATE 90 UG/1
108 (90 BASE) INHALANT RESPIRATORY (INHALATION) 3 TIMES DAILY
Qty: 3 | Refills: 3 | Status: DISCONTINUED | COMMUNITY
Start: 2020-07-28 | End: 2020-12-29

## 2020-12-29 NOTE — PLAN
[FreeTextEntry1] : 50 year old male with pmhx of Mobitz II AV block (was symptomatic as well) status post dual chamber pacemaker,  cocaine and alcohol abuse (quit a while ago). presenting for followup.\par \par # Dyslipidemia \par - Cholesterol 198\par - LDL elevated at 134, trig at 193, HDL at 31 12/2020\par - Patient admits to gaining weight and not exercising\par - Counselling on importance of diet and exercise done, patient agreeable\par - Will Keep off statin for now\par \par # Status post Dual chamber pacemaker\par - Wound healed well over device\par - No complaints\par - Discrepancy between LVEF on echo and stress testing, no clinical signs of LV failure\par - Follows up with Martha Montalvo NP and Dr. Loza\par \par # Hyperactive airway disease\par - Quit smoking 3 months ago / denies craving\par - Albuterol PRN\par - Following with pulmonary\par \par #Pre-DM\par - A1c 6.0\par -counselled on diet and exercise\par -will repeat\par \par #HCM\par Refused Flu vaccine\par To follow in 5 months. \par Routine labs sent

## 2020-12-29 NOTE — PHYSICAL EXAM
[No Acute Distress] : no acute distress [Well-Appearing] : well-appearing [Normal Sclera/Conjunctiva] : normal sclera/conjunctiva [Normal Outer Ear/Nose] : the outer ears and nose were normal in appearance [Supple] : supple [No Respiratory Distress] : no respiratory distress  [No Accessory Muscle Use] : no accessory muscle use [Clear to Auscultation] : lungs were clear to auscultation bilaterally [Normal Rate] : normal rate  [Regular Rhythm] : with a regular rhythm [No Edema] : there was no peripheral edema [Soft] : abdomen soft [Non Tender] : non-tender [Non-distended] : non-distended [No Rash] : no rash [No Focal Deficits] : no focal deficits [Normal Affect] : the affect was normal

## 2020-12-29 NOTE — HISTORY OF PRESENT ILLNESS
[FreeTextEntry1] : follow up [de-identified] : 50 year old male with pmhx of asthma, Mobitz II AV block (was symptomatic as well) status post dual chamber pacemaker, cocaine and alcohol abuse (quit a while ago) presenting for followup. No complaints today

## 2021-01-21 ENCOUNTER — APPOINTMENT (OUTPATIENT)
Dept: CARDIOLOGY | Facility: CLINIC | Age: 51
End: 2021-01-21
Payer: COMMERCIAL

## 2021-01-21 PROCEDURE — 93296 REM INTERROG EVL PM/IDS: CPT

## 2021-01-21 PROCEDURE — 93294 REM INTERROG EVL PM/LDLS PM: CPT

## 2021-03-10 NOTE — ED ADULT NURSE NOTE - DRUG PRE-SCREENING (DAST -1)
HPI/Subjective:   Patient ID: Madonna Ng is a 73year old male.     HPI  Here for htn, feels well, no complaints, dm, hyperchol  /74 (BP Location: Right arm, Patient Position: Sitting, Cuff Size: adult)   Pulse 80   Temp 97.2 °F (36.2 °C) (Tempor (CEREFOLIN NAC) 6-2-600 MG Oral Tab Take 1 tablet by mouth daily. • Omega-3 Fatty Acids (FISH OIL) 600 MG Oral Cap Take  by mouth. • aspirin 81 MG Oral Chew Tab Chew 81 mg by mouth daily. Allergies:   Other                   [employer] Statement Selected

## 2021-03-16 ENCOUNTER — APPOINTMENT (OUTPATIENT)
Dept: CARDIOLOGY | Facility: CLINIC | Age: 51
End: 2021-03-16

## 2021-03-17 ENCOUNTER — APPOINTMENT (OUTPATIENT)
Dept: OPHTHALMOLOGY | Facility: CLINIC | Age: 51
End: 2021-03-17

## 2021-03-23 ENCOUNTER — APPOINTMENT (OUTPATIENT)
Dept: CARDIOLOGY | Facility: CLINIC | Age: 51
End: 2021-03-23

## 2021-04-02 NOTE — PRE-OP CHECKLIST - TAMPON REMOVED
[FreeTextEntry6] : Patient is a 86 year old female with a right posterior shoulder lipoma measuring approximately 4 x 4 cm.  \par \par The area was prepped and draped in the usual fashion.  Local anesthetic was administered using 1% lidocaine with epinephrine.\par \par The lipoma was sharply excised.  Area was irrigated copiously.  Complex wound closure was performed in layers.  The wound measured approximately 5.2  cm.\par \par Sterile dressing applied.  \par \par Patient tolerated procedure well and understands post-op instructions.\par \par Sutures Used:  2-0 polysorb, 3-0 Nylon\par \par Due to COVID 19, pre-visit patient instructions were explained to the patient and their symptoms were checked upon arrival.  \par Masks were used by the health care providers and staff and the examination room was cleaned after the patient visit was completed.\par 
n/a

## 2021-04-08 ENCOUNTER — APPOINTMENT (OUTPATIENT)
Dept: OPHTHALMOLOGY | Facility: CLINIC | Age: 51
End: 2021-04-08

## 2021-05-04 ENCOUNTER — APPOINTMENT (OUTPATIENT)
Dept: INTERNAL MEDICINE | Facility: CLINIC | Age: 51
End: 2021-05-04

## 2021-06-09 NOTE — DISCHARGE NOTE ADULT - DEVELOP COPING SKILLS. FOR EXAMPLE, STRATEGIES AND LIFESTYLE CHANGES THAT REDUCE NEGATIVE MOODS, STRESS, AND EXPOSURE TO SMOKING CUES
Infectious Disease Progress Note    Assessment/Plan  Impression:  COVID-19 pneumonia   Received CCP. Started on dexamethasone (6/22-29) and remdesivir for 10 day course  Received 400 mg tocilizumab on 6/25, 6/27 for suspected cytokine release syndrome.     Quantiferon gold negative  Hep B and C negative  Strongyloides antibody negative -- no need for further ivermectin    Ongoing fever and leukocytosis. Steroid may contribute to leukocytosis.     Colonized with yeast in sputum. Risk for invasive candidiasis -- broad spectrum antibiotics, central line, candida colonization. Beta-D-glucan negative 6/29 -- less likely therefore that he has invasive candidiasis.     Now with MSSA in sputum as of 7/2 -- could be the cause of pneumonia. On broad spectrum antibiotics. He was on vancomycin and lowish dose cefepime when this sputum was collected.     Recommend:    Likely focus to cefazolin 7/7.       Principal Problem:    COVID-19  Active Problems:    Shock (H)    Acute and chronic respiratory failure with hypoxia (H)    On mechanically assisted ventilation (H)    ARDS (adult respiratory distress syndrome) (H)    Atrial fibrillation with rapid ventricular response (H)    Atrial fibrillation with rapid ventricular response (H)    Raphael Mantilla MD  [facility]  [phone] [facility] paging  ______________________________________________________________________       Subjective  Intubated and sedated in ICU. Has been on cooling blanket although off now. On pressor.       Objective    Anti-infectives:  Meropenem 7/2-  Ceftriaxone 6/22  Ivermectin 6/25-26  Cefepime 6/27-7/2  Vancomycin 6/27-    Vital signs in last 24 hours  Temp:  [97.6  F (36.4  C)-99.3  F (37.4  C)] 97.6  F (36.4  C)  Heart Rate:  [54-72] 66  Resp:  [18-20] 18  BP: (120-121)/(55-61) 120/55  Arterial Line BP: ()/(44-75) 119/55  FiO2 (%):  [40 %-50 %] 50 %  Weight:   (!) 284 lb 9.8 oz (129.1 kg)    Intake/Output last 3  shifts  I/O last 3 completed shifts:  In: 4574.5 [I.V.:2091.5; NG/GT:1003; IV Piggyback:1480]  Out: 3595 [Urine:1995; Stool:1600]  Intake/Output this shift:  I/O this shift:  In: 150 [NG/GT:150]  Out: 275 [Urine:275]    Review of Systems   Review of systems not obtained due to inability to communicate with the patient. , otherwise negative.    Physical Exam--  General appearance: critically ill in ICU, on vent  Eyes: eyes closed.  Throat: oral ett inplace.  Neck: short neck-difficult to examine  tachy  Abdomen: not distended.  Extremities: extremities normal, atraumatic, no cyanosis or edema  Skin: Skin color, texture, turgor normal. No rashes or lesions  Neurologic: Mental status: sedated  PICC R UE  barlow    Pertinent Labs   Lab Results: personally reviewed.     Results from last 7 days   Lab Units 07/06/20  0352 07/05/20  0440 07/04/20 0515   LN-WHITE BLOOD CELL COUNT thou/uL 8.5 12.8* 9.2   LN-HEMOGLOBIN g/dL 11.1* 12.3* 12.3*   LN-HEMATOCRIT % 36.2* 40.4 40.2   LN-PLATELET COUNT thou/uL 143 179 155        Results from last 7 days   Lab Units 07/06/20  0352 07/05/20  0440 07/04/20 0515   LN-SODIUM mmol/L 144 142 142   LN-POTASSIUM mmol/L 3.7 3.8 4.2   LN-CHLORIDE mmol/L 111* 109* 109*   LN-CO2 mmol/L 28 27 29   LN-BLOOD UREA NITROGEN mg/dL 16 18 21   LN-CREATININE mg/dL 0.50* 0.60* 0.63*   LN-CALCIUM mg/dL 7.4* 7.4* 7.3*     Lab Results   Component Value Date    ALT 27 07/06/2020    AST 30 07/06/2020    ALKPHOS 44 (L) 07/06/2020    BILITOT 0.3 07/06/2020     Micro:  6/22 blood negative  6/24 sputum usual bruno and yeast  6/27 blood including fungal isolate no growth to date   6/27 sputum gram stain with yeast; culture usual bruno  7/2 sputum MSSA  7/2 blood culture negative to date  7/4 sputum Coag positive staph    Results for RJ GUADALUPE (MRN [id number]) as of 6/26/2020 11:04   Ref. Range 6/24/2020 16:46   Hepatitis B Surface Ag Latest Ref Range: Negative  Negative   Hepatitis C Ab Latest Ref Range:  Negative  Negative   Results for RJ GUADALUPE (MRN [id number]) as of 6/26/2020 11:04   Ref. Range 6/23/2020 04:26 6/24/2020 05:24 6/24/2020 16:46 6/25/2020 04:40 6/26/2020 04:22   CRP Latest Ref Range: 0.0 - 0.8 mg/dL 12.0 (H) 10.0 (H)  4.2 (H) 2.1 (H)   LD (LDH) Latest Ref Range: 125 - 220 U/L 480 (H) 477 (H)  434 (H) 452 (H)     Pertinent Radiology   Radiology Results: Personally reviewed image/s and Personally reviewed impression/s    Xr Chest 1 View Portable    Result Date: 7/5/2020  EXAM: XR CHEST 1 VIEW PORTABLE LOCATION: [facility] DATE/TIME: 7/5/2020 12:25 PM INDICATION: ETT, Covid COMPARISON: 07/04/2020     Endotracheal tube tip mid way between clavicles and melissa. Right PICC tip projects over the mid SVC. Feeding tube tip below the film. Limited inspiratory volume. Normal heart size. Unchanged bilateral diffuse lung infiltrates.               Statement Selected

## 2021-06-18 ENCOUNTER — APPOINTMENT (OUTPATIENT)
Dept: CARDIOLOGY | Facility: CLINIC | Age: 51
End: 2021-06-18

## 2021-07-02 ENCOUNTER — NON-APPOINTMENT (OUTPATIENT)
Age: 51
End: 2021-07-02

## 2021-07-02 ENCOUNTER — APPOINTMENT (OUTPATIENT)
Dept: CARDIOLOGY | Facility: CLINIC | Age: 51
End: 2021-07-02
Payer: COMMERCIAL

## 2021-07-02 PROCEDURE — 93296 REM INTERROG EVL PM/IDS: CPT

## 2021-07-02 PROCEDURE — 93294 REM INTERROG EVL PM/LDLS PM: CPT

## 2021-08-07 ENCOUNTER — APPOINTMENT (OUTPATIENT)
Dept: INTERNAL MEDICINE | Facility: CLINIC | Age: 51
End: 2021-08-07

## 2021-08-08 ENCOUNTER — EMERGENCY (EMERGENCY)
Facility: HOSPITAL | Age: 51
LOS: 0 days | Discharge: HOME | End: 2021-08-08
Attending: STUDENT IN AN ORGANIZED HEALTH CARE EDUCATION/TRAINING PROGRAM | Admitting: STUDENT IN AN ORGANIZED HEALTH CARE EDUCATION/TRAINING PROGRAM
Payer: MEDICAID

## 2021-08-08 VITALS
TEMPERATURE: 97 F | WEIGHT: 220.02 LBS | RESPIRATION RATE: 18 BRPM | SYSTOLIC BLOOD PRESSURE: 126 MMHG | OXYGEN SATURATION: 97 % | HEIGHT: 75 IN | DIASTOLIC BLOOD PRESSURE: 88 MMHG | HEART RATE: 69 BPM
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
## 2021-08-08 DIAGNOSIS — T82.897A OTHER SPECIFIED COMPLICATION OF CARDIAC PROSTHETIC DEVICES, IMPLANTS AND GRAFTS, INITIAL ENCOUNTER: ICD-10-CM

## 2021-08-08 DIAGNOSIS — Z79.899 OTHER LONG TERM (CURRENT) DRUG THERAPY: ICD-10-CM

## 2021-08-08 DIAGNOSIS — Z95.0 PRESENCE OF CARDIAC PACEMAKER: ICD-10-CM

## 2021-08-08 DIAGNOSIS — Z86.59 PERSONAL HISTORY OF OTHER MENTAL AND BEHAVIORAL DISORDERS: ICD-10-CM

## 2021-08-08 DIAGNOSIS — Z20.822 CONTACT WITH AND (SUSPECTED) EXPOSURE TO COVID-19: ICD-10-CM

## 2021-08-08 DIAGNOSIS — F17.200 NICOTINE DEPENDENCE, UNSPECIFIED, UNCOMPLICATED: ICD-10-CM

## 2021-08-08 DIAGNOSIS — I44.0 ATRIOVENTRICULAR BLOCK, FIRST DEGREE: ICD-10-CM
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
## 2021-08-08 LAB
ALBUMIN SERPL ELPH-MCNC: 4.4 G/DL — SIGNIFICANT CHANGE UP (ref 3.5–5.2)
ALP SERPL-CCNC: 114 U/L — SIGNIFICANT CHANGE UP (ref 30–115)
ALT FLD-CCNC: 20 U/L — SIGNIFICANT CHANGE UP (ref 0–41)
ANION GAP SERPL CALC-SCNC: 8 MMOL/L — SIGNIFICANT CHANGE UP (ref 7–14)
AST SERPL-CCNC: 27 U/L — SIGNIFICANT CHANGE UP (ref 0–41)
BASOPHILS # BLD AUTO: 0.08 K/UL — SIGNIFICANT CHANGE UP (ref 0–0.2)
BASOPHILS NFR BLD AUTO: 1 % — SIGNIFICANT CHANGE UP (ref 0–1)
BILIRUB SERPL-MCNC: <0.2 MG/DL — SIGNIFICANT CHANGE UP (ref 0.2–1.2)
BUN SERPL-MCNC: 16 MG/DL — SIGNIFICANT CHANGE UP (ref 10–20)
CALCIUM SERPL-MCNC: 9.3 MG/DL — SIGNIFICANT CHANGE UP (ref 8.5–10.1)
CHLORIDE SERPL-SCNC: 101 MMOL/L — SIGNIFICANT CHANGE UP (ref 98–110)
CO2 SERPL-SCNC: 28 MMOL/L — SIGNIFICANT CHANGE UP (ref 17–32)
CREAT SERPL-MCNC: 1.2 MG/DL — SIGNIFICANT CHANGE UP (ref 0.7–1.5)
EOSINOPHIL # BLD AUTO: 0.23 K/UL — SIGNIFICANT CHANGE UP (ref 0–0.7)
EOSINOPHIL NFR BLD AUTO: 3 % — SIGNIFICANT CHANGE UP (ref 0–8)
GLUCOSE SERPL-MCNC: 98 MG/DL — SIGNIFICANT CHANGE UP (ref 70–99)
HCT VFR BLD CALC: 45.7 % — SIGNIFICANT CHANGE UP (ref 42–52)
HGB BLD-MCNC: 15.5 G/DL — SIGNIFICANT CHANGE UP (ref 14–18)
IMM GRANULOCYTES NFR BLD AUTO: 0.5 % — HIGH (ref 0.1–0.3)
LYMPHOCYTES # BLD AUTO: 1.66 K/UL — SIGNIFICANT CHANGE UP (ref 1.2–3.4)
LYMPHOCYTES # BLD AUTO: 21.5 % — SIGNIFICANT CHANGE UP (ref 20.5–51.1)
MAGNESIUM SERPL-MCNC: 2 MG/DL — SIGNIFICANT CHANGE UP (ref 1.8–2.4)
MCHC RBC-ENTMCNC: 29.6 PG — SIGNIFICANT CHANGE UP (ref 27–31)
MCHC RBC-ENTMCNC: 33.9 G/DL — SIGNIFICANT CHANGE UP (ref 32–37)
MCV RBC AUTO: 87.2 FL — SIGNIFICANT CHANGE UP (ref 80–94)
MONOCYTES # BLD AUTO: 0.56 K/UL — SIGNIFICANT CHANGE UP (ref 0.1–0.6)
MONOCYTES NFR BLD AUTO: 7.3 % — SIGNIFICANT CHANGE UP (ref 1.7–9.3)
NEUTROPHILS # BLD AUTO: 5.14 K/UL — SIGNIFICANT CHANGE UP (ref 1.4–6.5)
NEUTROPHILS NFR BLD AUTO: 66.7 % — SIGNIFICANT CHANGE UP (ref 42.2–75.2)
NRBC # BLD: 0 /100 WBCS — SIGNIFICANT CHANGE UP (ref 0–0)
PLATELET # BLD AUTO: 198 K/UL — SIGNIFICANT CHANGE UP (ref 130–400)
POTASSIUM SERPL-MCNC: 4.5 MMOL/L — SIGNIFICANT CHANGE UP (ref 3.5–5)
POTASSIUM SERPL-SCNC: 4.5 MMOL/L — SIGNIFICANT CHANGE UP (ref 3.5–5)
PROT SERPL-MCNC: 7.3 G/DL — SIGNIFICANT CHANGE UP (ref 6–8)
RBC # BLD: 5.24 M/UL — SIGNIFICANT CHANGE UP (ref 4.7–6.1)
RBC # FLD: 13 % — SIGNIFICANT CHANGE UP (ref 11.5–14.5)
SARS-COV-2 RNA SPEC QL NAA+PROBE: SIGNIFICANT CHANGE UP
SODIUM SERPL-SCNC: 137 MMOL/L — SIGNIFICANT CHANGE UP (ref 135–146)
TROPONIN T SERPL-MCNC: <0.01 NG/ML — SIGNIFICANT CHANGE UP
WBC # BLD: 7.71 K/UL — SIGNIFICANT CHANGE UP (ref 4.8–10.8)
WBC # FLD AUTO: 7.71 K/UL — SIGNIFICANT CHANGE UP (ref 4.8–10.8)

## 2021-08-08 PROCEDURE — 99285 EMERGENCY DEPT VISIT HI MDM: CPT

## 2021-08-08 PROCEDURE — 71045 X-RAY EXAM CHEST 1 VIEW: CPT | Mod: 26

## 2021-08-08 PROCEDURE — 93010 ELECTROCARDIOGRAM REPORT: CPT

## 2021-08-08 PROCEDURE — 99223 1ST HOSP IP/OBS HIGH 75: CPT

## 2021-08-08 NOTE — ED PROVIDER NOTE - NS ED ROS FT
Review of Systems:  CONSTITUTIONAL: No fever, No diaphoresis   SKIN: No rash  HEMATOLOGIC: No abnormal bleeding or bruising  EYES: No eye pain, No blurred vision  ENT:  No sore throat, No neck pain, No rhinorrhea   RESPIRATORY: No shortness of breath, No cough  CARDIAC: No chest pain, No palpitations  GI: No abdominal pain, No nausea, No vomiting, No diarrhea, No constipation, No bright red blood per rectum or melena. No flank pain  : No dysuria, frequency, hematuria.   MUSCULOSKELETAL: No joint paint, No swelling, No back pain  NEUROLOGIC: No numbness, No focal weakness, No headache, No dizziness  All other systems negative, unless specified in HPI

## 2021-08-08 NOTE — ED PROVIDER NOTE - CARE PROVIDER_API CALL
Emil Loza; JEREMIAS)  [facility]  [address] [place] [address]  Phone: [phone]  Fax: [phone]  Follow Up Time: 1-3 Days

## 2021-08-08 NOTE — ED PROVIDER NOTE - PROGRESS NOTE DETAILS
DL - Spoke with cardio fellow, coming to evaluate. SR; cardiology fellow bedside. DL - Per cardiology, pacemaker functioning appropriately. Will DC with strict return precautions and follow up.

## 2021-08-08 NOTE — CONSULT NOTE ADULT - ASSESSMENT
51y M hx of CHB s/p Medtronic dual chamber pacemaker placement, substance abuse presented with cc of fatigue. EP consulted for suspected PPM malfunctioning.    - Bedside interrogation performed. PM threshold lower rate at 60BPM. Mode is DDD. Pacemaker is sensing and pacing well without apparent issues.   - Will work patient up for additional etiology of fatigue per primary team.  - At the time of this eval, patient insists on being discharged home. If patient discharged to home, will need close outpatient f/u with EP.  - Will discuss plan with attending electrophysiologist.

## 2021-08-08 NOTE — ED PROVIDER NOTE - NSFOLLOWUPINSTRUCTIONS_ED_ALL_ED_FT
Please follow up with your cardiologist and EP doctor. Return if having chest pain, shortness of breath, fevers, vomiting.

## 2021-08-08 NOTE — ED PROVIDER NOTE - CLINICAL SUMMARY MEDICAL DECISION MAKING FREE TEXT BOX
51 year old male with a pmh of polysubstance abuse & a PPM placed in July 2020 ([facility]) presents here for feeling "tired". patient states he was working felt tired, check his hr and noticed it was 57 and was concerned his pacemaker wasn't working. Patient has not seen his EP in ~6 months. No recent fever chills dizziness cp sob n/v/d abdominal pain or urinary complaints. Not vaccinated from COVID19. VS reviewed. Labs imaging ekg obtained and reviewed. Cardiology consult placed, interrogetated pacemaker and reports no problem. Patient a spoken to in detail about results  All questions addressed.  Results of ED work up discussed and patient given a copy of the results. Patient has proper follow up. Return precautions given.

## 2021-08-08 NOTE — ED PROVIDER NOTE - PATIENT PORTAL LINK FT
You can access the FollowMyHealth Patient Portal offered by [facility] by registering at the following website: [email or web address]. By joining [facility]’s FollowMyHealth portal, you will also be able to view your health information using other applications (apps) compatible with our system.

## 2021-08-08 NOTE — ED PROVIDER NOTE - PHYSICAL EXAMINATION
CONSTITUTIONAL: Well-developed; well-nourished; in no acute distress.   SKIN: warm, dry  HEAD: Normocephalic; atraumatic.  EYES: no conjunctival injection. EOMI.   ENT: No nasal discharge; airway clear.  NECK: Supple; non tender.  CARD: S1, S2 normal; Regular rate and rhythm.   RESP: No wheezes, rales or rhonchi.  ABD: soft ntnd.    EXT: Normal ROM.  No clubbing, cyanosis or edema.   LYMPH: No acute cervical adenopathy.  NEURO: Alert, oriented, grossly unremarkable.  PSYCH: Cooperative, appropriate.

## 2021-08-08 NOTE — ED PROVIDER NOTE - ATTENDING CONTRIBUTION TO CARE
I personally evaluated the patient. I reviewed the Resident’s or Physician Assistant’s note (as assigned above), and agree with the findings and plan except as documented in my note.  51 year old male with a pmh of polysubstance abuse & a PPM placed in July 2020 ([facility]) presents here for feeling "tired". patient states he was working felt tired, check his hr and noticed it was 57 and was concerned his pacemaker wasn't working. Patient has not seen his EP in ~6 months. No recent fever chills dizziness cp sob n/v/d abdominal pain or urinary complaints. Not vaccinated from COVID19.  On exam  CONSTITUTIONAL: WA / WN / NAD  HEAD: NCAT  EYES: PERRL; EOMI;   ENT: Normal pharynx; mucous membranes pink/moist, no erythema.  NECK: Supple; no meningeal signs  CARD: RRR; nl S1/S2; no M/R/G.  RESP: Respiratory rate and effort are normal; breath sounds clear and equal bilaterally.  ABD: Soft, NT ND  MSK/EXT: No gross deformities; full range of motion.  SKIN: Warm and dry;   NEURO: AAOx3  PSYCH: Memory Intact, Normal Affect

## 2021-08-08 NOTE — CONSULT NOTE ADULT - SUBJECTIVE AND OBJECTIVE BOX
Patient is a 51y old  Male who presents with a chief complaint of fagitue    HPI: 51y M hx of CHB s/p Medtronic dual chamber pacemaker placement, substance abuse presented with cc of fatigue. Patient described feeling tired in the past few days; however, he continued to play his routine softball games at leisure. Patient noted on home pulseox HR 57 earlier today and he was worried about PM malfunctioning. Patient subsequently came to [facility] ED. Patient denied feeling chest pain, CLARKE, palpitation or hearing PPM alarm at home. Patient reports last f/u in EP clinic 6 months ago.    PAST MEDICAL & SURGICAL HISTORY:  Alcoholism  clean 1 yr    Illicit drug use  pt denies    Neutropenia  pt denies    History of bunionectomy    No significant past surgical history    PREVIOUS DIAGNOSTIC TESTING:    EKG: NSR at 60 HR    FAMILY HISTORY:  FH: diabetes mellitus    SOCIAL HISTORY:  Hx of cocaine abuse  CIGARETTES: daily 1 pack /day x 15 years    ALCOHOL: denied    REVIEW OF SYSTEMS:  CONSTITUTIONAL: No fever, weight loss; + fatigue at times  EYES: No eye pain, visual disturbances, or discharge  ENMT:  No difficulty hearing, tinnitus, vertigo; No sinus or throat pain  NECK: No pain or stiffness  BREASTS: No pain, masses, or nipple discharge  RESPIRATORY: No cough, wheezing, hemoptysis, or shortness of breath  CARDIOVASCULAR: No chest pain, dyspnea, palpitations, dizziness, syncope, paroxysmal nocturnal dyspnea, orthopnea, or arm or leg swelling  GASTROINTESTINAL: No abdominal  or epigastric pain, nausea, vomiting, hematemesis, diarrhea, constipation, melena or bright red blood.  GENITOURINARY: No dysuria, nocturia, hematuria, or urinary incontinence  NEUROLOGICAL: No headaches, memory loss, slurred speech, limb weakness, loss of strength, numbness, or tremors  SKIN: No itching, burning, rashes, or lesions   LYMPH NODES: No enlarged glands  ENDOCRINE: No heat or cold intolerance, or hair loss  MUSCULOSKELETAL: No joint pain or swelling, muscle, back, or extremity pain  PSYCHIATRIC: No depression, anxiety, or difficulty sleeping  HEME/LYMPH: No easy bruising or bleeding gums  ALLERY AND IMMUNOLOGIC: No hives or rash.      Vital Signs Last 24 Hrs  T(C): 36.2 (08 Aug 2021 18:49), Max: 36.2 (08 Aug 2021 18:49)  T(F): 97.2 (08 Aug 2021 18:49), Max: 97.2 (08 Aug 2021 18:49)  HR: 69 (08 Aug 2021 18:49) (69 - 69)  BP: 126/88 (08 Aug 2021 18:49) (126/88 - 126/88)  BP(mean): --  RR: 18 (08 Aug 2021 18:49) (18 - 18)  SpO2: 97% (08 Aug 2021 18:49) (97% - 97%)    PHYSICAL EXAM:    GENERAL: In no apparent distress, well nourished, and hydrated.  HEAD:  Atraumatic, Normocephalic  EYES: EOMI, PERRLA, conjunctiva and sclera clear  ENMT: No tonsillar erythema, exudates, or enlargements; ist mucous membranes, Good dentition, No lesions  NECK: Supple and normal thyroid.  No JVD or carotid bruit.  Carotid pulse is 2+ bilaterally.  HEART: Regular rate and rhythm; No murmurs, rubs, or gallops.  PULMONARY: Clear to auscultation and perfusion.  No rales, wheezing, or rhonchi bilaterally.  ABDOMEN: Soft, Nontender, Nondistended; Bowel sounds present  EXTREMITIES:  2+ Peripheral Pulses, No clubbing, cyanosis, or edema  LYMPH: No lymphadenopathy noted  NEUROLOGICAL: Grossly nonfocal    INTERPRETATION OF TELEMETRY:    ECG: NSR    LABS:                        15.5   7.71  )-----------( 198      ( 08 Aug 2021 19:10 )             45.7     08-08    137  |  101  |  16  ----------------------------<  98  4.5   |  28  |  1.2    Ca    9.3      08 Aug 2021 19:10  Mg     2.0     08-08    TPro  7.3  /  Alb  4.4  /  TBili  <0.2  /  DBili  x   /  AST  27  /  ALT  20  /  AlkPhos  114  08-08    CARDIAC MARKERS ( 08 Aug 2021 19:10 )  x     / <0.01 ng/mL / x     / x     / x         Patient is a 51y old  Male who presents with a chief complaint of fagitue    HPI: 51y M hx of CHB s/p Medtronic dual chamber pacemaker placement, substance abuse presented with cc of fatigue. Patient described feeling tired in the past few days; however, he continued to play his routine softball games at leisure. Patient noted on home pulseox HR 57 earlier today and he was worried about PM malfunctioning. Patient subsequently came to [facility] ED. Patient denied feeling chest pain, CLARKE, palpitation or hearing PPM alarm at home. Patient reports last f/u in EP clinic 6 months ago.    PAST MEDICAL & SURGICAL HISTORY:  Alcoholism  clean 1 yr    Illicit drug use  pt denies    Neutropenia  pt denies    History of bunionectomy    No significant past surgical history    PREVIOUS DIAGNOSTIC TESTING:    EKG: NSR at 60 HR    FAMILY HISTORY:  FH: diabetes mellitus    SOCIAL HISTORY:  Hx of cocaine abuse  CIGARETTES: daily 1 pack /day x 15 years    ALCOHOL: denied    REVIEW OF SYSTEMS:  CONSTITUTIONAL: No fever, weight loss; + fatigue at times  EYES: No eye pain, visual disturbances, or discharge  ENMT:  No difficulty hearing, tinnitus, vertigo; No sinus or throat pain  NECK: No pain or stiffness  BREASTS: No pain, masses, or nipple discharge  RESPIRATORY: No cough, wheezing, hemoptysis, or shortness of breath  CARDIOVASCULAR: No chest pain, dyspnea, palpitations, dizziness, syncope, paroxysmal nocturnal dyspnea, orthopnea, or arm or leg swelling  GASTROINTESTINAL: No abdominal  or epigastric pain, nausea, vomiting, hematemesis, diarrhea, constipation, melena or bright red blood.  GENITOURINARY: No dysuria, nocturia, hematuria, or urinary incontinence  NEUROLOGICAL: No headaches, memory loss, slurred speech, limb weakness, loss of strength, numbness, or tremors  SKIN: No itching, burning, rashes, or lesions   LYMPH NODES: No enlarged glands  ENDOCRINE: No heat or cold intolerance, or hair loss  MUSCULOSKELETAL: No joint pain or swelling, muscle, back, or extremity pain  PSYCHIATRIC: No depression, anxiety, or difficulty sleeping  HEME/LYMPH: No easy bruising or bleeding gums  ALLERY AND IMMUNOLOGIC: No hives or rash.      Vital Signs Last 24 Hrs  T(C): 36.2 (08 Aug 2021 18:49), Max: 36.2 (08 Aug 2021 18:49)  T(F): 97.2 (08 Aug 2021 18:49), Max: 97.2 (08 Aug 2021 18:49)  HR: 69 (08 Aug 2021 18:49) (69 - 69)  BP: 126/88 (08 Aug 2021 18:49) (126/88 - 126/88)  BP(mean): --  RR: 18 (08 Aug 2021 18:49) (18 - 18)  SpO2: 97% (08 Aug 2021 18:49) (97% - 97%)    PHYSICAL EXAM:    GENERAL: In no apparent distress, well nourished, and hydrated.  HEAD:  Atraumatic, Normocephalic  EYES: EOMI, PERRLA, conjunctiva and sclera clear  ENMT: No tonsillar erythema, exudates, or enlargements; ist mucous membranes, Good dentition, No lesions  NECK: Supple and normal thyroid.  No JVD or carotid bruit.  Carotid pulse is 2+ bilaterally.  HEART: Regular rate and rhythm; No murmurs, rubs, or gallops.  PULMONARY: Clear to auscultation and perfusion.  No rales, wheezing, or rhonchi bilaterally.  ABDOMEN: Soft, Nontender, Nondistended; Bowel sounds present  EXTREMITIES:  2+ Peripheral Pulses, No clubbing, cyanosis, or edema  LYMPH: No lymphadenopathy noted  NEUROLOGICAL: Grossly nonfocal    INTERPRETATION OF TELEMETRY:    ECG: NSR    LABS:                        15.5   7.71  )-----------( 198      ( 08 Aug 2021 19:10 )             45.7     08-08    137  |  101  |  16  ----------------------------<  98  4.5   |  28  |  1.2    Ca    9.3      08 Aug 2021 19:10  Mg     2.0     08-08    TPro  7.3  /  Alb  4.4  /  TBili  <0.2  /  DBili  x   /  AST  27  /  ALT  20  /  AlkPhos  114  08-08    CARDIAC MARKERS ( 08 Aug 2021 19:10 )  x     / <0.01 ng/mL / x     / x     / x

## 2021-08-08 NOTE — CONSULT NOTE ADULT - ATTENDING COMMENTS
PPM interrogation showed normally functioning dual chamber PPM  Further management as per primary team  OP f-up with EP

## 2021-08-08 NOTE — ED ADULT NURSE NOTE - OBJECTIVE STATEMENT
Patient c/o low heart rate- 57 at home and increased fatigue today. Patient has pacemaker in which is set at 70. Patient had pacemaker placed July 2020. pt. alert and oriented times four. vss. labs drawn and sent. pt. placed on cardiac monitor HR 71 SR. Spouse at bedside. will monitor.

## 2021-08-08 NOTE — ED ADULT TRIAGE NOTE - CHIEF COMPLAINT QUOTE
Patient c/o low heart rate- 57 at home and increased fatigue today. Patient has pacemaker in which is set at 70. Patient had pacemaker placed July 2020.

## 2021-08-08 NOTE — ED PROVIDER NOTE - OBJECTIVE STATEMENT
50 y/o M PMHx of former cocaine and alcohol abuse, PPM placed 2020 [facility] (EF 40% at that time) presents to ED with low heart rate. Patient reports that he has been working a lot the past few days, checked his heart rate because he felt tired and noticed it was in the 40s and was worried that his pacemaker was not working. Denies chest pain, palpitations, cough, SOB, nausea, vomiting, fevers.

## 2021-10-01 ENCOUNTER — NON-APPOINTMENT (OUTPATIENT)
Age: 51
End: 2021-10-01

## 2021-10-01 ENCOUNTER — APPOINTMENT (OUTPATIENT)
Dept: CARDIOLOGY | Facility: CLINIC | Age: 51
End: 2021-10-01
Payer: COMMERCIAL

## 2021-10-01 PROCEDURE — 93296 REM INTERROG EVL PM/IDS: CPT | Mod: NC

## 2021-10-01 PROCEDURE — 93294 REM INTERROG EVL PM/LDLS PM: CPT

## 2022-01-02 ENCOUNTER — NON-APPOINTMENT (OUTPATIENT)
Age: 52
End: 2022-01-02

## 2022-01-03 ENCOUNTER — APPOINTMENT (OUTPATIENT)
Dept: CARDIOLOGY | Facility: CLINIC | Age: 52
End: 2022-01-03
Payer: COMMERCIAL

## 2022-01-03 PROCEDURE — 93296 REM INTERROG EVL PM/IDS: CPT

## 2022-01-03 PROCEDURE — 93294 REM INTERROG EVL PM/LDLS PM: CPT

## 2022-03-08 NOTE — PRE-ANESTHESIA EVALUATION ADULT - NSANTHINDVINFOSD_GEN_ALL_CORE
What Type Of Note Output Would You Prefer (Optional)?: Standard Output Hpi Title: Evaluation of Skin Lesions patient

## 2022-04-05 ENCOUNTER — NON-APPOINTMENT (OUTPATIENT)
Age: 52
End: 2022-04-05

## 2022-04-05 ENCOUNTER — APPOINTMENT (OUTPATIENT)
Dept: CARDIOLOGY | Facility: CLINIC | Age: 52
End: 2022-04-05
Payer: COMMERCIAL

## 2022-04-05 PROCEDURE — 93296 REM INTERROG EVL PM/IDS: CPT

## 2022-04-05 PROCEDURE — 93294 REM INTERROG EVL PM/LDLS PM: CPT

## 2022-04-20 NOTE — ED ADULT NURSE NOTE - CAS DISCH ACCOMP BY
[facility]  Phone: [phone] Fax: [phone]     Physical Therapy  Cancellation/No-show Note  Patient Name:  Gerson Ramsey  :  1950   Date:  2022    For today's appointment patient:  [x]  Cancelled  [x]  Rescheduled appointment  []  No-show     Reason given by patient:  []  Patient ill  []  Conflicting appointment  []  No transportation    []  Conflict with work  []  No reason given  [x]  Other: forgot swim trunks    Comments:  RS next week    Electronically signed by:  Mable Cunningham  [facility] [id number]

## 2022-06-06 NOTE — ED PROVIDER NOTE - MDM ORDERS SUBMITTED SELECTION
Chronic migraine with and without aura, intractable with superimposed tension component. Unchanged intensity and frequency. Suboptimal frequency and intensity control despite botox, CGRP antagonist abortive and traditional prophylaxis. Stressors contributing to her headache frequency.      Labs/EKG/Imaging Studies

## 2022-06-29 NOTE — ED ADULT NURSE NOTE - CHIEF COMPLAINT QUOTE
"I am here for detox from cocaine and alcohol."
balance training/bed mobility training/gait training/strengthening/transfer training

## 2022-07-05 ENCOUNTER — APPOINTMENT (OUTPATIENT)
Dept: CARDIOLOGY | Facility: CLINIC | Age: 52
End: 2022-07-05

## 2022-07-05 ENCOUNTER — NON-APPOINTMENT (OUTPATIENT)
Age: 52
End: 2022-07-05

## 2022-07-05 PROCEDURE — 93296 REM INTERROG EVL PM/IDS: CPT

## 2022-07-05 PROCEDURE — 93294 REM INTERROG EVL PM/LDLS PM: CPT

## 2022-07-19 ENCOUNTER — APPOINTMENT (OUTPATIENT)
Dept: CARDIOLOGY | Facility: CLINIC | Age: 52
End: 2022-07-19

## 2022-08-15 ENCOUNTER — APPOINTMENT (OUTPATIENT)
Dept: CARDIOLOGY | Facility: CLINIC | Age: 52
End: 2022-08-15

## 2022-08-15 VITALS
SYSTOLIC BLOOD PRESSURE: 119 MMHG | TEMPERATURE: 98.1 F | DIASTOLIC BLOOD PRESSURE: 80 MMHG | WEIGHT: 215 LBS | HEART RATE: 75 BPM | BODY MASS INDEX: 26.73 KG/M2 | RESPIRATION RATE: 16 BRPM | HEIGHT: 75 IN

## 2022-08-15 PROCEDURE — 93280 PM DEVICE PROGR EVAL DUAL: CPT

## 2022-08-15 PROCEDURE — 93000 ELECTROCARDIOGRAM COMPLETE: CPT | Mod: 59

## 2022-08-15 PROCEDURE — 99214 OFFICE O/P EST MOD 30 MIN: CPT

## 2022-08-15 RX ORDER — ALBUTEROL SULFATE 90 UG/1
108 (90 BASE) AEROSOL, METERED RESPIRATORY (INHALATION) 3 TIMES DAILY
Qty: 1 | Refills: 2 | Status: ACTIVE | COMMUNITY
Start: 2020-12-29

## 2022-08-15 RX ORDER — METOPROLOL SUCCINATE 25 MG/1
25 TABLET, EXTENDED RELEASE ORAL DAILY
Qty: 30 | Refills: 3 | Status: COMPLETED | COMMUNITY
Start: 2020-10-27 | End: 2022-08-15

## 2022-08-15 NOTE — ASSESSMENT
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 Battery life ok. No new events.\par - TTE to assess EF and valve\par - Remote Monitoring\par - Return in 6 months

## 2022-08-15 NOTE — HISTORY OF PRESENT ILLNESS
[de-identified] : Mr. 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

## 2022-08-15 NOTE — PROCEDURE
[No] : not [Sinus Bradycardia] : sinus bradycardia [See Scanned Paceart Report] : See scanned paceart report [See Device Printout] : See device printout [Pacemaker] : pacemaker [DDD] : DDD [Voltage: ___ volts] : Voltage was [unfilled] volts [Longevity: ___ months] : The estimated remaining battery life is [unfilled] months [Threshold Testing Performed] : Threshold testing was performed [Atrial] : Atrial [Ventricular] : Ventricular [Lead Imp:  ___ohms] : lead impedance was [unfilled] ohms [Sensing Amplitude ___mv] : sensing amplitude was [unfilled] mv [___V @] : [unfilled] V [___ ms] : [unfilled] ms [Counters Reset] : the counters were reset [[facility]-identified] [facility]  [de-identified] : [id number] [de-identified] : [id number] [de-identified] : 7/20/2020 [de-identified] : 60 [de-identified] : 530 episodes of ST - all during the day time, correlating with physical exertion\par 2 NSVT episodes for 2 seconds (9/2/21 & 8/12/22)\par AP 35%,  41% . Baseline rhythm - 1 AVB at 64 bpm.

## 2022-08-26 ENCOUNTER — APPOINTMENT (OUTPATIENT)
Dept: CARDIOLOGY | Facility: CLINIC | Age: 52
End: 2022-08-26

## 2022-10-04 ENCOUNTER — APPOINTMENT (OUTPATIENT)
Dept: CARDIOLOGY | Facility: CLINIC | Age: 52
End: 2022-10-04

## 2022-10-04 ENCOUNTER — NON-APPOINTMENT (OUTPATIENT)
Age: 52
End: 2022-10-04

## 2022-10-04 PROCEDURE — 93296 REM INTERROG EVL PM/IDS: CPT

## 2022-10-04 PROCEDURE — 93294 REM INTERROG EVL PM/LDLS PM: CPT

## 2022-12-29 ENCOUNTER — NON-APPOINTMENT (OUTPATIENT)
Age: 52
End: 2022-12-29

## 2023-01-03 ENCOUNTER — NON-APPOINTMENT (OUTPATIENT)
Age: 53
End: 2023-01-03

## 2023-01-03 ENCOUNTER — APPOINTMENT (OUTPATIENT)
Dept: CARDIOLOGY | Facility: CLINIC | Age: 53
End: 2023-01-03
Payer: COMMERCIAL

## 2023-01-03 PROCEDURE — 93294 REM INTERROG EVL PM/LDLS PM: CPT

## 2023-01-03 PROCEDURE — 93296 REM INTERROG EVL PM/IDS: CPT

## 2023-03-02 ENCOUNTER — APPOINTMENT (OUTPATIENT)
Dept: ELECTROPHYSIOLOGY | Facility: CLINIC | Age: 53
End: 2023-03-02

## 2023-03-10 ENCOUNTER — NON-APPOINTMENT (OUTPATIENT)
Age: 53
End: 2023-03-10

## 2023-04-16 ENCOUNTER — NON-APPOINTMENT (OUTPATIENT)
Age: 53
End: 2023-04-16

## 2023-04-20 ENCOUNTER — APPOINTMENT (OUTPATIENT)
Dept: OPHTHALMOLOGY | Facility: CLINIC | Age: 53
End: 2023-04-20
Payer: COMMERCIAL

## 2023-04-20 ENCOUNTER — OUTPATIENT (OUTPATIENT)
Dept: OUTPATIENT SERVICES | Facility: HOSPITAL | Age: 53
LOS: 1 days | End: 2023-04-20
Payer: COMMERCIAL

## 2023-04-20 DIAGNOSIS — H53.8 OTHER VISUAL DISTURBANCES: ICD-10-CM

## 2023-04-20 PROCEDURE — 99213 OFFICE O/P EST LOW 20 MIN: CPT

## 2023-04-26 ENCOUNTER — APPOINTMENT (OUTPATIENT)
Dept: OPHTHALMOLOGY | Facility: CLINIC | Age: 53
End: 2023-04-26

## 2023-04-26 DIAGNOSIS — H20.11 CHRONIC IRIDOCYCLITIS, RIGHT EYE: ICD-10-CM

## 2023-06-06 ENCOUNTER — NON-APPOINTMENT (OUTPATIENT)
Age: 53
End: 2023-06-06

## 2023-06-06 ENCOUNTER — APPOINTMENT (OUTPATIENT)
Dept: CARDIOLOGY | Facility: CLINIC | Age: 53
End: 2023-06-06
Payer: COMMERCIAL

## 2023-06-06 PROCEDURE — 93296 REM INTERROG EVL PM/IDS: CPT

## 2023-06-06 PROCEDURE — 93294 REM INTERROG EVL PM/LDLS PM: CPT

## 2023-09-08 ENCOUNTER — APPOINTMENT (OUTPATIENT)
Dept: CARDIOLOGY | Facility: CLINIC | Age: 53
End: 2023-09-08
Payer: COMMERCIAL

## 2023-09-08 ENCOUNTER — NON-APPOINTMENT (OUTPATIENT)
Age: 53
End: 2023-09-08

## 2023-09-09 PROCEDURE — 93294 REM INTERROG EVL PM/LDLS PM: CPT

## 2023-09-09 PROCEDURE — 93296 REM INTERROG EVL PM/IDS: CPT

## 2023-09-26 NOTE — PRE-ANESTHESIA EVALUATION ADULT - ANESTHESIA, PREVIOUS REACTION, PROFILE
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       Mobility    Independent      Isolation  None      Residence:   House      Additional Information:       ER Nurse: Valerie Hurtado RN     Phone: [phone]   none

## 2023-10-28 NOTE — BRIEF OPERATIVE NOTE - NSICDXBRIEFPOSTOP_GEN_ALL_CORE_FT
Pt had open heart surgery 10 days ago. Pt states since the surgery has had chills and fevers in the afternoon each day. Pt was placed on abx and sent home. Pt states that since he got home on Wednesday has been experiencing fevers and chills in the afternoon.  
POST-OP DIAGNOSIS:  Hallux abductovalgus, right 16-Aug-2019 19:57:25  Wilfrido Arango of right foot 16-Aug-2019 19:57:14  Wilfrido Arango

## 2023-12-08 ENCOUNTER — APPOINTMENT (OUTPATIENT)
Dept: CARDIOLOGY | Facility: CLINIC | Age: 53
End: 2023-12-08
Payer: COMMERCIAL

## 2023-12-08 ENCOUNTER — NON-APPOINTMENT (OUTPATIENT)
Age: 53
End: 2023-12-08

## 2023-12-08 PROCEDURE — 93294 REM INTERROG EVL PM/LDLS PM: CPT

## 2023-12-08 PROCEDURE — 93296 REM INTERROG EVL PM/IDS: CPT

## 2024-02-27 NOTE — H&P ADULT - REASON FOR ADMISSION
Physical Therapy Visit    Visit Type: Daily Treatment Note  Visit: 3  Referring Provider: Anh Sosa MD  Medical Diagnosis (from order): M47.896 - Other spondylosis, lumbar region     SUBJECTIVE                                                                                                               Patient notes that overall, he has been improving. He states that he is not having any glute pain as of late. He reports that he is trying to do activities without 2ww at home but does still need counter support.       OBJECTIVE                                                                                                                           Treatment     Therapeutic Exercise  NuStep level 3 seat 8 x5 minutes   March ambulation in parallel bars x4  Long stride ambulation in parallel bars x4  Retro stepping in parallel bars x4   Mini-squats in parallel bars x10  Standing 3 way hip with red band x10 bilateral   Supine hamstring stretch with strap x5 with 10 second hold bilateral     Skilled input: tactile instruction/cues and verbal instruction/cues    Writer verbally educated and received verbal consent for hand placement, positioning of patient, and techniques to be performed today from patient for hand placement and palpation for techniques, clothing adjustments for techniques and therapist position for techniques as described above and how they are pertinent to the patient's plan of care.  Home Exercise Program  Access Code: [email or web address]  URL: [email or web address]  Date: 02/27/2024  Prepared by: Robert Torres    Exercises  - Seated Hamstring Stretch  - 2 x daily - 7 x weekly - 1 sets - 5 reps - 15 hold  - Seated Piriformis Stretch  - 2 x daily - 7 x weekly - 1 sets - 5 reps - 15 hold  - Seated Flexion Stretch  - 2 x daily - 7 x weekly - 1 sets - 5 reps - 15 hold  - Standing Lumbar Extension at Wall - Forearms  - 2 x daily - 7 x weekly - 1 sets - 10 reps - 5 hold  - Hooklying  Clamshell with Resistance  - 2 x daily - 7 x weekly - 2 sets - 10-15 reps  - Supine Lower Trunk Rotation  - 2 x daily - 7 x weekly - 2 sets - 10-15 reps  - Beginner Bridge  - 2 x daily - 7 x weekly - 2 sets - 10-15 reps  - Supine Hamstring Stretch with Strap  - 2 x daily - 7 x weekly - 1 sets - 10 reps - 5 hold  - Standing 3-Way Leg Reach with Resistance at Ankles and Counter Support  - 2 x daily - 7 x weekly - 1 sets - 10 reps - 1 hold      ASSESSMENT                                                                                                            Patient  overall is seen to be doing well and progressing. He is noted to have improved mobility with ambulation. He was seen to have significant Lower Extremity tightness this date especially with supine hamstring stretching. He was noted to be easily fatigued with all standing and strengthening exercises as well. He did have increase in lumbar soreness secondary to fatigue. Patient was seen to have improved stride length post session.   Education:   - Results of above outlined education: Verbalizes understanding and Demonstrates understanding    PLAN                                                                                                                           Suggestions for next session as indicated: Progress per plan of care, Lower Extremity strengthening, standing balance, functional mobility       Therapy procedure time and total treatment time can be found documented on the Time Entry flowsheet     Neutropenic Fever

## 2024-02-28 NOTE — ASU PATIENT PROFILE, ADULT - NS PRO TALK SOMEONE YN
Patient called in because he needs refills on his ALPRAZolam (XANAX) 0.5 MG tablet and his gabapentin (NEURONTIN) 800 MG tablet. He is aware that [facility] said she would not refill the Xanax and he would have to establish with psych. Patient had an appointment but missed it because he had covid and then he made another appointment with psych but his car broke down. Patient said the psych appointment has been put on hold until he gets a vehicle so the patient has not seen psych yet. He was asking if [facility] could fill these prescriptions this time. PLEASE SEND SCRIPTS TO THE [facility] [address] [facility].  
Prescription sent to [facility] [address].    Separate message sent to  staff to notify patient Xanax and gabapentin prescriptions were refilled and to notify [facility] that I will not refill any additional Xanax refills.  
no

## 2024-03-08 ENCOUNTER — NON-APPOINTMENT (OUTPATIENT)
Age: 54
End: 2024-03-08

## 2024-03-08 ENCOUNTER — APPOINTMENT (OUTPATIENT)
Dept: CARDIOLOGY | Facility: CLINIC | Age: 54
End: 2024-03-08
Payer: MEDICAID

## 2024-03-08 PROCEDURE — 93296 REM INTERROG EVL PM/IDS: CPT

## 2024-03-08 PROCEDURE — 93294 REM INTERROG EVL PM/LDLS PM: CPT

## 2024-06-10 ENCOUNTER — APPOINTMENT (OUTPATIENT)
Dept: CARDIOLOGY | Facility: CLINIC | Age: 54
End: 2024-06-10
Payer: COMMERCIAL

## 2024-06-10 ENCOUNTER — NON-APPOINTMENT (OUTPATIENT)
Age: 54
End: 2024-06-10

## 2024-06-10 PROCEDURE — 93294 REM INTERROG EVL PM/LDLS PM: CPT

## 2024-06-10 PROCEDURE — 93296 REM INTERROG EVL PM/IDS: CPT

## 2024-06-19 NOTE — ASU PATIENT PROFILE, ADULT - NS SC CAGE ALCOHOL ANNOYED YOU
----- Message from Akila Hutson sent at 6/19/2024  9:16 AM CDT -----  Contact: self  Type:  Patient Returning Call    Who Called:  pt  Who Left Message for Patient:  kushal  Does the patient know what this is regarding?:  yes  Best Call Back Number:  [phone]   Additional Information:  please call  
LVM returning patient call  
no

## 2024-07-13 ENCOUNTER — NON-APPOINTMENT (OUTPATIENT)
Age: 54
End: 2024-07-13

## 2024-08-03 ENCOUNTER — NON-APPOINTMENT (OUTPATIENT)
Age: 54
End: 2024-08-03

## 2024-08-15 NOTE — ED ADULT TRIAGE NOTE - NS ED TRIAGE AVPU SCALE
OPERATIVE REPORT    Patient: Mireille So; YOB: 1959 (age: 64 year old)    MRN#: [id number]    Surgery Date: 8/15/2024    PREOPERATIVE DIAGNOSIS:  Visually significant cataract of the right eye.    POSTOPERATIVE DIAGNOSIS:  Visually significant cataract of the right eye.    PROCEDURE:  Phacoemulsification and cataract extraction with posterior chamber intraocular lens placement of the right eye. [id number]    SURGEON:  Kristian Frances MD    ANESTHESIOLOGIST:  [facility].    ANESTHESIA TYPE:  MAC IV  TOPICAL    ESTIMATED BLOOD LOSS:  None    LENS:  CCA0T0 +19.0 D, SN [id number]    SURGICAL DESCRIPTION:  The patient was properly identified in the preoperative holding area and the operating room where a time-out was taken to verify surgical site. The patient was given topical anesthesia drops in the operative eye as well as MAC IV sedation. The patient was then prepped and draped in the usual sterile fashion using 5% Betadine, which was also placed into the conjunctival sac. A Nirav lid speculum was inserted into the operative eye.     A paracentesis was made approximately 90 degrees to the left of the main temporal incision. Then 1% preservative free lidocaine with 1.5% phenylephrine was injected into the anterior chamber followed by dispersive viscoelastic. A single plane clear corneal temporal incision was made with a 2.6 mm keratome. A bent cystotome needle and Hewitt forceps were used to create a continuous curvilinear capsulorrhexis. Hydrodissection was completed with the Nichamin cannula, and the lens rotated easily. Additional dispersive viscoelastic was used to recoat the corneal endothelium. Phacoemulsification was preformed with a stop and chop technique to remove the nucleus and epinucleus. Single piece irrigation and aspiration was used to remove the remaining lens cortex. The capsular bag was then filled with cohesive viscoelastic. The lens was injected into the bag  and rotated into place with the Kuglan hook. The viscoelastic was removed from the eye using single piece irrigation and aspiration. The eye was filled to an appropriate pressure with balanced salt solution. The wounds were hydrated with balanced salt solution. 0.05 mL of triamcinolone-40 was placed in the superonasal subconjunctival space.    At the end of the case, the lens was well centered, the eye was filled to an appropriate pressure, and all wounds were watertight as checked with Weck-Nieves sponges. The Nirav lid speculum was removed from the operative eye.    Drops of moxifloxacin were placed on the ocular surface. The eye was shielded. The patient was transported to the recovery room in stable condition.    Kristian Frances MD     Alert-The patient is alert, awake and responds to voice. The patient is oriented to time, place, and person. The triage nurse is able to obtain subjective information.

## 2024-09-08 ENCOUNTER — NON-APPOINTMENT (OUTPATIENT)
Age: 54
End: 2024-09-08

## 2024-09-09 ENCOUNTER — APPOINTMENT (OUTPATIENT)
Dept: CARDIOLOGY | Facility: CLINIC | Age: 54
End: 2024-09-09
Payer: COMMERCIAL

## 2024-09-09 PROCEDURE — 93296 REM INTERROG EVL PM/IDS: CPT

## 2024-09-09 PROCEDURE — 93294 REM INTERROG EVL PM/LDLS PM: CPT

## 2024-09-11 NOTE — PATIENT PROFILE ADULT - NSPROGENOTHERPROVIDER_GEN_A_NUR
[Encouraged to increase physical activity] : Encouraged to increase physical activity [____ min/wk Activity] : [unfilled] min/wk activity none

## 2024-09-25 NOTE — ASU DISCHARGE PLAN (ADULT/PEDIATRIC) - SIGNS AND SYMPTOMS OF INFECTION: FEVER, REDNESS, SWELLING, FOUL SMELLING DISCHARGE
[Negative] : Heme/Lymph [Patient Intake Form Reviewed] : Patient intake form was reviewed [As Noted in HPI] : as noted in HPI Statement Selected

## 2024-10-23 ENCOUNTER — APPOINTMENT (OUTPATIENT)
Dept: ELECTROPHYSIOLOGY | Facility: CLINIC | Age: 54
End: 2024-10-23
Payer: MEDICAID

## 2024-10-23 VITALS
HEIGHT: 75 IN | HEART RATE: 71 BPM | BODY MASS INDEX: 27.35 KG/M2 | WEIGHT: 220 LBS | DIASTOLIC BLOOD PRESSURE: 90 MMHG | SYSTOLIC BLOOD PRESSURE: 140 MMHG

## 2024-10-23 PROCEDURE — 99215 OFFICE O/P EST HI 40 MIN: CPT

## 2024-10-23 PROCEDURE — G2211 COMPLEX E/M VISIT ADD ON: CPT | Mod: NC

## 2024-10-23 PROCEDURE — 93000 ELECTROCARDIOGRAM COMPLETE: CPT | Mod: 59

## 2024-10-23 PROCEDURE — 93280 PM DEVICE PROGR EVAL DUAL: CPT

## 2024-10-23 RX ORDER — METOPROLOL TARTRATE 50 MG/1
50 TABLET ORAL
Qty: 4 | Refills: 0 | Status: ACTIVE | COMMUNITY
Start: 2024-10-23 | End: 1900-01-01

## 2024-10-25 ENCOUNTER — NON-APPOINTMENT (OUTPATIENT)
Age: 54
End: 2024-10-25

## 2024-12-02 ENCOUNTER — NON-APPOINTMENT (OUTPATIENT)
Age: 54
End: 2024-12-02

## 2024-12-06 ENCOUNTER — APPOINTMENT (OUTPATIENT)
Dept: PULMONOLOGY | Facility: CLINIC | Age: 54
End: 2024-12-06

## 2025-01-16 ENCOUNTER — APPOINTMENT (OUTPATIENT)
Dept: PULMONOLOGY | Facility: CLINIC | Age: 55
End: 2025-01-16

## 2025-01-22 ENCOUNTER — APPOINTMENT (OUTPATIENT)
Dept: CARDIOLOGY | Facility: CLINIC | Age: 55
End: 2025-01-22
Payer: MEDICAID

## 2025-01-22 ENCOUNTER — NON-APPOINTMENT (OUTPATIENT)
Age: 55
End: 2025-01-22

## 2025-01-22 PROCEDURE — 93294 REM INTERROG EVL PM/LDLS PM: CPT

## 2025-01-22 PROCEDURE — 93296 REM INTERROG EVL PM/IDS: CPT

## 2025-02-14 ENCOUNTER — EMERGENCY (EMERGENCY)
Facility: HOSPITAL | Age: 55
LOS: 0 days | Discharge: HOME | End: 2025-02-14
Attending: STUDENT IN AN ORGANIZED HEALTH CARE EDUCATION/TRAINING PROGRAM
Payer: MEDICAID

## 2025-02-14 VITALS
HEART RATE: 72 BPM | HEIGHT: 75 IN | DIASTOLIC BLOOD PRESSURE: 79 MMHG | RESPIRATION RATE: 18 BRPM | TEMPERATURE: 97 F | OXYGEN SATURATION: 96 % | SYSTOLIC BLOOD PRESSURE: 138 MMHG | WEIGHT: 220.02 LBS

## 2025-02-14 DIAGNOSIS — R80.9 PROTEINURIA, UNSPECIFIED: ICD-10-CM

## 2025-02-14 DIAGNOSIS — Z95.0 PRESENCE OF CARDIAC PACEMAKER: ICD-10-CM

## 2025-02-14 DIAGNOSIS — N50.812 LEFT TESTICULAR PAIN: ICD-10-CM

## 2025-02-14 DIAGNOSIS — I86.1 SCROTAL VARICES: ICD-10-CM

## 2025-02-14 LAB
APPEARANCE UR: CLEAR — SIGNIFICANT CHANGE UP
BILIRUB UR-MCNC: ABNORMAL
COLOR SPEC: SIGNIFICANT CHANGE UP
DIFF PNL FLD: NEGATIVE — SIGNIFICANT CHANGE UP
GLUCOSE UR QL: NEGATIVE MG/DL — SIGNIFICANT CHANGE UP
KETONES UR-MCNC: ABNORMAL MG/DL
LEUKOCYTE ESTERASE UR-ACNC: NEGATIVE — SIGNIFICANT CHANGE UP
NITRITE UR-MCNC: NEGATIVE — SIGNIFICANT CHANGE UP
PH UR: 5 — SIGNIFICANT CHANGE UP (ref 5–8)
PROT UR-MCNC: 30 MG/DL
SP GR SPEC: >1.03 — HIGH (ref 1–1.03)
UROBILINOGEN FLD QL: 1 MG/DL — SIGNIFICANT CHANGE UP (ref 0.2–1)

## 2025-02-14 PROCEDURE — 99284 EMERGENCY DEPT VISIT MOD MDM: CPT | Mod: 25

## 2025-02-14 PROCEDURE — 76870 US EXAM SCROTUM: CPT | Mod: 26

## 2025-02-14 PROCEDURE — 81001 URINALYSIS AUTO W/SCOPE: CPT

## 2025-02-14 PROCEDURE — 76870 US EXAM SCROTUM: CPT

## 2025-02-14 PROCEDURE — 99284 EMERGENCY DEPT VISIT MOD MDM: CPT

## 2025-02-14 NOTE — ED PROVIDER NOTE - OBJECTIVE STATEMENT
The patient is a 52-year-old male with a history of inguinal hernia, who is here because a look testicular pain rating to the groin.  Morning.  Patient denies any penile tenderness, any discharge from the penis, any hematuria or dysuria.  He denies any suprapubic pain or abdominal pain.  He denies any nausea or vomiting or diarrhea denies any chest pain shortness of breath The patient is a 54-year-old male with a history of inguinal hernia, complete heart block s/p Medtronic dual-chamber permanent pacemaker and substance use disorder who is here because a look testicular pain rating to the left hip and down to his left leg starting this morning.  Patient took Tylenol and Motrin with mild improvement 4 hours pta. He denies any penile tenderness, any discharge from the penis, any hematuria or dysuria.  He denies any suprapubic pain or abdominal pain.  He denies any nausea or vomiting or diarrhea denies any chest pain, shortness of breath, constipation, obstipation, fevers, or chills.

## 2025-02-14 NOTE — ED PROVIDER NOTE - PATIENT PORTAL LINK FT
You can access the FollowMyHealth Patient Portal offered by [facility] by registering at the following website: [email or web address]. By joining [facility]’s FollowMyHealth portal, you will also be able to view your health information using other applications (apps) compatible with our system.

## 2025-02-14 NOTE — ED PROVIDER NOTE - CLINICAL SUMMARY MEDICAL DECISION MAKING FREE TEXT BOX
The MDM was documented by me personally, Dr. Filemon Chopra, supervising attending and serves as my attending contribution to the care of the patient. I have personally performed a history and physical exam on this patient and personally directed the management of the patient.     Pt here because of left testicular pain, no N/V.     Physical exam as above.     UA obtained and testicular ultrasound - testicular ultrasound with left paratesticular prominence - which could be related to varicocele. UA with no evidence of infection however, there is proteinuria and casts. Discussed these results to the pt. we had joint agreement that pt needs to follow up with nephrologist and urologist. he was instructed to take tylenol and ibuprofen for pain, and to use loose fitting clothing and underwear to prevent testicular compression.     Pt was stable for DC. Pt was given strict instructions and return precautions. pt was instructed to return to the ER if symptoms worsen at any time and to return to the ER at any time for any other medical concern. Pt was counseled on continuing home medications and new prescriptions.

## 2025-02-14 NOTE — ED ADULT NURSE NOTE - CAS EDN DISCHARGE ASSESSMENT
This was a shared visit with the MANUEL. I reviewed and verified the documentation and independently performed the documented: Alert and oriented to person, place and time

## 2025-02-14 NOTE — ED PROVIDER NOTE - NSPTACCESSSVCSAPPTDETAILS_ED_ALL_ED_FT
pls also put in nephrology follow up for proteinuria and casts in urine. urology follow up for follow up for varicocele

## 2025-02-14 NOTE — ED PROVIDER NOTE - PHYSICAL EXAMINATION
Vitals: HD stable, O2 stable  Gen: appropriate affect, no acute distress  Neuro: no focal defects, no sensory deficits  HEENT: EOMI, no JVD, normocephalic, atraumatic, no scleral icterus  Cardio: RRR, S1 S2 present, no murmurs, rubs, or gallops  Resp: lungs b/l CTA, chest wall intact  Abd: soft, nondistended, nontender  MSK: no gross joint abnormalities, no obvious swelling  Skin: no rashes, no wounds  heme: no ecchymosis or petechiae   : no obvious ecchymosis to the scrotum, no penile discharge or lesions Vitals: HD stable, O2 stable  Gen: appropriate affect, no acute distress  Neuro: no focal defects, no sensory deficits  HEENT: EOMI, no JVD, normocephalic, atraumatic, no scleral icterus  Cardio: RRR, S1 S2 present, no murmurs, rubs, or gallops  Resp: lungs b/l CTA, chest wall intact  Abd: soft, nondistended, nontender  MSK: no gross joint abnormalities, no obvious swelling  Skin: no rashes, no wounds  heme: no ecchymosis or petechiae   : no obvious ecchymosis to the scrotum, no penile discharge or lesions, no scrotal discoloration, normal testicular lie, cremasteric reflex equivocal, no testicular tenderness, chaperoned by Nurse Clifford

## 2025-02-14 NOTE — ED ADULT NURSE NOTE - OBJECTIVE STATEMENT
Pt presents to the ED complaining of testicular pain radiating to the groin and left leg starting this morning.

## 2025-02-14 NOTE — ED PROVIDER NOTE - NSFOLLOWUPINSTRUCTIONS_ED_ALL_ED_FT
Varicocele  The human body, showing a normal testicle and a testicle with a varicocele.   A varicocele is a swelling of veins in the scrotum. The scrotum is the sac that contains the testicles. Varicoceles can occur on either side of the scrotum, but they are more common on the left side. They occur most often in teenage boys and young men.    In most cases, varicoceles are not a serious problem. They are usually small and painless and do not require treatment. Tests may be done to confirm the diagnosis. Treatment may be needed if:  A varicocele is large, causes a lot of pain, or causes pain when exercising.  Varicoceles are found on both sides of the scrotum.  A varicocele causes a decrease in the size of the testicle in a growing adolescent.  A varicocele makes it hard to get someone pregnant (infertility).  What are the causes?  This condition is caused by valves in the veins not working properly. Valves in the veins help to return blood from the scrotum and testicles to the heart. If these valves do not work well, blood flows backward and backs up into the veins, which causes the veins to swell. This is similar to what happens when varicose veins form in the leg.    What are the signs or symptoms?  Most varicoceles do not cause any symptoms. If symptoms do occur, they may include:  Swelling on one side of the scrotum. The swelling may be more obvious when you are standing up.  A lumpy feeling in the scrotum.  A heavy feeling on one side of the scrotum.  A dull ache in the scrotum, especially after exercise or prolonged standing or sitting.  Slower growth or reduced size of the testicle on the side of the varicocele. This happens in young males.  Infertility. This can occur if the testicle does not grow normally or if the condition causes problems with the sperm, such as a low sperm count or sperm that are not able to reach the egg.  How is this diagnosed?  This condition is diagnosed based on:  Your medical history.  A physical exam. Your health care provider may check and feel the scrotum area to check for swollen or enlarged veins.  An ultrasound. This may be done to confirm the diagnosis and to help rule out other causes of the swelling.  How is this treated?  Treatment is usually not needed for this condition. If you have any pain, your provider may give you medicine to help relieve it. Your provider may need to do tests to make sure that your varicocele does not cause problems.    If further treatment is needed, you may have one of these procedures:  Varicocelectomy. This is surgery to tie off the swollen veins so that the flow of blood goes to other veins instead.  Embolization. This procedure uses a soft tube (catheter) to place metal coils or other devices to block the veins. This cuts off the blood flow to the swollen veins.  Follow these instructions at home:  Take over-the-counter and prescription medicines only as told by your provider.  Wear supportive underwear.  Use an athletic supporter when doing sports activities.  Contact a health care provider if:  Your pain is increasing.  You have redness in the affected area.  Your testicle is enlarged, swollen, or painful.  You have swelling that does not get better when you are lying down.  One of your testicles is smaller than the other.  You develop swelling in your legs.  Get help right away if:  You have difficulty breathing.  This symptom may be an emergency. Get help right away. Call 911.  Do not wait to see if the symptom will go away.  Do not drive yourself to the hospital.  This information is not intended to replace advice given to you by your health care provider. Make sure you discuss any questions you have with your health care provider.

## 2025-02-15 ENCOUNTER — EMERGENCY (EMERGENCY)
Facility: HOSPITAL | Age: 55
LOS: 0 days | Discharge: HOME | End: 2025-02-15
Attending: EMERGENCY MEDICINE
Payer: MEDICAID

## 2025-02-15 VITALS
HEART RATE: 73 BPM | HEIGHT: 75 IN | RESPIRATION RATE: 18 BRPM | TEMPERATURE: 98 F | SYSTOLIC BLOOD PRESSURE: 139 MMHG | DIASTOLIC BLOOD PRESSURE: 92 MMHG | OXYGEN SATURATION: 100 % | WEIGHT: 220.02 LBS

## 2025-02-15 VITALS
DIASTOLIC BLOOD PRESSURE: 81 MMHG | SYSTOLIC BLOOD PRESSURE: 146 MMHG | OXYGEN SATURATION: 97 % | RESPIRATION RATE: 16 BRPM | HEART RATE: 74 BPM

## 2025-02-15 DIAGNOSIS — M25.552 PAIN IN LEFT HIP: ICD-10-CM

## 2025-02-15 DIAGNOSIS — N50.812 LEFT TESTICULAR PAIN: ICD-10-CM

## 2025-02-15 DIAGNOSIS — Z98.890 OTHER SPECIFIED POSTPROCEDURAL STATES: ICD-10-CM

## 2025-02-15 DIAGNOSIS — M54.9 DORSALGIA, UNSPECIFIED: ICD-10-CM

## 2025-02-15 DIAGNOSIS — Z95.0 PRESENCE OF CARDIAC PACEMAKER: ICD-10-CM

## 2025-02-15 LAB
ALBUMIN SERPL ELPH-MCNC: 4.2 G/DL — SIGNIFICANT CHANGE UP (ref 3.5–5.2)
ALP SERPL-CCNC: 78 U/L — SIGNIFICANT CHANGE UP (ref 30–115)
ALT FLD-CCNC: 26 U/L — SIGNIFICANT CHANGE UP (ref 0–41)
ANION GAP SERPL CALC-SCNC: 8 MMOL/L — SIGNIFICANT CHANGE UP (ref 7–14)
APPEARANCE UR: CLEAR — SIGNIFICANT CHANGE UP
AST SERPL-CCNC: 22 U/L — SIGNIFICANT CHANGE UP (ref 0–41)
BASOPHILS # BLD AUTO: 0.06 K/UL — SIGNIFICANT CHANGE UP (ref 0–0.2)
BASOPHILS NFR BLD AUTO: 0.7 % — SIGNIFICANT CHANGE UP (ref 0–1)
BILIRUB SERPL-MCNC: 0.3 MG/DL — SIGNIFICANT CHANGE UP (ref 0.2–1.2)
BILIRUB UR-MCNC: NEGATIVE — SIGNIFICANT CHANGE UP
BUN SERPL-MCNC: 16 MG/DL — SIGNIFICANT CHANGE UP (ref 10–20)
CALCIUM SERPL-MCNC: 9.2 MG/DL — SIGNIFICANT CHANGE UP (ref 8.4–10.5)
CHLORIDE SERPL-SCNC: 104 MMOL/L — SIGNIFICANT CHANGE UP (ref 98–110)
CO2 SERPL-SCNC: 26 MMOL/L — SIGNIFICANT CHANGE UP (ref 17–32)
COLOR SPEC: SIGNIFICANT CHANGE UP
CREAT SERPL-MCNC: 1 MG/DL — SIGNIFICANT CHANGE UP (ref 0.7–1.5)
DIFF PNL FLD: NEGATIVE — SIGNIFICANT CHANGE UP
EGFR: 91 ML/MIN/1.73M2 — SIGNIFICANT CHANGE UP
EGFR: 91 ML/MIN/1.73M2 — SIGNIFICANT CHANGE UP
EOSINOPHIL # BLD AUTO: 0.22 K/UL — SIGNIFICANT CHANGE UP (ref 0–0.7)
EOSINOPHIL NFR BLD AUTO: 2.6 % — SIGNIFICANT CHANGE UP (ref 0–8)
GLUCOSE SERPL-MCNC: 105 MG/DL — HIGH (ref 70–99)
GLUCOSE UR QL: NEGATIVE MG/DL — SIGNIFICANT CHANGE UP
HCT VFR BLD CALC: 44.6 % — SIGNIFICANT CHANGE UP (ref 42–52)
HGB BLD-MCNC: 15.3 G/DL — SIGNIFICANT CHANGE UP (ref 14–18)
IMM GRANULOCYTES NFR BLD AUTO: 0.6 % — HIGH (ref 0.1–0.3)
KETONES UR-MCNC: NEGATIVE MG/DL — SIGNIFICANT CHANGE UP
LEUKOCYTE ESTERASE UR-ACNC: NEGATIVE — SIGNIFICANT CHANGE UP
LIDOCAIN IGE QN: 12 U/L — SIGNIFICANT CHANGE UP (ref 7–60)
LYMPHOCYTES # BLD AUTO: 2.01 K/UL — SIGNIFICANT CHANGE UP (ref 1.2–3.4)
LYMPHOCYTES # BLD AUTO: 23.6 % — SIGNIFICANT CHANGE UP (ref 20.5–51.1)
MCHC RBC-ENTMCNC: 29.6 PG — SIGNIFICANT CHANGE UP (ref 27–31)
MCHC RBC-ENTMCNC: 34.3 G/DL — SIGNIFICANT CHANGE UP (ref 32–37)
MCV RBC AUTO: 86.3 FL — SIGNIFICANT CHANGE UP (ref 80–94)
MONOCYTES # BLD AUTO: 0.86 K/UL — HIGH (ref 0.1–0.6)
MONOCYTES NFR BLD AUTO: 10.1 % — HIGH (ref 1.7–9.3)
NEUTROPHILS # BLD AUTO: 5.33 K/UL — SIGNIFICANT CHANGE UP (ref 1.4–6.5)
NEUTROPHILS NFR BLD AUTO: 62.4 % — SIGNIFICANT CHANGE UP (ref 42.2–75.2)
NITRITE UR-MCNC: NEGATIVE — SIGNIFICANT CHANGE UP
NRBC BLD AUTO-RTO: 0 /100 WBCS — SIGNIFICANT CHANGE UP (ref 0–0)
PH UR: 5.5 — SIGNIFICANT CHANGE UP (ref 5–8)
PLATELET # BLD AUTO: 181 K/UL — SIGNIFICANT CHANGE UP (ref 130–400)
PMV BLD: 9.4 FL — SIGNIFICANT CHANGE UP (ref 7.4–10.4)
POTASSIUM SERPL-MCNC: 4.3 MMOL/L — SIGNIFICANT CHANGE UP (ref 3.5–5)
POTASSIUM SERPL-SCNC: 4.3 MMOL/L — SIGNIFICANT CHANGE UP (ref 3.5–5)
PROT SERPL-MCNC: 6.7 G/DL — SIGNIFICANT CHANGE UP (ref 6–8)
PROT UR-MCNC: NEGATIVE MG/DL — SIGNIFICANT CHANGE UP
RBC # BLD: 5.17 M/UL — SIGNIFICANT CHANGE UP (ref 4.7–6.1)
RBC # FLD: 12.9 % — SIGNIFICANT CHANGE UP (ref 11.5–14.5)
SODIUM SERPL-SCNC: 138 MMOL/L — SIGNIFICANT CHANGE UP (ref 135–146)
SP GR SPEC: 1.03 — SIGNIFICANT CHANGE UP (ref 1–1.03)
UROBILINOGEN FLD QL: 1 MG/DL — SIGNIFICANT CHANGE UP (ref 0.2–1)
WBC # BLD: 8.53 K/UL — SIGNIFICANT CHANGE UP (ref 4.8–10.8)
WBC # FLD AUTO: 8.53 K/UL — SIGNIFICANT CHANGE UP (ref 4.8–10.8)

## 2025-02-15 PROCEDURE — 81003 URINALYSIS AUTO W/O SCOPE: CPT

## 2025-02-15 PROCEDURE — 99285 EMERGENCY DEPT VISIT HI MDM: CPT

## 2025-02-15 PROCEDURE — 96374 THER/PROPH/DIAG INJ IV PUSH: CPT | Mod: XU

## 2025-02-15 PROCEDURE — 74177 CT ABD & PELVIS W/CONTRAST: CPT | Mod: MC

## 2025-02-15 PROCEDURE — 36415 COLL VENOUS BLD VENIPUNCTURE: CPT

## 2025-02-15 PROCEDURE — 74177 CT ABD & PELVIS W/CONTRAST: CPT | Mod: 26

## 2025-02-15 PROCEDURE — 96376 TX/PRO/DX INJ SAME DRUG ADON: CPT

## 2025-02-15 PROCEDURE — 99284 EMERGENCY DEPT VISIT MOD MDM: CPT | Mod: 25

## 2025-02-15 PROCEDURE — 80053 COMPREHEN METABOLIC PANEL: CPT

## 2025-02-15 PROCEDURE — 83690 ASSAY OF LIPASE: CPT

## 2025-02-15 PROCEDURE — 96375 TX/PRO/DX INJ NEW DRUG ADDON: CPT

## 2025-02-15 PROCEDURE — 85025 COMPLETE CBC W/AUTO DIFF WBC: CPT

## 2025-02-15 RX ORDER — IBUPROFEN 200 MG
1 TABLET ORAL
Qty: 9 | Refills: 0
Start: 2025-02-15 | End: 2025-02-17

## 2025-02-15 RX ORDER — KETOROLAC TROMETHAMINE 30 MG/ML
15 INJECTION, SOLUTION INTRAMUSCULAR; INTRAVENOUS ONCE
Refills: 0 | Status: DISCONTINUED | OUTPATIENT
Start: 2025-02-15 | End: 2025-02-15

## 2025-02-15 RX ORDER — LIDOCAINE HYDROCHLORIDE 20 MG/ML
1 JELLY TOPICAL ONCE
Refills: 0 | Status: COMPLETED | OUTPATIENT
Start: 2025-02-15 | End: 2025-02-15

## 2025-02-15 RX ORDER — METHOCARBAMOL 500 MG/1
2 TABLET, FILM COATED ORAL
Qty: 18 | Refills: 0
Start: 2025-02-15 | End: 2025-02-17

## 2025-02-15 RX ORDER — METHOCARBAMOL 500 MG/1
1000 TABLET, FILM COATED ORAL ONCE
Refills: 0 | Status: COMPLETED | OUTPATIENT
Start: 2025-02-15 | End: 2025-02-15

## 2025-02-15 RX ORDER — DEXAMETHASONE 0.5 MG/1
10 TABLET ORAL ONCE
Refills: 0 | Status: COMPLETED | OUTPATIENT
Start: 2025-02-15 | End: 2025-02-15

## 2025-02-15 RX ADMIN — METHOCARBAMOL 1000 MILLIGRAM(S): 500 TABLET, FILM COATED ORAL at 07:52

## 2025-02-15 RX ADMIN — Medication 1000 MILLILITER(S): at 05:16

## 2025-02-15 RX ADMIN — Medication 4 MILLIGRAM(S): at 05:16

## 2025-02-15 RX ADMIN — KETOROLAC TROMETHAMINE 15 MILLIGRAM(S): 30 INJECTION, SOLUTION INTRAMUSCULAR; INTRAVENOUS at 05:59

## 2025-02-15 RX ADMIN — KETOROLAC TROMETHAMINE 15 MILLIGRAM(S): 30 INJECTION, SOLUTION INTRAMUSCULAR; INTRAVENOUS at 07:52

## 2025-02-15 RX ADMIN — KETOROLAC TROMETHAMINE 15 MILLIGRAM(S): 30 INJECTION, SOLUTION INTRAMUSCULAR; INTRAVENOUS at 07:11

## 2025-02-15 RX ADMIN — LIDOCAINE HYDROCHLORIDE 1 PATCH: 20 JELLY TOPICAL at 07:52

## 2025-02-15 RX ADMIN — DEXAMETHASONE 10 MILLIGRAM(S): 0.5 TABLET ORAL at 07:52

## 2025-03-13 ENCOUNTER — APPOINTMENT (OUTPATIENT)
Dept: UROLOGY | Facility: CLINIC | Age: 55
End: 2025-03-13

## 2025-03-26 ENCOUNTER — APPOINTMENT (OUTPATIENT)
Dept: CARDIOLOGY | Facility: CLINIC | Age: 55
End: 2025-03-26

## 2025-05-07 ENCOUNTER — APPOINTMENT (OUTPATIENT)
Dept: ELECTROPHYSIOLOGY | Facility: CLINIC | Age: 55
End: 2025-05-07

## 2025-06-20 ENCOUNTER — APPOINTMENT (OUTPATIENT)
Dept: CARDIOLOGY | Facility: CLINIC | Age: 55
End: 2025-06-20

## 2025-06-20 PROCEDURE — 93294 REM INTERROG EVL PM/LDLS PM: CPT

## 2025-06-20 PROCEDURE — 93296 REM INTERROG EVL PM/IDS: CPT

## 2025-09-19 ENCOUNTER — APPOINTMENT (OUTPATIENT)
Dept: CARDIOLOGY | Facility: CLINIC | Age: 55
End: 2025-09-19
Payer: MEDICAID

## 2025-09-19 ENCOUNTER — NON-APPOINTMENT (OUTPATIENT)
Age: 55
End: 2025-09-19

## 2025-09-19 PROCEDURE — 93294 REM INTERROG EVL PM/LDLS PM: CPT

## 2025-09-19 PROCEDURE — 93296 REM INTERROG EVL PM/IDS: CPT
